# Patient Record
Sex: MALE | Race: WHITE | Employment: OTHER | URBAN - METROPOLITAN AREA
[De-identification: names, ages, dates, MRNs, and addresses within clinical notes are randomized per-mention and may not be internally consistent; named-entity substitution may affect disease eponyms.]

---

## 2017-10-17 ENCOUNTER — HOSPITAL ENCOUNTER (OUTPATIENT)
Dept: NON INVASIVE DIAGNOSTICS | Age: 32
Discharge: HOME | End: 2017-10-17
Attending: FAMILY MEDICINE | Admitting: FAMILY MEDICINE

## 2017-10-17 ENCOUNTER — OFFICE VISIT (OUTPATIENT)
Dept: FAMILY MEDICINE CLINIC | Age: 32
End: 2017-10-17

## 2017-10-17 VITALS
WEIGHT: 247.2 LBS | HEART RATE: 84 BPM | BODY MASS INDEX: 31.73 KG/M2 | SYSTOLIC BLOOD PRESSURE: 146 MMHG | HEIGHT: 74 IN | DIASTOLIC BLOOD PRESSURE: 94 MMHG | TEMPERATURE: 98.1 F

## 2017-10-17 DIAGNOSIS — R07.9 CHEST PAIN, UNSPECIFIED TYPE: Primary | ICD-10-CM

## 2017-10-17 DIAGNOSIS — R06.09 DOE (DYSPNEA ON EXERTION): ICD-10-CM

## 2017-10-17 DIAGNOSIS — R63.1 INCREASED THIRST: ICD-10-CM

## 2017-10-17 DIAGNOSIS — R10.11 RIGHT UPPER QUADRANT ABDOMINAL PAIN: ICD-10-CM

## 2017-10-17 DIAGNOSIS — R35.0 INCREASED FREQUENCY OF URINATION: ICD-10-CM

## 2017-10-17 DIAGNOSIS — R07.9 CHEST PAIN, UNSPECIFIED TYPE: ICD-10-CM

## 2017-10-17 PROCEDURE — 99203 OFFICE O/P NEW LOW 30 MIN: CPT | Performed by: FAMILY MEDICINE

## 2017-10-17 PROCEDURE — 93000 ELECTROCARDIOGRAM COMPLETE: CPT | Performed by: FAMILY MEDICINE

## 2017-10-17 RX ORDER — IBUPROFEN 200 MG
200 TABLET ORAL EVERY 6 HOURS PRN
COMMUNITY
End: 2017-10-25 | Stop reason: ALTCHOICE

## 2017-10-17 NOTE — PATIENT INSTRUCTIONS
a better chance of having a healthy baby. You will decrease the health risks of nonsmokers if you stop smoking. By stopping smoking you will also save money. What is the best way to stop smoking? A large percentage of people have tried to quit smoking at least once. Most people who try to quit smoking go through a series of stages. Following are the stages you may go through to stop smoking: Thinking about quitting. Deciding to quit on a certain day. Quitting smoking. Successfully staying an ex-smoker. You must be strong in order to quit smoking. When you decide to quit, you can get help from your caregiver or others. You will learn that there are many ways to stop smoking. Talk to your caregiver about the best method for you when you are ready to quit smoking. Ask your caregiver for more information about how to stop smoking. Call or write the following for more information about the risks of smoking. Smokefree. gov  Phone: 7-244.323.3328  Web Address: www.smokefree. gov  [employer]  [address] [place] [address],[address]. [address]  Phone: 3-8-678.397.4385  Phone: [phone]  Web Address: [email or web address]. [address] [place] [address]  Phone: 7-514.384.6593  Web Address: http://[employer]. gov   CARE AGREEMENT:   You have the right to help plan your care. To help with this plan, you must learn about your health condition and how it may be treated. You can then discuss treatment options with your caregivers. Work with them to decide what care may be used to treat you. You always have the right to refuse treatment. Copyright © 2009. [employer] All rights reserved. Information is for End User's use only and may not be sold, redistributed or otherwise used for commercial purposes. The above information is an  only. It is not intended as medical advice for individual conditions or treatments.  Talk to your doctor, nurse or pharmacist before following any medical regimen to see if it is safe and effective for you.

## 2017-10-17 NOTE — PROGRESS NOTES
exhibits no distension. There is tenderness (ruq ttp). There is no rebound and no guarding. Musculoskeletal: Normal range of motion. Lymphadenopathy:     He has no cervical adenopathy. Neurological: He is alert and oriented to person, place, and time. Skin: Skin is warm and dry. Psychiatric: He has a normal mood and affect.  His behavior is normal. Judgment and thought content normal.       Assessment:      CP- EKG NSR  WHELAN- cxr  RUQ pain- check gallbladder vs liver        Plan:      Orders Placed This Encounter   Procedures    XR CHEST STANDARD (2 VW)     Standing Status:   Future     Number of Occurrences:   1     Standing Expiration Date:   10/17/2018     Order Specific Question:   Reason for exam:     Answer:   chest pain    US ABDOMEN LIMITED     PLEASE IMAGE GALLBLADDER AND LIVER     Standing Status:   Future     Standing Expiration Date:   10/17/2018     Order Specific Question:   Reason for exam:     Answer:   ruq PAIN    TSH with Reflex     Standing Status:   Future     Standing Expiration Date:   10/17/2018    LIPID PANEL     Standing Status:   Future     Standing Expiration Date:   10/17/2018     Order Specific Question:   Is Patient Fasting?/# of Hours     Answer:   12    CBC WITH AUTO DIFFERENTIAL     Standing Status:   Future     Standing Expiration Date:   10/17/2018    COMPREHENSIVE METABOLIC PANEL     Standing Status:   Future     Standing Expiration Date:   10/17/2018    URINALYSIS     Standing Status:   Future     Standing Expiration Date:   10/17/2018    AMYLASE     Standing Status:   Future     Standing Expiration Date:   10/17/2018    LIPASE     Standing Status:   Future     Standing Expiration Date:   10/17/2018    EKG 12 Lead     Order Specific Question:   Reason for Exam?     Answer:   Chest pain     RTO 2 weeks

## 2017-10-18 ASSESSMENT — ENCOUNTER SYMPTOMS
ABDOMINAL PAIN: 1
EYE PAIN: 0
WHEEZING: 0
CONSTIPATION: 0
SINUS PRESSURE: 0
BLOOD IN STOOL: 0
COUGH: 0
RHINORRHEA: 0
SHORTNESS OF BREATH: 1
DIARRHEA: 0
VOMITING: 0
EYE DISCHARGE: 0
CHEST TIGHTNESS: 0

## 2017-10-19 DIAGNOSIS — R07.9 CHEST PAIN, UNSPECIFIED TYPE: ICD-10-CM

## 2017-10-19 DIAGNOSIS — R63.1 INCREASED THIRST: ICD-10-CM

## 2017-10-19 DIAGNOSIS — R73.9 HYPERGLYCEMIA: Primary | ICD-10-CM

## 2017-10-19 DIAGNOSIS — R35.0 INCREASED FREQUENCY OF URINATION: ICD-10-CM

## 2017-10-19 DIAGNOSIS — R73.9 HYPERGLYCEMIA: ICD-10-CM

## 2017-10-19 DIAGNOSIS — R10.11 RIGHT UPPER QUADRANT ABDOMINAL PAIN: ICD-10-CM

## 2017-10-19 LAB
A/G RATIO: 1.4 (ref 1.1–2.2)
ALBUMIN SERPL-MCNC: 4.3 G/DL (ref 3.4–5)
ALP BLD-CCNC: 76 U/L (ref 40–129)
ALT SERPL-CCNC: 63 U/L (ref 10–40)
AMYLASE: 41 U/L (ref 25–115)
ANION GAP SERPL CALCULATED.3IONS-SCNC: 14 MMOL/L (ref 3–16)
AST SERPL-CCNC: 32 U/L (ref 15–37)
BASOPHILS ABSOLUTE: 0.1 K/UL (ref 0–0.2)
BASOPHILS RELATIVE PERCENT: 0.5 %
BILIRUB SERPL-MCNC: 0.5 MG/DL (ref 0–1)
BILIRUBIN URINE: NEGATIVE
BLOOD, URINE: NEGATIVE
BUN BLDV-MCNC: 20 MG/DL (ref 7–20)
CALCIUM SERPL-MCNC: 9.8 MG/DL (ref 8.3–10.6)
CHLORIDE BLD-SCNC: 98 MMOL/L (ref 99–110)
CHOLESTEROL, TOTAL: 269 MG/DL (ref 0–199)
CLARITY: CLEAR
CO2: 27 MMOL/L (ref 21–32)
COLOR: YELLOW
CREAT SERPL-MCNC: 1 MG/DL (ref 0.9–1.3)
EOSINOPHILS ABSOLUTE: 0.2 K/UL (ref 0–0.6)
EOSINOPHILS RELATIVE PERCENT: 2.2 %
GFR AFRICAN AMERICAN: >60
GFR NON-AFRICAN AMERICAN: >60
GLOBULIN: 3 G/DL
GLUCOSE BLD-MCNC: 130 MG/DL (ref 70–99)
GLUCOSE URINE: NEGATIVE MG/DL
HCT VFR BLD CALC: 46.3 % (ref 40.5–52.5)
HDLC SERPL-MCNC: 54 MG/DL (ref 40–60)
HEMOGLOBIN: 16 G/DL (ref 13.5–17.5)
KETONES, URINE: NEGATIVE MG/DL
LDL CHOLESTEROL CALCULATED: 163 MG/DL
LEUKOCYTE ESTERASE, URINE: NEGATIVE
LIPASE: 19 U/L (ref 13–60)
LYMPHOCYTES ABSOLUTE: 2.5 K/UL (ref 1–5.1)
LYMPHOCYTES RELATIVE PERCENT: 22.8 %
MCH RBC QN AUTO: 31.9 PG (ref 26–34)
MCHC RBC AUTO-ENTMCNC: 34.5 G/DL (ref 31–36)
MCV RBC AUTO: 92.4 FL (ref 80–100)
MICROSCOPIC EXAMINATION: NORMAL
MONOCYTES ABSOLUTE: 0.9 K/UL (ref 0–1.3)
MONOCYTES RELATIVE PERCENT: 8.4 %
NEUTROPHILS ABSOLUTE: 7.2 K/UL (ref 1.7–7.7)
NEUTROPHILS RELATIVE PERCENT: 66.1 %
NITRITE, URINE: NEGATIVE
PDW BLD-RTO: 12.4 % (ref 12.4–15.4)
PH UA: 6
PLATELET # BLD: 253 K/UL (ref 135–450)
PMV BLD AUTO: 8.9 FL (ref 5–10.5)
POTASSIUM SERPL-SCNC: 4.5 MMOL/L (ref 3.5–5.1)
PROTEIN UA: NEGATIVE MG/DL
RBC # BLD: 5.01 M/UL (ref 4.2–5.9)
SODIUM BLD-SCNC: 139 MMOL/L (ref 136–145)
SPECIFIC GRAVITY UA: 1.02
TOTAL PROTEIN: 7.3 G/DL (ref 6.4–8.2)
TRIGL SERPL-MCNC: 259 MG/DL (ref 0–150)
TSH REFLEX: 3.65 UIU/ML (ref 0.27–4.2)
URINE TYPE: NORMAL
UROBILINOGEN, URINE: 0.2 E.U./DL
VLDLC SERPL CALC-MCNC: 52 MG/DL
WBC # BLD: 10.9 K/UL (ref 4–11)

## 2017-10-20 LAB
ESTIMATED AVERAGE GLUCOSE: 111.2 MG/DL
HBA1C MFR BLD: 5.5 %

## 2017-10-24 ENCOUNTER — HOSPITAL ENCOUNTER (OUTPATIENT)
Dept: ULTRASOUND IMAGING | Age: 32
Discharge: HOME | End: 2017-10-24
Attending: FAMILY MEDICINE | Admitting: FAMILY MEDICINE

## 2017-10-24 DIAGNOSIS — R10.11 RIGHT UPPER QUADRANT PAIN: ICD-10-CM

## 2017-10-24 DIAGNOSIS — R10.11 RIGHT UPPER QUADRANT ABDOMINAL PAIN: ICD-10-CM

## 2017-10-25 ENCOUNTER — OFFICE VISIT (OUTPATIENT)
Dept: FAMILY MEDICINE CLINIC | Age: 32
End: 2017-10-25

## 2017-10-25 VITALS
TEMPERATURE: 98.6 F | HEIGHT: 74 IN | BODY MASS INDEX: 31.16 KG/M2 | WEIGHT: 242.8 LBS | DIASTOLIC BLOOD PRESSURE: 90 MMHG | HEART RATE: 88 BPM | SYSTOLIC BLOOD PRESSURE: 144 MMHG

## 2017-10-25 DIAGNOSIS — E78.2 MIXED HYPERLIPIDEMIA: ICD-10-CM

## 2017-10-25 DIAGNOSIS — K76.0 FATTY LIVER: ICD-10-CM

## 2017-10-25 DIAGNOSIS — R10.11 RIGHT UPPER QUADRANT ABDOMINAL PAIN: Primary | ICD-10-CM

## 2017-10-25 PROCEDURE — 99213 OFFICE O/P EST LOW 20 MIN: CPT | Performed by: FAMILY MEDICINE

## 2017-10-25 RX ORDER — OMEPRAZOLE 20 MG/1
20 CAPSULE, DELAYED RELEASE ORAL DAILY
Qty: 30 CAPSULE | Refills: 3 | Status: SHIPPED | OUTPATIENT
Start: 2017-10-25 | End: 2018-07-12

## 2017-10-25 NOTE — PATIENT INSTRUCTIONS
Hardening of the arteries happens more often in smokers than in nonsmokers. This may make it more likely for you to have a stroke (blood clot in your brain). The more cigarettes you smoke, the greater your risk of a heart attack. Lung disease: The younger you are when you start smoking, the greater your risk of getting lung diseases. Many smokers have a cough which is caused by the chemicals in smoke. These chemicals harm the cilia (tiny hairs) that line the lungs and help remove dirt and waste products. Depending upon how much you smoke, your lungs become gray and \"dirty\" (they look like charcoal). Healthy lungs are pink. Chronic bronchitis is a serious lung infection which is often caused by smoking. Emphysema is a long-term lung disease that may be caused by smoking cigarettes. Cigarette smoking also makes asthma worse. You are at a higher risk of getting colds, pneumonia, and other lung infections if you smoke. Gastrointestinal disease: Cigarette smoking increases the amount of acid that is made by your stomach, and may cause a peptic ulcer. A peptic ulcer is an open sore in the stomach or duodenum (part of the intestine). You may also get gastroesophageal reflux from smoking. This is when you have a backflow of stomach acid into your esophagus (food tube). Other problems: The following are other problems that smoking may cause: Bad breath. Bad smell in your clothes, hair, and skin. Decreased ability to play sports or do physical activities because of breathing problems. Earlier than normal wrinkling of the skin, usually the face. Higher risk of bone fractures, such as hip, wrist, or spine. Higher risk of starting a fire. This may happen if you fall asleep with a lit cigarette. Men may have problems having an erection. Sleeping problems. Smoking is an expensive (costly) habit. You will save money if you choose to stop smoking. Sore throat. Staining of teeth. Women and smoking: You may have a higher risk of having a heart attack or stroke if you smoke and use birth control pills. This risk is more serious if you are 35 years or older. The risk of losing your unborn baby or having a stillborn baby is higher if you are pregnant and smoke. Babies born to smoking mothers often weigh less, and are at a higher risk of sudden infant death syndrome (SIDS). You may have a harder time getting pregnant if you are a smoker. Women who smoke may have a higher risk of osteoporosis (also known as \"brittle bones\"). Women who smoke also have a higher risk of incontinence, which is when you are unable to control when you urinate. Are there risks with smoking cigars or pipes? The risks are the same for people who smoke cigars or pipes as they are for cigarette smokers. There is a risk of getting cancer of the mouth, lip, larynx (voice box), or esophagus if you smoke a cigar or pipe. What are the risks of using snuff or chewing tobacco (\"smokeless tobacco\")? People who use snuff or chewing tobacco have an increased risk of getting mouth or throat cancer. The risk of heart disease, stroke, blood vessel disease and stomach problems is the same as it is for cigarette smokers. What is \"passive smoking\"? Tobacco smoke is dangerous to others. The effect that smoking has on nonsmokers is called \"passive smoking\". Nonsmokers who breathe tobacco smoke have the same health risks as smokers. Children who are around tobacco smoke may have more colds, ear infections, or other breathing problems. Why should I quit smoking? The benefits from quitting smoking happen right away. Your sense of taste and smell will improve. Your body, clothes, car, and home will not smell of tobacco smoke. Your chance of getting cancer will be reduced as compared to a person who does not quit. As a former smoker, you will live longer than people who continue to smoke.  Women who quit smoking before getting pregnant have a better chance of having a healthy baby. You will decrease the health risks of nonsmokers if you stop smoking. By stopping smoking you will also save money. What is the best way to stop smoking? A large percentage of people have tried to quit smoking at least once. Most people who try to quit smoking go through a series of stages. Following are the stages you may go through to stop smoking: Thinking about quitting. Deciding to quit on a certain day. Quitting smoking. Successfully staying an ex-smoker. You must be strong in order to quit smoking. When you decide to quit, you can get help from your caregiver or others. You will learn that there are many ways to stop smoking. Talk to your caregiver about the best method for you when you are ready to quit smoking. Ask your caregiver for more information about how to stop smoking. Call or write the following for more information about the risks of smoking. Smokefree. gov  Phone: 1-648.171.7885  Web Address: www.smokefree. gov  American [employer] Association  [address] [place] [address],[address]. [address]  Phone: 8-1-657.175.3357  Phone: 6-5-638--[phone]  Web Address: [email or web address]. [address] [place] [address]  Phone: 0-815.377.5907  Web Address: http://[employer]/. gov   CARE AGREEMENT:   You have the right to help plan your care. To help with this plan, you must learn about your health condition and how it may be treated. You can then discuss treatment options with your caregivers. Work with them to decide what care may be used to treat you. You always have the right to refuse treatment. Copyright © 2009. [employer] All rights reserved. Information is for End User's use only and may not be sold, redistributed or otherwise used for commercial purposes. The above information is an  only. It is not intended as medical advice for individual conditions or treatments.  Talk to your doctor, nurse or pharmacist before following any medical regimen to see if it is safe and effective for you.

## 2017-10-29 PROBLEM — E78.2 MIXED HYPERLIPIDEMIA: Status: ACTIVE | Noted: 2017-10-29

## 2017-10-29 PROBLEM — R10.11 RIGHT UPPER QUADRANT ABDOMINAL PAIN: Status: ACTIVE | Noted: 2017-10-29

## 2017-10-29 PROBLEM — K76.0 FATTY LIVER: Status: ACTIVE | Noted: 2017-10-29

## 2017-10-29 ASSESSMENT — ENCOUNTER SYMPTOMS
EYE PAIN: 0
COUGH: 0
RHINORRHEA: 0
CHEST TIGHTNESS: 0
DIARRHEA: 0
EYE DISCHARGE: 0
ABDOMINAL PAIN: 1
SHORTNESS OF BREATH: 0
SINUS PRESSURE: 0
VOMITING: 0
BLOOD IN STOOL: 0
WHEEZING: 0
CONSTIPATION: 0

## 2017-10-29 NOTE — PROGRESS NOTES
Subjective:      Patient ID: Denis Carlin is a 27 y.o. male. HPI  Chief Complaint   Patient presents with    Discuss Labs     review labs & US     Here for f/u on labs. Chest pain resolved but still with abd pain. States all the time. Not food related  Vitals:    10/25/17 1130   BP: (!) 144/90   Pulse: 88   Temp: 98.6 °F (37 °C)   TempSrc: Oral   Weight: 242 lb 12.8 oz (110.1 kg)   Height: 6' 2\" (1.88 m)     Body mass index is 31.17 kg/m².      Wt Readings from Last 3 Encounters:   10/25/17 242 lb 12.8 oz (110.1 kg)   10/17/17 247 lb 3.2 oz (112.1 kg)     BP Readings from Last 3 Encounters:   10/25/17 (!) 144/90   10/17/17 (!) 146/94      Lab Review   Orders Only on 10/19/2017   Component Date Value    Hemoglobin A1C 10/20/2017 5.5     eAG 10/20/2017 111.2    Orders Only on 10/19/2017   Component Date Value    TSH 10/19/2017 3.65     Cholesterol, Total 10/19/2017 269*    Triglycerides 10/19/2017 259*    HDL 10/19/2017 54     LDL Calculated 10/19/2017 163*    VLDL CHOLESTEROL CALCULA* 10/19/2017 52     WBC 10/19/2017 10.9     RBC 10/19/2017 5.01     Hemoglobin 10/19/2017 16.0     Hematocrit 10/19/2017 46.3     MCV 10/19/2017 92.4     MCH 10/19/2017 31.9     MCHC 10/19/2017 34.5     RDW 10/19/2017 12.4     Platelets 53/12/3570 253     MPV 10/19/2017 8.9     Neutrophils % 10/19/2017 66.1     Lymphocytes % 10/19/2017 22.8     Monocytes % 10/19/2017 8.4     Eosinophils % 10/19/2017 2.2     Basophils % 10/19/2017 0.5     Neutrophils # 10/19/2017 7.2     Lymphocytes # 10/19/2017 2.5     Monocytes # 10/19/2017 0.9     Eosinophils # 10/19/2017 0.2     Basophils # 10/19/2017 0.1     Sodium 10/19/2017 139     Potassium 10/19/2017 4.5     Chloride 10/19/2017 98*    CO2 10/19/2017 27     Anion Gap 10/19/2017 14     Glucose 10/19/2017 130*    BUN 10/19/2017 20     CREATININE 10/19/2017 1.0     GFR Non- 10/19/2017 >60     GFR  10/19/2017 >60     Calcium 10/19/2017 9.8     Total Protein 10/19/2017 7.3     Alb 10/19/2017 4.3     Albumin/Globulin Ratio 10/19/2017 1.4     Total Bilirubin 10/19/2017 0.5     Alkaline Phosphatase 10/19/2017 76     ALT 10/19/2017 63*    AST 10/19/2017 32     Globulin 10/19/2017 3.0     Color, UA 10/19/2017 YELLOW     Clarity, UA 10/19/2017 Clear     Glucose, Ur 10/19/2017 Negative     Bilirubin Urine 10/19/2017 Negative     Ketones, Urine 10/19/2017 Negative     Specific Gravity, UA 10/19/2017 1.017     Blood, Urine 10/19/2017 Negative     pH, UA 10/19/2017 6.0     Protein, UA 10/19/2017 Negative     Urobilinogen, Urine 10/19/2017 0.2     Nitrite, Urine 10/19/2017 Negative     Leukocyte Esterase, Urine 10/19/2017 Negative     Microscopic Examination 10/19/2017 Not Indicated     Urine Type 10/19/2017 Clean catch     Amylase 10/19/2017 41     Lipase 10/19/2017 19.0        Review of Systems   Constitutional: Negative for fatigue, fever and unexpected weight change. HENT: Negative for congestion, ear discharge, ear pain, hearing loss, rhinorrhea and sinus pressure. Eyes: Negative for pain, discharge and visual disturbance. Respiratory: Negative for cough, chest tightness, shortness of breath and wheezing. Cardiovascular: Negative for chest pain, palpitations and leg swelling. Gastrointestinal: Positive for abdominal pain. Negative for blood in stool, constipation, diarrhea and vomiting. Genitourinary: Negative for difficulty urinating, dysuria and hematuria. Neurological: Negative for dizziness, seizures, syncope, numbness and headaches. Psychiatric/Behavioral: Negative for dysphoric mood and sleep disturbance. The patient is not nervous/anxious. All other systems reviewed and are negative. Objective:   Physical Exam   Constitutional: He is oriented to person, place, and time. He appears well-developed and well-nourished. No distress. HENT:   Head: Normocephalic.    Mouth/Throat: Oropharynx is clear and moist. No oropharyngeal exudate. Eyes: EOM are normal.   Neck: Neck supple. Cardiovascular: Normal rate, regular rhythm, normal heart sounds and intact distal pulses. No murmur heard. Pulmonary/Chest: Effort normal and breath sounds normal. He has no wheezes. Abdominal: Soft. Bowel sounds are normal. He exhibits no distension. There is no tenderness. There is no rebound and no guarding. Neurological: He is oriented to person, place, and time. Skin: Skin is warm.    Psychiatric: His behavior is normal.       Assessment:      abd pain- start prilosec, refer GI  Fatty liver  hld- recheck 6 months        Plan:      Reviewed labs w/ pt  Diet and exercise  Stop smoking  Orders Placed This Encounter   Procedures    LIPID PANEL     Standing Status:   Future     Standing Expiration Date:   10/25/2018     Order Specific Question:   Is Patient Fasting?/# of Hours     Answer:   12    COMPREHENSIVE METABOLIC PANEL     Standing Status:   Future     Standing Expiration Date:   2/25/2018     Orders Placed This Encounter   Medications    omeprazole (PRILOSEC) 20 MG delayed release capsule     Sig: Take 1 capsule by mouth Daily     Dispense:  30 capsule     Refill:  3

## 2017-11-20 ENCOUNTER — HOSPITAL ENCOUNTER (OUTPATIENT)
Dept: NUCLEAR MEDICINE | Age: 32
Discharge: HOME | End: 2017-11-20
Admitting: INTERNAL MEDICINE

## 2017-11-20 DIAGNOSIS — K30 DELAYED GASTRIC EMPTYING: ICD-10-CM

## 2017-11-20 RX ADMIN — Medication 500 MICRO CURIE: at 09:10

## 2018-07-11 ENCOUNTER — OFFICE VISIT (OUTPATIENT)
Dept: ORTHOPEDIC SURGERY | Age: 33
End: 2018-07-11

## 2018-07-11 ENCOUNTER — OFFICE VISIT (OUTPATIENT)
Dept: FAMILY MEDICINE CLINIC | Age: 33
End: 2018-07-11

## 2018-07-11 VITALS
HEIGHT: 74 IN | DIASTOLIC BLOOD PRESSURE: 75 MMHG | TEMPERATURE: 98.4 F | HEART RATE: 41 BPM | BODY MASS INDEX: 31.57 KG/M2 | SYSTOLIC BLOOD PRESSURE: 142 MMHG | WEIGHT: 246 LBS

## 2018-07-11 VITALS
TEMPERATURE: 98 F | BODY MASS INDEX: 31.57 KG/M2 | SYSTOLIC BLOOD PRESSURE: 132 MMHG | DIASTOLIC BLOOD PRESSURE: 88 MMHG | HEIGHT: 74 IN | WEIGHT: 246 LBS

## 2018-07-11 DIAGNOSIS — M70.42 PREPATELLAR BURSITIS OF LEFT KNEE: Primary | ICD-10-CM

## 2018-07-11 DIAGNOSIS — M25.562 ACUTE PAIN OF LEFT KNEE: Primary | ICD-10-CM

## 2018-07-11 DIAGNOSIS — M71.162 SEPTIC PREPATELLAR BURSITIS OF LEFT KNEE: ICD-10-CM

## 2018-07-11 PROCEDURE — 99203 OFFICE O/P NEW LOW 30 MIN: CPT | Performed by: PHYSICIAN ASSISTANT

## 2018-07-11 PROCEDURE — 99212 OFFICE O/P EST SF 10 MIN: CPT | Performed by: FAMILY MEDICINE

## 2018-07-11 RX ORDER — SULFAMETHOXAZOLE AND TRIMETHOPRIM 800; 160 MG/1; MG/1
1 TABLET ORAL 2 TIMES DAILY
COMMUNITY
Start: 2018-07-07 | End: 2018-10-26 | Stop reason: ALTCHOICE

## 2018-07-11 RX ORDER — CEPHALEXIN 500 MG/1
500 CAPSULE ORAL 4 TIMES DAILY
COMMUNITY
Start: 2018-07-07 | End: 2018-07-18

## 2018-07-11 NOTE — PROGRESS NOTES
 omeprazole (PRILOSEC) 20 MG delayed release capsule Take 1 capsule by mouth Daily 30 capsule 3     No current facility-administered medications for this visit. Objective:     He is alert, oriented x 3, pleasant, well nourished, developed and in no   acute distress. BP (!) 142/75   Pulse (!) 41   Temp 98.4 °F (36.9 °C) (Temporal)   Ht 6' 2\" (1.88 m)   Wt 246 lb (111.6 kg)   BMI 31.58 kg/m²        Examination of the left knee shows: The alignment of the knee is neutral.   There is mild erythema around the prepatellar region only. There mild soft tissue swelling. There is moderate effusion within the prepatellar bursa  There is no joint effusion. ROM-  Extension 0          -   Flexion  130   There no pain associated with ROM testing. Medial joint line is not tender to palpation. Lateral joint line is not tender to palpation. Retro patellar crepitus is not present. There is is not crepitus along the joint line with ROM testing. Varus Stress testing does not produce pain,                                     does not show laxity. Valgus Stress testing does not produce pain,                                       does not show laxity. Lachman's test- negative. Anterior Drawer test- negative. Posterior Drawer test- negative. Tala's Test- negative. Patellar Compression testing does not produce pain. Extensor Mechanism is  intact. Examination of the lower extremities are intact with sensation to light touch. Motor testing  5/5 in all major motor groups of the lower extremities. Gait is normal heel to toe. Gait is not antalgic. Negative Horne's Sign. SLR negative. Examination of the lower extremities shows intact perfusion to all extremities. No cyanosis. Digits are warm to touch, capillary refill is less than 2 seconds. mild edema noted LLE. Examination of the skin over both lower extremities reveals:   The skin to be intact without

## 2018-07-11 NOTE — PROGRESS NOTES
Subjective:      Patient ID: Damon Bar is a 27 y.o. male. Patient presents with:  Knee Pain: left knee pain and swelling over the weekend - saw Urgent Care in [facility]     He had tender swelling for a week or less   Red and did have a small pimple he picked at   Seen at urgent care and treated for cellulitis and also bursitis  He is treated with meds    He lays floors for living    He is still having some pain though it is better. No fever  He did have swelling last night and was working on the knees yesterday  Swelling better today    YOB: 1985    Date of Visit:  7/11/2018    No Known Allergies    Current Outpatient Prescriptions:  cephALEXin (KEFLEX) 500 MG capsule, Take 500 mg by mouth, Disp: , Rfl:   sulfamethoxazole-trimethoprim (BACTRIM DS;SEPTRA DS) 800-160 MG per tablet, Take 1 tablet by mouth, Disp: , Rfl:   omeprazole (PRILOSEC) 20 MG delayed release capsule, Take 1 capsule by mouth Daily, Disp: 30 capsule, Rfl: 3    No current facility-administered medications for this visit.       ---------------------------               07/11/18                      1128         ---------------------------   BP:          132/88         Site:       Left Arm        Position:     Sitting        Cuff Size:   Large Adult      Temp:    98 °F (36.7 °C)    TempSrc:      Oral          Weight: 246 lb (111.6 kg)   Height:  6' 2\" (1.88 m)    ---------------------------  Body mass index is 31.58 kg/m². Wt Readings from Last 3 Encounters:  07/11/18 : 246 lb (111.6 kg)  10/25/17 : 242 lb 12.8 oz (110.1 kg)  10/17/17 : 247 lb 3.2 oz (112.1 kg)    BP Readings from Last 3 Encounters:  07/11/18 : 132/88  10/25/17 : (!) 144/90  10/17/17 : (!) 146/94          Review of Systems    Objective:   Physical Exam   Constitutional: He appears well-developed and well-nourished. No distress. Musculoskeletal:   Knee on left prepatellar bursa no pain   Neurological: He is alert.    Skin:

## 2018-07-12 ENCOUNTER — PAT TELEPHONE (OUTPATIENT)
Dept: PREADMISSION TESTING | Age: 33
End: 2018-07-12

## 2018-07-12 VITALS — WEIGHT: 246 LBS | BODY MASS INDEX: 31.57 KG/M2 | HEIGHT: 74 IN

## 2018-07-12 ASSESSMENT — PAIN DESCRIPTION - PAIN TYPE: TYPE: ACUTE PAIN

## 2018-07-12 ASSESSMENT — PAIN - FUNCTIONAL ASSESSMENT: PAIN_FUNCTIONAL_ASSESSMENT: 0-10

## 2018-07-12 ASSESSMENT — PAIN DESCRIPTION - ORIENTATION: ORIENTATION: LEFT

## 2018-07-12 ASSESSMENT — PAIN DESCRIPTION - FREQUENCY: FREQUENCY: CONTINUOUS

## 2018-07-12 ASSESSMENT — PAIN DESCRIPTION - DESCRIPTORS: DESCRIPTORS: PRESSURE

## 2018-07-12 ASSESSMENT — PAIN SCALES - GENERAL: PAINLEVEL_OUTOF10: 5

## 2018-07-12 ASSESSMENT — PAIN DESCRIPTION - LOCATION: LOCATION: KNEE

## 2018-07-12 NOTE — PRE-PROCEDURE INSTRUCTIONS
[phone]  Please note these are generalized instructions for all surgical cases, you may be provided with more specific instructions according to your surgery.

## 2018-07-13 ENCOUNTER — HOSPITAL ENCOUNTER (OUTPATIENT)
Dept: SURGERY | Age: 33
Discharge: HOME | End: 2018-07-13
Attending: ORTHOPAEDIC SURGERY | Admitting: ORTHOPAEDIC SURGERY

## 2018-07-13 VITALS
BODY MASS INDEX: 31.73 KG/M2 | RESPIRATION RATE: 18 BRPM | TEMPERATURE: 97.6 F | HEART RATE: 72 BPM | WEIGHT: 247.14 LBS | OXYGEN SATURATION: 99 % | SYSTOLIC BLOOD PRESSURE: 140 MMHG | DIASTOLIC BLOOD PRESSURE: 79 MMHG

## 2018-07-13 PROCEDURE — 27301 DRAIN THIGH/KNEE LESION: CPT | Performed by: ORTHOPAEDIC SURGERY

## 2018-07-13 RX ORDER — OXYCODONE HYDROCHLORIDE AND ACETAMINOPHEN 5; 325 MG/1; MG/1
1 TABLET ORAL PRN
Status: COMPLETED | OUTPATIENT
Start: 2018-07-13 | End: 2018-07-13

## 2018-07-13 RX ORDER — ONDANSETRON 2 MG/ML
4 INJECTION INTRAMUSCULAR; INTRAVENOUS
Status: ACTIVE | OUTPATIENT
Start: 2018-07-13 | End: 2018-07-13

## 2018-07-13 RX ORDER — SODIUM CHLORIDE 0.9 % (FLUSH) 0.9 %
10 SYRINGE (ML) INJECTION EVERY 12 HOURS SCHEDULED
Status: DISCONTINUED | OUTPATIENT
Start: 2018-07-13 | End: 2018-07-14 | Stop reason: HOSPADM

## 2018-07-13 RX ORDER — FENTANYL CITRATE 50 UG/ML
50 INJECTION, SOLUTION INTRAMUSCULAR; INTRAVENOUS EVERY 5 MIN PRN
Status: DISCONTINUED | OUTPATIENT
Start: 2018-07-13 | End: 2018-07-14 | Stop reason: HOSPADM

## 2018-07-13 RX ORDER — SODIUM CHLORIDE 9 MG/ML
INJECTION, SOLUTION INTRAVENOUS CONTINUOUS
Status: DISCONTINUED | OUTPATIENT
Start: 2018-07-13 | End: 2018-07-14 | Stop reason: HOSPADM

## 2018-07-13 RX ORDER — MORPHINE SULFATE 2 MG/ML
1 INJECTION, SOLUTION INTRAMUSCULAR; INTRAVENOUS EVERY 5 MIN PRN
Status: DISCONTINUED | OUTPATIENT
Start: 2018-07-13 | End: 2018-07-14 | Stop reason: HOSPADM

## 2018-07-13 RX ORDER — MEPERIDINE HYDROCHLORIDE 25 MG/ML
12.5 INJECTION INTRAMUSCULAR; INTRAVENOUS; SUBCUTANEOUS EVERY 5 MIN PRN
Status: DISCONTINUED | OUTPATIENT
Start: 2018-07-13 | End: 2018-07-14 | Stop reason: HOSPADM

## 2018-07-13 RX ORDER — MORPHINE SULFATE 2 MG/ML
2 INJECTION, SOLUTION INTRAMUSCULAR; INTRAVENOUS EVERY 5 MIN PRN
Status: DISCONTINUED | OUTPATIENT
Start: 2018-07-13 | End: 2018-07-14 | Stop reason: HOSPADM

## 2018-07-13 RX ORDER — OXYCODONE HYDROCHLORIDE AND ACETAMINOPHEN 5; 325 MG/1; MG/1
2 TABLET ORAL PRN
Status: COMPLETED | OUTPATIENT
Start: 2018-07-13 | End: 2018-07-13

## 2018-07-13 RX ORDER — FENTANYL CITRATE 50 UG/ML
25 INJECTION, SOLUTION INTRAMUSCULAR; INTRAVENOUS EVERY 5 MIN PRN
Status: DISCONTINUED | OUTPATIENT
Start: 2018-07-13 | End: 2018-07-14 | Stop reason: HOSPADM

## 2018-07-13 RX ORDER — CEPHALEXIN 500 MG/1
500 CAPSULE ORAL 4 TIMES DAILY
Qty: 40 CAPSULE | Refills: 0 | Status: SHIPPED | OUTPATIENT
Start: 2018-07-13 | End: 2018-07-23

## 2018-07-13 RX ORDER — SODIUM CHLORIDE 0.9 % (FLUSH) 0.9 %
10 SYRINGE (ML) INJECTION PRN
Status: DISCONTINUED | OUTPATIENT
Start: 2018-07-13 | End: 2018-07-14 | Stop reason: HOSPADM

## 2018-07-13 RX ADMIN — SODIUM CHLORIDE: 9 INJECTION, SOLUTION INTRAVENOUS at 08:10

## 2018-07-13 RX ADMIN — OXYCODONE HYDROCHLORIDE AND ACETAMINOPHEN 1 TABLET: 5; 325 TABLET ORAL at 10:54

## 2018-07-13 ASSESSMENT — PAIN SCALES - GENERAL
PAINLEVEL_OUTOF10: 6
PAINLEVEL_OUTOF10: 3
PAINLEVEL_OUTOF10: 5

## 2018-07-13 ASSESSMENT — PAIN - FUNCTIONAL ASSESSMENT: PAIN_FUNCTIONAL_ASSESSMENT: 0-10

## 2018-07-13 ASSESSMENT — PAIN DESCRIPTION - FREQUENCY: FREQUENCY: INTERMITTENT

## 2018-07-13 ASSESSMENT — PAIN DESCRIPTION - PAIN TYPE
TYPE: ACUTE PAIN;SURGICAL PAIN
TYPE: ACUTE PAIN;SURGICAL PAIN

## 2018-07-13 ASSESSMENT — PAIN DESCRIPTION - LOCATION
LOCATION: KNEE
LOCATION: KNEE

## 2018-07-13 ASSESSMENT — LIFESTYLE VARIABLES: SMOKING_STATUS: 0

## 2018-07-13 ASSESSMENT — PAIN DESCRIPTION - ORIENTATION
ORIENTATION: LEFT
ORIENTATION: LEFT

## 2018-07-13 ASSESSMENT — PAIN DESCRIPTION - DESCRIPTORS
DESCRIPTORS: DISCOMFORT;BURNING
DESCRIPTORS: DISCOMFORT;DULL

## 2018-07-13 ASSESSMENT — PAIN DESCRIPTION - PROGRESSION: CLINICAL_PROGRESSION: GRADUALLY IMPROVING

## 2018-07-13 NOTE — H&P
Preoperative H&P Update    The patient's History and Physical in the medical record from 7/11/2018 was reviewed by me today. I reviewed the HPI, medications, allergies, reason for surgery, diagnosis and treatment plan and there has been no change. The patient was evaluated by me today. Physical exam findings for this update include:    Vitals:    07/13/18 0803   BP: (!) 147/74   Pulse: 60   Resp: 18   Temp: 97 °F (36.1 °C)   SpO2: 98%     Airway is intact  Chest: chest clear, no wheezing, rales, normal symmetric air entry, no tachypnea, retractions or cyanosis  Heart: regular rate and rhythm ; heart sounds normal  Findings on exam of the body region where surgery is to be performed include:  Left knee prepatellar bursitis.      Electronically signed by Ethan Jovel MD on 7/13/2018 at 9:23 AM

## 2018-07-13 NOTE — ANESTHESIA PRE-OP
Department of Anesthesiology  Preprocedure Note       Name:  April Garcia   Age:  27 y.o.  :  1985                                          MRN:  [id number]         Date:  2018      [facility] Department of Anesthesiology  Pre-Anesthesia Evaluation/Consultation       Name:  April Garcia                                         Age:  27 y.o. MRN:  [id number]           Procedure (Scheduled):  L knee I/D  Surgeon:  Dr. Raymundo Segura   Allergen Reactions    Nickel Rash     Pt develops rash when nickel buckle comes in contact with skin. Patient Active Problem List   Diagnosis    Mixed hyperlipidemia    Right upper quadrant abdominal pain    Fatty liver    Septic prepatellar bursitis of left knee     Past Medical History:   Diagnosis Date    Rapid heart beat     at times exam -     Past Surgical History:   Procedure Laterality Date    ENDOSCOPY, COLON, DIAGNOSTIC      MOUTH SURGERY       Social History   Substance Use Topics    Smoking status: Never Smoker    Smokeless tobacco: Current User     Types: Snuff      Comment: dip    Alcohol use 4.8 - 7.2 oz/week     8 - 12 Cans of beer per week      Comment: 8-12 beers daily     Medications  Current Outpatient Prescriptions on File Prior to Encounter   Medication Sig Dispense Refill    cephALEXin (KEFLEX) 500 MG capsule Take 500 mg by mouth 4 times daily       sulfamethoxazole-trimethoprim (BACTRIM DS;SEPTRA DS) 800-160 MG per tablet Take 1 tablet by mouth 2 times daily        No current facility-administered medications on file prior to encounter.       Current Outpatient Prescriptions   Medication Sig Dispense Refill    NONFORMULARY 2 times daily quadracarn vitamin supplement Stopped for surgery last dose on 18      cephALEXin (KEFLEX) 500 MG capsule Take 500 mg by mouth 4 times daily       sulfamethoxazole-trimethoprim (BACTRIM DS;SEPTRA DS) 800-160 MG per tablet Take 1 tablet by mouth 2 times daily        Current 10/19/2017    LABGLOM >60 10/19/2017    GLUCOSE 130 10/19/2017    PROT 7.3 10/19/2017    CALCIUM 9.8 10/19/2017    BILITOT 0.5 10/19/2017    ALKPHOS 76 10/19/2017    AST 32 10/19/2017    ALT 63 10/19/2017     BMP    Lab Results   Component Value Date     10/19/2017    K 4.5 10/19/2017    CL 98 10/19/2017    CO2 27 10/19/2017    BUN 20 10/19/2017    CREATININE 1.0 10/19/2017    CALCIUM 9.8 10/19/2017    GFRAA >60 10/19/2017    LABGLOM >60 10/19/2017    GLUCOSE 130 10/19/2017     POCGlucose  No results for input(s): GLUCOSE in the last 72 hours. Coags  No results found for: PROTIME, INR, APTT  HCG (If Applicable) No results found for: PREGTESTUR, PREGSERUM, HCG, HCGQUANT   ABGs No results found for: PHART, PO2ART, UIB1QNX, HIO9YBG, BEART, G1XQGBDN   Type & Screen (If Applicable)  No results found for: LABABO, [address]    Surgeon:    Procedure:    Medications prior to admission:   Prior to Admission medications    Medication Sig Start Date End Date Taking?  Authorizing Provider   NONFORMULARY 2 times daily quadracarn vitamin supplement Stopped for surgery last dose on 7/12/18   Yes Historical Provider, MD   cephALEXin (KEFLEX) 500 MG capsule Take 500 mg by mouth 4 times daily  7/7/18 7/18/18 Yes Historical Provider, MD   sulfamethoxazole-trimethoprim (BACTRIM DS;SEPTRA DS) 800-160 MG per tablet Take 1 tablet by mouth 2 times daily  7/7/18  Yes Historical Provider, MD       Current medications:    Current Outpatient Prescriptions   Medication Sig Dispense Refill    NONFORMULARY 2 times daily quadracarn vitamin supplement Stopped for surgery last dose on 7/12/18      cephALEXin (KEFLEX) 500 MG capsule Take 500 mg by mouth 4 times daily       sulfamethoxazole-trimethoprim (BACTRIM DS;SEPTRA DS) 800-160 MG per tablet Take 1 tablet by mouth 2 times daily        Current Facility-Administered Medications   Medication Dose Route Frequency Provider Last Rate Last Dose    0.9 % sodium chloride infusion   Intravenous

## 2018-07-13 NOTE — PROGRESS NOTES
Pt arrived to PACU from OR. Pt sleeping on 3l/nc with oral airway in place. Left leg with ace wrap.  +pulses. Cap refill WNL.

## 2018-07-13 NOTE — ANESTHESIA POST-OP
[address] [facility] Anesthesiology  Post-Anesthesia Note       Name:  Mikala Mendosar                                         Age:  27 y.o. MRN:  [id number]     Last Vitals & Oxygen Saturation: BP (!) 140/79   Pulse 72   Temp 97.6 °F (36.4 °C) (Temporal)   Resp 18   Wt 247 lb 2.2 oz (112.1 kg)   SpO2 99%   BMI 31.73 kg/m²   Patient Vitals for the past 4 hrs:   BP Temp Temp src Pulse Resp SpO2   07/13/18 1115 (!) 140/79 - - 72 18 99 %   07/13/18 1038 (!) 158/94 97.6 °F (36.4 °C) Temporal 70 18 94 %   07/13/18 1032 (!) 148/108 - - 77 16 96 %   07/13/18 1027 (!) 145/110 - - 74 13 100 %   07/13/18 1022 (!) 138/111 - - 70 16 100 %   07/13/18 1020 (!) 143/97 - - 66 12 100 %   07/13/18 1009 120/89 98.2 °F (36.8 °C) Temporal 74 11 97 %       Level of consciousness:  Awake, alert    Respiratory: Respirations easy, no distress. Stable. Cardiovascular: Hemodynamically stable. Hydration: Adequate. PONV: Adequately managed. Post-op pain: Adequately controlled. Post-op assessment: Tolerated anesthetic well without complication. Complications:  None.     Brodie Dunn MD  July 13, 2018   1:00 PM

## 2018-07-15 NOTE — OP NOTE
[address] [place] [address] [facility]                                 OPERATIVE REPORT    PATIENT NAME: Jose Austin                        :        1985  MED REC NO:   [id number]                          ROOM:  ACCOUNT NO:   [de-identified]                          ADMIT DATE: 2018  PROVIDER:     Allie Hurtado MD      DATE OF PROCEDURE:  2018    PRIMARY CARE PHYSICIAN:  Bertram Gutierres MD    PREOPERATIVE DIAGNOSIS:  Left knee prepatellar bursitis. POSTOPERATIVE DIAGNOSIS:  Left knee prepatellar bursitis. OPERATION PERFORMED:  Incision and drainage of left knee prepatellar deep  bursitis. SURGEON:  Allie Hurtado MD    ASSISTANTMuita Ackerman Surgical Assistant. ANESTHESIA:  General anesthesia. ESTIMATED BLOOD LOSS:  Minimal.    COMPLICATIONS:  None. SPECIMENS:  Swab for culture and sensitivity, left knee bursitis. INDICATIONS:  This is a 26-year-old white male who presented with a left  anterior knee pain and swelling. He was diagnosed with septic bursitis. He was treated with p.o. antibiotic. He continued to have swelling and  pain. All risks, benefits, and alternatives were discussed with the  patient, and he agreed to proceed with the surgical treatment. OPERATIVE PROCEDURE:  The patient's left knee was marked. He received 2 gm  of Ancef IV preoperatively. The patient was brought to the operating room  and underwent general anesthesia. A well-padded tourniquet was placed,  left upper thigh. The left lower extremity was then prepped and draped in  a regular sterile routine fashion. A time-out was called, confirming the  patient's name, site, and procedure. A longitudinal incision was made over the anterior aspect of the left knee. A fairly clear fluid was encountered and swab was taken for culture and  sensitivity. No marva pus could be seen.   At this point, we used the  rongeur to perform an excision of the prepatellar bursa along with the  knife. At this point, we irrigated the bursa copiously with normal saline mixed  with gentamicin. After we finished the irrigation and debridement, we  loosely closed the skin with a 3-0 nylon. Dressing was then applied in the  form of Xeroform, 4x4, sterile Webril, and Ace wrap. The patient tolerated the procedure well and was taken to the recovery in  stable condition. POSTOPERATIVE PLAN:  The patient will be discharged home. He should be on  a p.o. antibiotics for 10 days. He can be weightbearing as tolerated, but  no heavy impact activities.         Edwige Gallardo MD    D: 07/15/2018 7:23:45       T: 07/15/2018 13:20:32     [email or web address]  Job#: [id number]     Doc#: [id number]    CC:

## 2018-07-27 ENCOUNTER — OFFICE VISIT (OUTPATIENT)
Dept: ORTHOPEDIC SURGERY | Age: 33
End: 2018-07-27

## 2018-07-27 VITALS — RESPIRATION RATE: 16 BRPM | HEIGHT: 74 IN | BODY MASS INDEX: 31.7 KG/M2 | WEIGHT: 247 LBS

## 2018-07-27 DIAGNOSIS — M71.162 SEPTIC PREPATELLAR BURSITIS OF LEFT KNEE: Primary | ICD-10-CM

## 2018-07-27 PROCEDURE — 99024 POSTOP FOLLOW-UP VISIT: CPT | Performed by: ORTHOPAEDIC SURGERY

## 2018-07-27 NOTE — PROGRESS NOTES
DIAGNOSIS:  Left knee prepatellar bursitis, s/p incision and drainage. DATE OF SURGERY:  7/13/2018. HISTORY OF PRESENT ILLNESS:  Mr. Tri Myers 26 y.o.  male who came in today for 2 weeks postoperative visit. The patient denies any significant pain in the left knee. He has been WBAT. No numbness or tingling sensation. No fever or Chills. His left knee cultures were negative and he completed antibiotics. 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 Past Medical History:   Diagnosis Date    Rapid heart beat     at times exam -       Past Surgical History:   Procedure Laterality Date    ENDOSCOPY, COLON, DIAGNOSTIC      MOUTH SURGERY         Social History     Social History    Marital status:      Spouse name: N/A    Number of children: N/A    Years of education: N/A     Occupational History    Not on file. Social History Main Topics    Smoking status: Never Smoker    Smokeless tobacco: Current User     Types: Snuff      Comment: dip    Alcohol use 4.8 - 7.2 oz/week     8 - 12 Cans of beer per week      Comment: 8-12 beers daily    Drug use: No    Sexual activity: Not on file     Other Topics Concern    Not on file     Social History Narrative    No narrative on file       Family History   Problem Relation Age of Onset    High Blood Pressure Father     Heart Disease Father     Stroke Father     Cancer Maternal Grandmother     Cancer Maternal Grandfather     Cancer Paternal Grandmother         lung       Current Outpatient Prescriptions on File Prior to Visit   Medication Sig Dispense Refill    NONFORMULARY 2 times daily quadracarn vitamin supplement Stopped for surgery last dose on 7/12/18      sulfamethoxazole-trimethoprim (BACTRIM DS;SEPTRA DS) 800-160 MG per tablet Take 1 tablet by mouth 2 times daily        No current facility-administered medications on file prior to visit.         Pertinent items are noted in HPI  Review of systems reviewed from Patient History Form dated on 7/11/2018 and available in the patient's chart under the Media tab. No change noted. PHYSICAL EXAMINATION:  Mr. Zehra Morales is a very pleasant 26 y.o.  male who presents today in no acute distress, awake, alert, and oriented. He is well dressed, nourished and  groomed. Patient with normal affect. Height is  6' 2\" (1.88 m), weight is 247 lb (112 kg), Body mass index is 31.71 kg/m². Resting respiratory rate is 16. The patient walks with no limp. The incision healing well . Suture was removed today, tolerated well. No signs of any erythema or drainage, moderate swelling. There are no signs of infection He has no pain with the active or passive range of motion of the left knee, good ROM. He has intact sensation distally, and he is neurovascularly intact. Good strength, and no instability both upper and lower extremities. IMPRESSION:  2 weeks out from left knee prepatellar bursitis, s/p incision and drainage, and doing very well. PLAN: He can go back to normal activity with no heavy impact activities for 6 weeks. Dressing change PRN. The patient will come back for a follow up in 6 weeks. The patient understands that there is a chance of abscess recurrence even after surgical I&D.       Rafael Farrar MD

## 2018-08-01 ENCOUNTER — TELEPHONE (OUTPATIENT)
Dept: ORTHOPEDIC SURGERY | Age: 33
End: 2018-08-01

## 2018-08-06 LAB
ANAEROBIC CULTURE: NORMAL
CULTURE SURGICAL: NORMAL
GRAM STAIN RESULT: NORMAL

## 2018-09-14 ENCOUNTER — OFFICE VISIT (OUTPATIENT)
Dept: ORTHOPEDIC SURGERY | Age: 33
End: 2018-09-14

## 2018-09-14 VITALS — HEIGHT: 74 IN | BODY MASS INDEX: 31.57 KG/M2 | TEMPERATURE: 98.3 F | WEIGHT: 246 LBS

## 2018-09-14 DIAGNOSIS — M71.162 SEPTIC PREPATELLAR BURSITIS OF LEFT KNEE: Primary | ICD-10-CM

## 2018-09-14 PROCEDURE — 99024 POSTOP FOLLOW-UP VISIT: CPT | Performed by: ORTHOPAEDIC SURGERY

## 2018-10-26 ENCOUNTER — OFFICE VISIT (OUTPATIENT)
Dept: FAMILY MEDICINE CLINIC | Age: 33
End: 2018-10-26
Payer: COMMERCIAL

## 2018-10-26 VITALS
HEART RATE: 76 BPM | WEIGHT: 263 LBS | SYSTOLIC BLOOD PRESSURE: 124 MMHG | HEIGHT: 74 IN | DIASTOLIC BLOOD PRESSURE: 74 MMHG | TEMPERATURE: 98.1 F | BODY MASS INDEX: 33.75 KG/M2

## 2018-10-26 DIAGNOSIS — I49.9 IRREGULAR HEART BEAT: Primary | ICD-10-CM

## 2018-10-26 DIAGNOSIS — R51.9 NONINTRACTABLE EPISODIC HEADACHE, UNSPECIFIED HEADACHE TYPE: ICD-10-CM

## 2018-10-26 PROCEDURE — 99213 OFFICE O/P EST LOW 20 MIN: CPT | Performed by: FAMILY MEDICINE

## 2018-10-26 PROCEDURE — 93000 ELECTROCARDIOGRAM COMPLETE: CPT | Performed by: FAMILY MEDICINE

## 2018-10-26 ASSESSMENT — PATIENT HEALTH QUESTIONNAIRE - PHQ9
SUM OF ALL RESPONSES TO PHQ QUESTIONS 1-9: 0
2. FEELING DOWN, DEPRESSED OR HOPELESS: 0
SUM OF ALL RESPONSES TO PHQ QUESTIONS 1-9: 0
1. LITTLE INTEREST OR PLEASURE IN DOING THINGS: 0
SUM OF ALL RESPONSES TO PHQ9 QUESTIONS 1 & 2: 0

## 2018-12-14 ENCOUNTER — TELEPHONE (OUTPATIENT)
Dept: FAMILY MEDICINE CLINIC | Age: 33
End: 2018-12-14

## 2018-12-14 DIAGNOSIS — F41.9 ANXIETY: Primary | ICD-10-CM

## 2018-12-14 RX ORDER — DIAZEPAM 5 MG/1
5 TABLET ORAL ONCE
Qty: 1 TABLET | Refills: 0 | Status: SHIPPED | OUTPATIENT
Start: 2018-12-14 | End: 2018-12-14

## 2018-12-21 ENCOUNTER — OFFICE VISIT (OUTPATIENT)
Dept: FAMILY MEDICINE CLINIC | Age: 33
End: 2018-12-21
Payer: COMMERCIAL

## 2018-12-21 VITALS
HEIGHT: 74 IN | TEMPERATURE: 97.8 F | DIASTOLIC BLOOD PRESSURE: 88 MMHG | WEIGHT: 260 LBS | BODY MASS INDEX: 33.37 KG/M2 | SYSTOLIC BLOOD PRESSURE: 138 MMHG

## 2018-12-21 DIAGNOSIS — J01.00 ACUTE NON-RECURRENT MAXILLARY SINUSITIS: Primary | ICD-10-CM

## 2018-12-21 PROCEDURE — 99213 OFFICE O/P EST LOW 20 MIN: CPT | Performed by: FAMILY MEDICINE

## 2018-12-21 RX ORDER — AMOXICILLIN AND CLAVULANATE POTASSIUM 875; 125 MG/1; MG/1
1 TABLET, FILM COATED ORAL 2 TIMES DAILY
Qty: 20 TABLET | Refills: 0 | Status: SHIPPED | OUTPATIENT
Start: 2018-12-21 | End: 2018-12-31

## 2019-01-31 ENCOUNTER — OFFICE VISIT (OUTPATIENT)
Dept: FAMILY MEDICINE CLINIC | Age: 34
End: 2019-01-31
Payer: COMMERCIAL

## 2019-01-31 VITALS
HEART RATE: 72 BPM | WEIGHT: 255.8 LBS | BODY MASS INDEX: 32.83 KG/M2 | HEIGHT: 74 IN | SYSTOLIC BLOOD PRESSURE: 134 MMHG | TEMPERATURE: 98.1 F | DIASTOLIC BLOOD PRESSURE: 84 MMHG

## 2019-01-31 DIAGNOSIS — M79.672 PAIN IN BOTH FEET: Primary | ICD-10-CM

## 2019-01-31 DIAGNOSIS — M79.671 PAIN IN BOTH FEET: Primary | ICD-10-CM

## 2019-01-31 PROCEDURE — 99213 OFFICE O/P EST LOW 20 MIN: CPT | Performed by: FAMILY MEDICINE

## 2019-01-31 RX ORDER — INDOMETHACIN 25 MG/1
25 CAPSULE ORAL 3 TIMES DAILY PRN
Qty: 30 CAPSULE | Refills: 0 | Status: SHIPPED | OUTPATIENT
Start: 2019-01-31 | End: 2019-04-24 | Stop reason: ALTCHOICE

## 2019-04-24 ENCOUNTER — APPOINTMENT (OUTPATIENT)
Dept: GENERAL RADIOLOGY | Age: 34
End: 2019-04-24
Payer: COMMERCIAL

## 2019-04-24 ENCOUNTER — HOSPITAL ENCOUNTER (EMERGENCY)
Age: 34
Discharge: HOME | End: 2019-04-24
Attending: EMERGENCY MEDICINE
Payer: COMMERCIAL

## 2019-04-24 VITALS
BODY MASS INDEX: 33.39 KG/M2 | SYSTOLIC BLOOD PRESSURE: 132 MMHG | HEIGHT: 74 IN | HEART RATE: 81 BPM | DIASTOLIC BLOOD PRESSURE: 89 MMHG | TEMPERATURE: 96.9 F | WEIGHT: 260.14 LBS | OXYGEN SATURATION: 96 % | RESPIRATION RATE: 14 BRPM

## 2019-04-24 DIAGNOSIS — I48.0 PAROXYSMAL ATRIAL FIBRILLATION (HCC): ICD-10-CM

## 2019-04-24 DIAGNOSIS — I48.91 NEW ONSET A-FIB (HCC): Primary | ICD-10-CM

## 2019-04-24 DIAGNOSIS — S20.211A CONTUSION OF RIGHT CHEST WALL, INITIAL ENCOUNTER: Primary | ICD-10-CM

## 2019-04-24 LAB
A/G RATIO: 1.2 (ref 1.1–2.2)
ALBUMIN SERPL-MCNC: 4.3 G/DL (ref 3.4–5)
ALP BLD-CCNC: 99 U/L (ref 40–129)
ALT SERPL-CCNC: 117 U/L (ref 10–40)
ANION GAP SERPL CALCULATED.3IONS-SCNC: 11 MMOL/L (ref 3–16)
AST SERPL-CCNC: 99 U/L (ref 15–37)
BILIRUB SERPL-MCNC: 0.6 MG/DL (ref 0–1)
BUN BLDV-MCNC: 12 MG/DL (ref 7–20)
CALCIUM SERPL-MCNC: 9.3 MG/DL (ref 8.3–10.6)
CHLORIDE BLD-SCNC: 105 MMOL/L (ref 99–110)
CO2: 21 MMOL/L (ref 21–32)
CREAT SERPL-MCNC: 0.8 MG/DL (ref 0.9–1.3)
EKG ATRIAL RATE: 374 BPM
EKG DIAGNOSIS: NORMAL
EKG Q-T INTERVAL: 340 MS
EKG QRS DURATION: 90 MS
EKG QTC CALCULATION (BAZETT): 478 MS
EKG R AXIS: 51 DEGREES
EKG T AXIS: 20 DEGREES
EKG VENTRICULAR RATE: 119 BPM
GFR AFRICAN AMERICAN: >60
GFR NON-AFRICAN AMERICAN: >60
GLOBULIN: 3.5 G/DL
GLUCOSE BLD-MCNC: 161 MG/DL (ref 70–99)
HCT VFR BLD CALC: 47.6 % (ref 40.5–52.5)
HEMOGLOBIN: 16.5 G/DL (ref 13.5–17.5)
MCH RBC QN AUTO: 31.6 PG (ref 26–34)
MCHC RBC AUTO-ENTMCNC: 34.6 G/DL (ref 31–36)
MCV RBC AUTO: 91.2 FL (ref 80–100)
PDW BLD-RTO: 12.6 % (ref 12.4–15.4)
PLATELET # BLD: 255 K/UL (ref 135–450)
PMV BLD AUTO: 8.5 FL (ref 5–10.5)
POTASSIUM REFLEX MAGNESIUM: 4.8 MMOL/L (ref 3.5–5.1)
RBC # BLD: 5.22 M/UL (ref 4.2–5.9)
SODIUM BLD-SCNC: 137 MMOL/L (ref 136–145)
TOTAL PROTEIN: 7.8 G/DL (ref 6.4–8.2)
TSH SERPL DL<=0.05 MIU/L-ACNC: 3 UIU/ML (ref 0.27–4.2)
WBC # BLD: 13.3 K/UL (ref 4–11)

## 2019-04-24 PROCEDURE — 85027 COMPLETE CBC AUTOMATED: CPT

## 2019-04-24 PROCEDURE — 80053 COMPREHEN METABOLIC PANEL: CPT

## 2019-04-24 PROCEDURE — 36415 COLL VENOUS BLD VENIPUNCTURE: CPT

## 2019-04-24 PROCEDURE — 84443 ASSAY THYROID STIM HORMONE: CPT

## 2019-04-24 PROCEDURE — 99284 EMERGENCY DEPT VISIT MOD MDM: CPT

## 2019-04-24 PROCEDURE — 96372 THER/PROPH/DIAG INJ SC/IM: CPT

## 2019-04-24 PROCEDURE — 93005 ELECTROCARDIOGRAM TRACING: CPT | Performed by: EMERGENCY MEDICINE

## 2019-04-24 PROCEDURE — 6370000000 HC RX 637 (ALT 250 FOR IP): Performed by: EMERGENCY MEDICINE

## 2019-04-24 PROCEDURE — 6360000002 HC RX W HCPCS: Performed by: EMERGENCY MEDICINE

## 2019-04-24 PROCEDURE — 71101 X-RAY EXAM UNILAT RIBS/CHEST: CPT

## 2019-04-24 PROCEDURE — 93010 ELECTROCARDIOGRAM REPORT: CPT | Performed by: INTERNAL MEDICINE

## 2019-04-24 RX ORDER — ASPIRIN 81 MG/1
324 TABLET, CHEWABLE ORAL ONCE
Status: COMPLETED | OUTPATIENT
Start: 2019-04-24 | End: 2019-04-24

## 2019-04-24 RX ORDER — ACETAMINOPHEN 500 MG
1000 TABLET ORAL ONCE
Status: COMPLETED | OUTPATIENT
Start: 2019-04-24 | End: 2019-04-24

## 2019-04-24 RX ORDER — NAPROXEN 500 MG/1
500 TABLET ORAL 2 TIMES DAILY
Qty: 30 TABLET | Refills: 0 | Status: ON HOLD | OUTPATIENT
Start: 2019-04-24 | End: 2020-03-07

## 2019-04-24 RX ORDER — KETOROLAC TROMETHAMINE 30 MG/ML
30 INJECTION, SOLUTION INTRAMUSCULAR; INTRAVENOUS ONCE
Status: COMPLETED | OUTPATIENT
Start: 2019-04-24 | End: 2019-04-24

## 2019-04-24 RX ORDER — DILTIAZEM HYDROCHLORIDE 180 MG/1
180 CAPSULE, COATED, EXTENDED RELEASE ORAL DAILY
Qty: 30 CAPSULE | Refills: 0 | Status: SHIPPED | OUTPATIENT
Start: 2019-04-24 | End: 2022-05-16

## 2019-04-24 RX ORDER — TRAMADOL HYDROCHLORIDE 50 MG/1
50 TABLET ORAL EVERY 6 HOURS PRN
Qty: 12 TABLET | Refills: 0 | Status: SHIPPED | OUTPATIENT
Start: 2019-04-24 | End: 2019-04-27

## 2019-04-24 RX ORDER — DILTIAZEM HYDROCHLORIDE 180 MG/1
180 CAPSULE, COATED, EXTENDED RELEASE ORAL ONCE
Status: DISCONTINUED | OUTPATIENT
Start: 2019-04-24 | End: 2019-04-24 | Stop reason: RX

## 2019-04-24 RX ADMIN — KETOROLAC TROMETHAMINE 30 MG: 30 INJECTION, SOLUTION INTRAMUSCULAR; INTRAVENOUS at 10:22

## 2019-04-24 RX ADMIN — ASPIRIN 81 MG 324 MG: 81 TABLET ORAL at 11:26

## 2019-04-24 RX ADMIN — ACETAMINOPHEN 1000 MG: 500 TABLET ORAL at 10:20

## 2019-04-24 ASSESSMENT — PAIN DESCRIPTION - ONSET: ONSET: PROGRESSIVE

## 2019-04-24 ASSESSMENT — PAIN SCALES - GENERAL
PAINLEVEL_OUTOF10: 7
PAINLEVEL_OUTOF10: 5
PAINLEVEL_OUTOF10: 8
PAINLEVEL_OUTOF10: 7
PAINLEVEL_OUTOF10: 5

## 2019-04-24 ASSESSMENT — PAIN DESCRIPTION - LOCATION: LOCATION: BACK

## 2019-04-24 ASSESSMENT — PAIN DESCRIPTION - PROGRESSION: CLINICAL_PROGRESSION: GRADUALLY WORSENING

## 2019-04-24 ASSESSMENT — PAIN DESCRIPTION - ORIENTATION: ORIENTATION: RIGHT;MID

## 2019-04-24 ASSESSMENT — PAIN DESCRIPTION - PAIN TYPE: TYPE: ACUTE PAIN

## 2019-04-24 ASSESSMENT — PAIN DESCRIPTION - DESCRIPTORS: DESCRIPTORS: TENDER

## 2019-04-24 NOTE — ED NOTES
Dr. Fernando Berumen called [phone], spoke to Felisa Norton, to page cardiologist.     Grant Garber RN  04/24/19 7150

## 2019-04-24 NOTE — ED TRIAGE NOTES
Pt. C/o tripping over dog and fell backwards hitting right lower back on dresser on Sunday night, seen at Urgent Care last night, had Xray and blood work done, [facility] couldn't read the [employer] and today my pain is worse. Pain right lower back radiating into right shoulder blade and around to upper chest.  Pt. Appears anxious and upset. Drinking [employer].

## 2019-04-24 NOTE — ED PROVIDER NOTES
Emergency Department provider note:    Alhaji Peguero (pt. fell on Sunday night, abrasion right lower back, pain right back into shoulder blade and upper chest, seen at Urgent Care last night, did not get Rx filled)      HISTORY OF PRESENT ILLNESS  Trey Hussein is a 26 y.o. male who presents to the ED with an injury to the right side of his chest wall. He states he injured it 4 days ago, hitting the sharp edge of it for at home. He's had pain in the area since that time, but now it is radiating around towards his front and up towards the top of his chest on the right side. No prior, no other injury. It catches everyone smiling give some tremendous pain. He rates it at a maximum of 8 out of 10. His inability to take a deep breath makes him feel short of breath. He's not had hemoptysis, or any ongoing difficulty breathing. He's had a history of rapid heart rate, which is wife is felt at times. It occasionally has irregular. He's been told that he had a rapid heart rate in the past but no diagnosis has been given to him. He's also had a history of elevated blood pressure. No other complaints, modifying factors or associated symptoms. Nursing notes reviewed.    Past Medical History:   Diagnosis Date    Rapid heart beat     at times exam -     Past Surgical History:   Procedure Laterality Date    ENDOSCOPY, COLON, DIAGNOSTIC      KNEE ARTHROSCOPY Left     MOUTH SURGERY       Family History   Problem Relation Age of Onset    High Blood Pressure Father     Heart Disease Father     Stroke Father     Cancer Maternal Grandmother     Cancer Maternal Grandfather     Cancer Paternal Grandmother         lung     Social History     Socioeconomic History    Marital status:      Spouse name: Not on file    Number of children: Not on file    Years of education: Not on file    Highest education level: Not on file   Occupational History    He works doing sanding and floor repair. He's , but here by himself. Social Needs    Financial resource strain: Not on file    Food insecurity:     Worry: Not on file     Inability: Not on file    Transportation needs:     Medical: Not on file     Non-medical: Not on file   Tobacco Use    Smoking status: Never Smoker    Smokeless tobacco: Current User     Types: Snuff    Tobacco comment: dip   Substance and Sexual Activity    Alcohol use: Yes     Alcohol/week: 4.8 - 7.2 oz     Types: 8 - 12 Cans of beer per week     Comment: 8-12 beers daily    Drug use: No    Sexual activity: Not on file   Lifestyle    Physical activity:     Days per week: Not on file     Minutes per session: Not on file    Stress: Not on file   Relationships    Social connections:     Talks on phone: Not on file     Gets together: Not on file     Attends Shinto service: Not on file     Active member of club or organization: Not on file     Attends meetings of clubs or organizations: Not on file     Relationship status: Not on file    Intimate partner violence:     Fear of current or ex partner: Not on file     Emotionally abused: Not on file     Physically abused: Not on file     Forced sexual activity: Not on file   Other Topics Concern    Not on file   Social History Narrative    Not on file     No current facility-administered medications for this encounter. Current Outpatient Medications   Medication Sig Dispense Refill    traMADol (ULTRAM) 50 MG tablet Take 1 tablet by mouth every 6 hours as needed for Pain for up to 3 days. Intended supply: 3 days. Take lowest dose possible to manage pain 12 tablet 0    naproxen (NAPROSYN) 500 MG tablet Take 1 tablet by mouth 2 times daily 30 tablet 0    diltiazem (CARDIZEM CD) 180 MG extended release capsule Take 1 capsule by mouth daily 30 capsule 0     Allergies   Allergen Reactions    Nickel Rash     Pt develops rash when nickel buckle comes in contact with skin. REVIEW OF SYSTEMS  10 systems reviewed, pertinent positives per HPI otherwise noted to be negative    PHYSICAL EXAM  /89   Pulse 81   Temp 96.9 °F (36.1 °C) (Oral)   Resp 14   Ht 6' 2\" (1.88 m)   Wt 260 lb 2.3 oz (118 kg)   SpO2 96%   BMI 33.40 kg/m²   GENERAL APPEARANCE: Awake and alert. His blood pressure and pulse are both elevated, and palpating his pulse is irregular. Cooperative. No acute distress. HEAD: Normocephalic. Atraumatic. EYES: EOM's grossly intact. Pupils are midsize and reactive. ENT: Mucous membranes are moist.  Pharynx is not erythematous. NECK: Supple. Normal ROM. No JVD or adenopathy. CHEST: Equal symmetric chest rise. He has an L-shaped abrasion and contusion to his right posterior chest, just medial to his posterior axillary line, and about 4 x 4 centimeters in size. He has no local crepitance. He is able to fully expand both sides of his chest.  LUNGS: Breathing is unlabored. Speaking comfortably in full sentences. Has clear breath sounds which are equal bilaterally. No wheezes, rales, or rhonchi. HEART:  His heart rhythm is irregular on palpation. On auscultation I hear no murmur or rub. ABDOMEN: Nondistended. No masses. Obese and nontender. No CVA tenderness. EXTREMITIES: Moves actively. No acute deformities. SKIN: Warm and dry. NEUROLOGICAL: Alert and oriented. Strength is 5/5 in all extremities and sensation is intact.     LABS  Results for orders placed or performed during the hospital encounter of 04/24/19   CBC   Result Value Ref Range    WBC 13.3 (H) 4.0 - 11.0 K/uL    RBC 5.22 4.20 - 5.90 M/uL    Hemoglobin 16.5 13.5 - 17.5 g/dL    Hematocrit 47.6 40.5 - 52.5 %    MCV 91.2 80.0 - 100.0 fL    MCH 31.6 26.0 - 34.0 pg    MCHC 34.6 31.0 - 36.0 g/dL    RDW 12.6 12.4 - 15.4 %    Platelets 721 262 - 481 K/uL    MPV 8.5 5.0 - 10.5 fL   Comprehensive Metabolic Panel w/ Reflex to MG   Result Value Ref Range    Sodium 137 136 - 145 mmol/L Potassium reflex Magnesium 4.8 3.5 - 5.1 mmol/L    Chloride 105 99 - 110 mmol/L    CO2 21 21 - 32 mmol/L    Anion Gap 11 3 - 16    Glucose 161 (H) 70 - 99 mg/dL    BUN 12 7 - 20 mg/dL    CREATININE 0.8 (L) 0.9 - 1.3 mg/dL    GFR Non-African American >60 >60    GFR African American >60 >60    Calcium 9.3 8.3 - 10.6 mg/dL    Total Protein 7.8 6.4 - 8.2 g/dL    Alb 4.3 3.4 - 5.0 g/dL    Albumin/Globulin Ratio 1.2 1.1 - 2.2    Total Bilirubin 0.6 0.0 - 1.0 mg/dL    Alkaline Phosphatase 99 40 - 129 U/L     (H) 10 - 40 U/L    AST 99 (H) 15 - 37 U/L    Globulin 3.5 g/dL   EKG 12 Lead   Result Value Ref Range    Ventricular Rate 119 BPM    Atrial Rate 374 BPM    QRS Duration 90 ms    Q-T Interval 340 ms    QTc Calculation (Bazett) 478 ms    R Axis 51 degrees    T Axis 20 degrees    Diagnosis       Atrial fibrillation with premature ventricular or aberrantly conducted complexesNonspecific ST and T wave abnormalityProlonged QTAbnormal ECGNo previous ECGs availableConfirmed by [facility] ([id number]) on 4/24/2019 11:19:22 AM       RADIOLOGY  XR RIBS RIGHT INCLUDE CHEST (MIN 3 VIEWS)   Final Result   No acute findings         I ordered and interpreted the chest x-ray. No fractures are found, and no pneumothorax. This was confirmed by radiology    The Ekg interpreted by me in the absence of a cardiologist shows. atrial fibrillation with a rate of 120  Axis is   Normal  QTc is  normal  Intervals and Durations are unremarkable. No specific ST-T wave changes appreciated. No evidence of acute ischemia. No prior EKG. The patient had serial reevaluation's done and a number of explanations of his condition. His family history is positive for atrial fibrillation, and the patient knows a lot about atrial fibrillation because his dad has that issue. His has been on Coumadin and the patient does not want to be on Coumadin. He works and doesn't want the bleeding risk.   He understands the risk of atrial fibrillation, medications. New Prescriptions    DILTIAZEM (CARDIZEM CD) 180 MG EXTENDED RELEASE CAPSULE    Take 1 capsule by mouth daily    NAPROXEN (NAPROSYN) 500 MG TABLET    Take 1 tablet by mouth 2 times daily    TRAMADOL (ULTRAM) 50 MG TABLET    Take 1 tablet by mouth every 6 hours as needed for Pain for up to 3 days. Intended supply: 3 days. Take lowest dose possible to manage pain           CLINICAL IMPRESSION  1. Contusion of right chest wall, initial encounter    2. Paroxysmal atrial fibrillation (HCC)        Blood pressure 132/89, pulse 81, temperature 96.9 °F (36.1 °C), temperature source Oral, resp. rate 14, height 6' 2\" (1.88 m), weight 260 lb 2.3 oz (118 kg), SpO2 96 %. DISPOSITION Decision To Discharge 04/24/2019 12:09:24 PM      Comment: Please note this report has been produced using speech recognition software and may contain errors related to that system including errors in grammar, punctuation, and spelling, as well as words and phrases that may be inappropriate. If there are any questions or concerns please feel free to contact the dictating provider for clarification.         Tiffanie Vaughan MD  04/24/19 2537

## 2019-04-24 NOTE — ED NOTES
Pt. Tearful and afraid, unable to reach wife who is out of town. Placed on cardiac monitor.      Render ODILIA Currie  04/24/19 6669

## 2020-03-06 ENCOUNTER — HOSPITAL ENCOUNTER (OUTPATIENT)
Age: 35
Setting detail: OBSERVATION
Discharge: HOME | End: 2020-03-08
Attending: EMERGENCY MEDICINE | Admitting: INTERNAL MEDICINE
Payer: COMMERCIAL

## 2020-03-06 LAB
ALBUMIN SERPL-MCNC: 4.1 G/DL (ref 3.4–5)
ALP BLD-CCNC: 92 U/L (ref 40–129)
ALT SERPL-CCNC: 102 U/L (ref 10–40)
ANION GAP SERPL CALCULATED.3IONS-SCNC: 21 MMOL/L (ref 3–16)
AST SERPL-CCNC: 76 U/L (ref 15–37)
BASE EXCESS VENOUS: -3 MMOL/L (ref -3–3)
BASOPHILS ABSOLUTE: 0.1 K/UL (ref 0–0.2)
BASOPHILS RELATIVE PERCENT: 0.5 %
BILIRUB SERPL-MCNC: 0.5 MG/DL (ref 0–1)
BILIRUBIN DIRECT: <0.2 MG/DL (ref 0–0.3)
BILIRUBIN, INDIRECT: ABNORMAL MG/DL (ref 0–1)
BUN BLDV-MCNC: 10 MG/DL (ref 7–20)
CALCIUM SERPL-MCNC: 8.7 MG/DL (ref 8.3–10.6)
CHLORIDE BLD-SCNC: 92 MMOL/L (ref 99–110)
CO2: 19 MMOL/L (ref 21–32)
CREAT SERPL-MCNC: 0.7 MG/DL (ref 0.9–1.3)
EOSINOPHILS ABSOLUTE: 0.2 K/UL (ref 0–0.6)
EOSINOPHILS RELATIVE PERCENT: 1.5 %
ETHANOL: 237 MG/DL (ref 0–0.08)
GFR AFRICAN AMERICAN: >60
GFR NON-AFRICAN AMERICAN: >60
GLUCOSE BLD-MCNC: 137 MG/DL (ref 70–99)
HCO3 VENOUS: 22.4 MMOL/L (ref 23–29)
HCT VFR BLD CALC: 44.3 % (ref 40.5–52.5)
HEMOGLOBIN: 14.9 G/DL (ref 13.5–17.5)
LIPASE: 21 U/L (ref 13–60)
LYMPHOCYTES ABSOLUTE: 3.6 K/UL (ref 1–5.1)
LYMPHOCYTES RELATIVE PERCENT: 24 %
MCH RBC QN AUTO: 31.5 PG (ref 26–34)
MCHC RBC AUTO-ENTMCNC: 33.7 G/DL (ref 31–36)
MCV RBC AUTO: 93.3 FL (ref 80–100)
MONOCYTES ABSOLUTE: 1.2 K/UL (ref 0–1.3)
MONOCYTES RELATIVE PERCENT: 8 %
NEUTROPHILS ABSOLUTE: 9.7 K/UL (ref 1.7–7.7)
NEUTROPHILS RELATIVE PERCENT: 66 %
O2 SAT, VEN: 99 %
O2 THERAPY: ABNORMAL
PCO2, VEN: 37.3 MMHG (ref 40–50)
PDW BLD-RTO: 11.5 % (ref 12.4–15.4)
PH VENOUS: 7.39 (ref 7.35–7.45)
PLATELET # BLD: 276 K/UL (ref 135–450)
PMV BLD AUTO: 7.9 FL (ref 5–10.5)
PO2, VEN: 121 MMHG (ref 25–40)
POTASSIUM REFLEX MAGNESIUM: 4.1 MMOL/L (ref 3.5–5.1)
RBC # BLD: 4.75 M/UL (ref 4.2–5.9)
SODIUM BLD-SCNC: 132 MMOL/L (ref 136–145)
TCO2 CALC VENOUS: 24 MMOL/L
TOTAL PROTEIN: 7.6 G/DL (ref 6.4–8.2)
WBC # BLD: 14.8 K/UL (ref 4–11)

## 2020-03-06 PROCEDURE — 80076 HEPATIC FUNCTION PANEL: CPT

## 2020-03-06 PROCEDURE — 99285 EMERGENCY DEPT VISIT HI MDM: CPT

## 2020-03-06 PROCEDURE — 82803 BLOOD GASES ANY COMBINATION: CPT

## 2020-03-06 PROCEDURE — 83690 ASSAY OF LIPASE: CPT

## 2020-03-06 PROCEDURE — 93005 ELECTROCARDIOGRAM TRACING: CPT | Performed by: EMERGENCY MEDICINE

## 2020-03-06 PROCEDURE — 85025 COMPLETE CBC W/AUTO DIFF WBC: CPT

## 2020-03-06 PROCEDURE — 80048 BASIC METABOLIC PNL TOTAL CA: CPT

## 2020-03-06 PROCEDURE — 80307 DRUG TEST PRSMV CHEM ANLYZR: CPT

## 2020-03-06 PROCEDURE — 81001 URINALYSIS AUTO W/SCOPE: CPT

## 2020-03-06 PROCEDURE — G0480 DRUG TEST DEF 1-7 CLASSES: HCPCS

## 2020-03-06 PROCEDURE — 36415 COLL VENOUS BLD VENIPUNCTURE: CPT

## 2020-03-06 PROCEDURE — 2580000003 HC RX 258: Performed by: EMERGENCY MEDICINE

## 2020-03-06 RX ORDER — LISINOPRIL 20 MG/1
20 TABLET ORAL DAILY
COMMUNITY
Start: 2019-06-30 | End: 2021-07-23

## 2020-03-06 RX ORDER — FLECAINIDE ACETATE 50 MG/1
50 TABLET ORAL 2 TIMES DAILY
COMMUNITY
Start: 2019-10-31

## 2020-03-06 RX ORDER — 0.9 % SODIUM CHLORIDE 0.9 %
1000 INTRAVENOUS SOLUTION INTRAVENOUS ONCE
Status: COMPLETED | OUTPATIENT
Start: 2020-03-06 | End: 2020-03-06

## 2020-03-06 RX ADMIN — SODIUM CHLORIDE 1000 ML: 9 INJECTION, SOLUTION INTRAVENOUS at 22:21

## 2020-03-06 ASSESSMENT — PAIN SCALES - GENERAL: PAINLEVEL_OUTOF10: 0

## 2020-03-07 PROBLEM — T50.902A SUICIDAL OVERDOSE, INITIAL ENCOUNTER (HCC): Status: ACTIVE | Noted: 2020-03-07

## 2020-03-07 LAB
A/G RATIO: 1.2 (ref 1.1–2.2)
ACETAMINOPHEN LEVEL: <5 UG/ML (ref 10–30)
ALBUMIN SERPL-MCNC: 3.8 G/DL (ref 3.4–5)
ALP BLD-CCNC: 86 U/L (ref 40–129)
ALT SERPL-CCNC: 92 U/L (ref 10–40)
AMPHETAMINE SCREEN, URINE: NORMAL
ANION GAP SERPL CALCULATED.3IONS-SCNC: 18 MMOL/L (ref 3–16)
AST SERPL-CCNC: 66 U/L (ref 15–37)
BARBITURATE SCREEN URINE: NORMAL
BASOPHILS ABSOLUTE: 0.1 K/UL (ref 0–0.2)
BASOPHILS RELATIVE PERCENT: 0.4 %
BENZODIAZEPINE SCREEN, URINE: NORMAL
BILIRUB SERPL-MCNC: 0.3 MG/DL (ref 0–1)
BILIRUBIN URINE: NEGATIVE
BLOOD, URINE: ABNORMAL
BUN BLDV-MCNC: 10 MG/DL (ref 7–20)
CALCIUM SERPL-MCNC: 8.3 MG/DL (ref 8.3–10.6)
CANNABINOID SCREEN URINE: NORMAL
CHLORIDE BLD-SCNC: 94 MMOL/L (ref 99–110)
CLARITY: CLEAR
CO2: 20 MMOL/L (ref 21–32)
COCAINE METABOLITE SCREEN URINE: NORMAL
COLOR: YELLOW
CREAT SERPL-MCNC: 0.8 MG/DL (ref 0.9–1.3)
EKG ATRIAL RATE: 87 BPM
EKG DIAGNOSIS: NORMAL
EKG P AXIS: 49 DEGREES
EKG P-R INTERVAL: 176 MS
EKG Q-T INTERVAL: 374 MS
EKG QRS DURATION: 102 MS
EKG QTC CALCULATION (BAZETT): 450 MS
EKG R AXIS: 76 DEGREES
EKG T AXIS: 13 DEGREES
EKG VENTRICULAR RATE: 87 BPM
EOSINOPHILS ABSOLUTE: 0.2 K/UL (ref 0–0.6)
EOSINOPHILS RELATIVE PERCENT: 1.6 %
ETHANOL: 176 MG/DL (ref 0–0.08)
GFR AFRICAN AMERICAN: >60
GFR NON-AFRICAN AMERICAN: >60
GLOBULIN: 3.2 G/DL
GLUCOSE BLD-MCNC: 149 MG/DL (ref 70–99)
GLUCOSE URINE: NEGATIVE MG/DL
HCT VFR BLD CALC: 41.1 % (ref 40.5–52.5)
HEMOGLOBIN: 14.3 G/DL (ref 13.5–17.5)
KETONES, URINE: NEGATIVE MG/DL
LEUKOCYTE ESTERASE, URINE: NEGATIVE
LYMPHOCYTES ABSOLUTE: 3.7 K/UL (ref 1–5.1)
LYMPHOCYTES RELATIVE PERCENT: 27.1 %
Lab: NORMAL
MCH RBC QN AUTO: 32.4 PG (ref 26–34)
MCHC RBC AUTO-ENTMCNC: 34.8 G/DL (ref 31–36)
MCV RBC AUTO: 93.1 FL (ref 80–100)
METHADONE SCREEN, URINE: NORMAL
MICROSCOPIC EXAMINATION: YES
MONOCYTES ABSOLUTE: 1.1 K/UL (ref 0–1.3)
MONOCYTES RELATIVE PERCENT: 7.8 %
NEUTROPHILS ABSOLUTE: 8.7 K/UL (ref 1.7–7.7)
NEUTROPHILS RELATIVE PERCENT: 63.1 %
NITRITE, URINE: NEGATIVE
OPIATE SCREEN URINE: NORMAL
OXYCODONE URINE: NORMAL
PDW BLD-RTO: 12.3 % (ref 12.4–15.4)
PH UA: 5
PH UA: 5 (ref 5–8)
PHENCYCLIDINE SCREEN URINE: NORMAL
PLATELET # BLD: 240 K/UL (ref 135–450)
PMV BLD AUTO: 8 FL (ref 5–10.5)
POTASSIUM SERPL-SCNC: 3.9 MMOL/L (ref 3.5–5.1)
PROPOXYPHENE SCREEN: NORMAL
PROTEIN UA: NEGATIVE MG/DL
RBC # BLD: 4.41 M/UL (ref 4.2–5.9)
RBC UA: NORMAL /HPF (ref 0–4)
SALICYLATE, SERUM: <0.3 MG/DL (ref 15–30)
SODIUM BLD-SCNC: 132 MMOL/L (ref 136–145)
SPECIFIC GRAVITY UA: <=1.005 (ref 1–1.03)
TOTAL PROTEIN: 7 G/DL (ref 6.4–8.2)
URINE TYPE: ABNORMAL
UROBILINOGEN, URINE: 0.2 E.U./DL
WBC # BLD: 13.8 K/UL (ref 4–11)
WBC UA: NORMAL /HPF (ref 0–5)

## 2020-03-07 PROCEDURE — 6370000000 HC RX 637 (ALT 250 FOR IP): Performed by: HOSPITALIST

## 2020-03-07 PROCEDURE — G0378 HOSPITAL OBSERVATION PER HR: HCPCS

## 2020-03-07 PROCEDURE — 2580000003 HC RX 258: Performed by: INTERNAL MEDICINE

## 2020-03-07 PROCEDURE — 6370000000 HC RX 637 (ALT 250 FOR IP): Performed by: PSYCHIATRY & NEUROLOGY

## 2020-03-07 PROCEDURE — 94761 N-INVAS EAR/PLS OXIMETRY MLT: CPT

## 2020-03-07 PROCEDURE — 36415 COLL VENOUS BLD VENIPUNCTURE: CPT

## 2020-03-07 PROCEDURE — 96374 THER/PROPH/DIAG INJ IV PUSH: CPT

## 2020-03-07 PROCEDURE — G0480 DRUG TEST DEF 1-7 CLASSES: HCPCS

## 2020-03-07 PROCEDURE — 80053 COMPREHEN METABOLIC PANEL: CPT

## 2020-03-07 PROCEDURE — 2500000003 HC RX 250 WO HCPCS: Performed by: HOSPITALIST

## 2020-03-07 PROCEDURE — 85025 COMPLETE CBC W/AUTO DIFF WBC: CPT

## 2020-03-07 PROCEDURE — 93010 ELECTROCARDIOGRAM REPORT: CPT | Performed by: INTERNAL MEDICINE

## 2020-03-07 PROCEDURE — 99253 IP/OBS CNSLTJ NEW/EST LOW 45: CPT | Performed by: PSYCHIATRY & NEUROLOGY

## 2020-03-07 RX ORDER — PANTOPRAZOLE SODIUM 40 MG/1
40 TABLET, DELAYED RELEASE ORAL
Status: DISCONTINUED | OUTPATIENT
Start: 2020-03-08 | End: 2020-03-09 | Stop reason: HOSPADM

## 2020-03-07 RX ORDER — PROMETHAZINE HYDROCHLORIDE 25 MG/1
12.5 TABLET ORAL EVERY 6 HOURS PRN
Status: DISCONTINUED | OUTPATIENT
Start: 2020-03-07 | End: 2020-03-09 | Stop reason: HOSPADM

## 2020-03-07 RX ORDER — POLYETHYLENE GLYCOL 3350 17 G/17G
17 POWDER, FOR SOLUTION ORAL DAILY PRN
Status: DISCONTINUED | OUTPATIENT
Start: 2020-03-07 | End: 2020-03-09 | Stop reason: HOSPADM

## 2020-03-07 RX ORDER — ACETAMINOPHEN 650 MG/1
650 SUPPOSITORY RECTAL EVERY 6 HOURS PRN
Status: DISCONTINUED | OUTPATIENT
Start: 2020-03-07 | End: 2020-03-09 | Stop reason: HOSPADM

## 2020-03-07 RX ORDER — SODIUM CHLORIDE 0.9 % (FLUSH) 0.9 %
10 SYRINGE (ML) INJECTION PRN
Status: DISCONTINUED | OUTPATIENT
Start: 2020-03-07 | End: 2020-03-09 | Stop reason: HOSPADM

## 2020-03-07 RX ORDER — ACETAMINOPHEN 325 MG/1
650 TABLET ORAL EVERY 6 HOURS PRN
Status: DISCONTINUED | OUTPATIENT
Start: 2020-03-07 | End: 2020-03-09 | Stop reason: HOSPADM

## 2020-03-07 RX ORDER — ZOLPIDEM TARTRATE 5 MG/1
5 TABLET ORAL NIGHTLY PRN
Status: DISCONTINUED | OUTPATIENT
Start: 2020-03-07 | End: 2020-03-09 | Stop reason: HOSPADM

## 2020-03-07 RX ORDER — ONDANSETRON 2 MG/ML
4 INJECTION INTRAMUSCULAR; INTRAVENOUS EVERY 6 HOURS PRN
Status: DISCONTINUED | OUTPATIENT
Start: 2020-03-07 | End: 2020-03-09 | Stop reason: HOSPADM

## 2020-03-07 RX ORDER — POLYETHYLENE GLYCOL 3350 17 G
2 POWDER IN PACKET (EA) ORAL
Status: DISCONTINUED | OUTPATIENT
Start: 2020-03-07 | End: 2020-03-09 | Stop reason: HOSPADM

## 2020-03-07 RX ORDER — FLECAINIDE ACETATE 100 MG/1
50 TABLET ORAL EVERY 12 HOURS SCHEDULED
Status: DISCONTINUED | OUTPATIENT
Start: 2020-03-07 | End: 2020-03-09 | Stop reason: HOSPADM

## 2020-03-07 RX ORDER — SODIUM CHLORIDE 0.9 % (FLUSH) 0.9 %
10 SYRINGE (ML) INJECTION EVERY 12 HOURS SCHEDULED
Status: DISCONTINUED | OUTPATIENT
Start: 2020-03-07 | End: 2020-03-09 | Stop reason: HOSPADM

## 2020-03-07 RX ORDER — DILTIAZEM HYDROCHLORIDE 180 MG/1
180 CAPSULE, COATED, EXTENDED RELEASE ORAL DAILY
Status: DISCONTINUED | OUTPATIENT
Start: 2020-03-07 | End: 2020-03-09 | Stop reason: HOSPADM

## 2020-03-07 RX ADMIN — SODIUM CHLORIDE, PRESERVATIVE FREE 10 ML: 5 INJECTION INTRAVENOUS at 08:23

## 2020-03-07 RX ADMIN — DILTIAZEM HYDROCHLORIDE 180 MG: 180 CAPSULE, COATED, EXTENDED RELEASE ORAL at 10:38

## 2020-03-07 RX ADMIN — SERTRALINE HYDROCHLORIDE 50 MG: 50 TABLET ORAL at 10:38

## 2020-03-07 RX ADMIN — Medication 20 MG: at 20:41

## 2020-03-07 RX ADMIN — FLECAINIDE ACETATE 50 MG: 100 TABLET ORAL at 20:41

## 2020-03-07 RX ADMIN — FLECAINIDE ACETATE 50 MG: 100 TABLET ORAL at 10:38

## 2020-03-07 RX ADMIN — SODIUM CHLORIDE, PRESERVATIVE FREE 10 ML: 5 INJECTION INTRAVENOUS at 20:41

## 2020-03-07 ASSESSMENT — PAIN DESCRIPTION - LOCATION
LOCATION: HEAD
LOCATION: ABDOMEN

## 2020-03-07 ASSESSMENT — PAIN DESCRIPTION - DESCRIPTORS
DESCRIPTORS: ACHING
DESCRIPTORS: OTHER (COMMENT)

## 2020-03-07 ASSESSMENT — PAIN DESCRIPTION - PAIN TYPE
TYPE: ACUTE PAIN
TYPE: ACUTE PAIN

## 2020-03-07 ASSESSMENT — PAIN - FUNCTIONAL ASSESSMENT: PAIN_FUNCTIONAL_ASSESSMENT: ACTIVITIES ARE NOT PREVENTED

## 2020-03-07 ASSESSMENT — PAIN SCALES - GENERAL
PAINLEVEL_OUTOF10: 0
PAINLEVEL_OUTOF10: 0
PAINLEVEL_OUTOF10: 4
PAINLEVEL_OUTOF10: 3

## 2020-03-07 ASSESSMENT — PAIN DESCRIPTION - FREQUENCY
FREQUENCY: CONTINUOUS
FREQUENCY: CONTINUOUS

## 2020-03-07 ASSESSMENT — PAIN DESCRIPTION - PROGRESSION: CLINICAL_PROGRESSION: NOT CHANGED

## 2020-03-07 NOTE — ED PROVIDER NOTES
within normal limits    Narrative:     Performed at:  [facility]  [address] [facility] [address]   Phone [phone]   SALICYLATE LEVEL - Abnormal; Notable for the following components:    Salicylate, Serum <4.3 (*)     All other components within normal limits    Narrative:     Performed at:  [facility]  [address] [facility] [address]   Phone [phone]   CBC WITH AUTO DIFFERENTIAL - Abnormal; Notable for the following components:    WBC 13.8 (*)     RDW 12.3 (*)     Neutrophils Absolute 8.7 (*)     All other components within normal limits    Narrative:     Performed at:  [facility]  [address] [facility] [address]   Phone [phone]   COMPREHENSIVE METABOLIC PANEL - Abnormal; Notable for the following components:    Sodium 132 (*)     Chloride 94 (*)     CO2 20 (*)     Anion Gap 18 (*)     Glucose 149 (*)     CREATININE 0.8 (*)     ALT 92 (*)     AST 66 (*)     All other components within normal limits    Narrative:     Performed at:  [place] [id number] [facility]   Phone [phone]   URINE DRUG SCREEN    Narrative:     Performed at:  [address] [place], [place] [address] [facility]   Phone [phone]   LIPASE    Narrative:     Performed at:  [address] [place], [place] [address] [address] [facility]   Phone [phone]   ETHANOL    Narrative:     Performed at:  [address] [place] [address]  [address] [facility]   Phone [phone]   MICROSCOPIC URINALYSIS    Narrative:     Performed at:  [address] [place] [address]  [address] [facility]   Phone [phone]   ETHANOL    Narrative:     Performed at:  [facility]  [address] [facility]   Phone [phone]     RADIOLOGY/PROCEDURES  I personally reviewed the images for this case. No orders to display       [address] [facility]  Pertinent Labs, EKG reviewed. (See chart for details)    Vitals:    03/06/20 2258 03/06/20 2300 03/07/20 0251 03/07/20 0326   BP:  119/72     Pulse:  82 95 79   Resp:  18 18 15   Temp:       TempSrc:       SpO2:  95% 96% 96%   Weight: 271 lb 9.7 oz (123.2 kg)          Medications   0.9 % sodium chloride bolus (1,000 mLs Intravenous New Bag 3/6/20 2221)       New Prescriptions    No medications on file       SEP-1 CORE MEASURE DATA  Exclusion criteria: the patient is NOT to be included for sepsis due to: Infection is not suspected    Patient remained stable in the ED. patient remained stable in the emergency department. He had no vomiting. He was sleeping quietly. He did not appear to be in any abdominal pain. There is no gastrointestinal bleeding or other bleeding noted. He remained stable. His abdomen was nontender. Patient was given IV fluids in the emergency department. Patient was admitted to the intensive care unit for further evaluation and treatment. The patient's blood pressure was not found to be elevated according to CMS/Medicare and the Affordable Care Act/ObamaCare criteria. See discharge instructions for specific medications, discharge information, and treatments. They were verbally instructed to return to emergency if any problems. (This chart has been completed using [address]. Although attempts have been made to ensure accuracy, words and/or phrases may not be transcribed as intended.)    Patient refused pain medicines at the time of his exam.    IMPRESSION(S):  1. Suicide attempt ([address]    2. Intentional ibuprofen overdose, initial encounter ([facility] [address]    3. Depression with suicidal ideation    4.  Acute alcoholic intoxication without complication

## 2020-03-07 NOTE — H&P
gain, recent wt loss, insomnia,  General weakness, poor appetite, muscle aches and pains    HEAD: headache, dizziness    EYES:      blurriness,  double vision, dryness,  discharge, irritation,diplopia    EARS:      hearing loss, vertigo, ear discharge,  Earache. Ringing in the ears. NOSE:      Rhinorrhea, sneezing, epistaxis. Discharge, sinusitis,     MOUTH/THROAT:         sore throat, mouth ulcers, Hoarseness    RESPIRATORY:        Shortness of breath, wheezing,  cough, sputum, hemoptysis, obstructive sleep apnea,    CARDIOVASCULAR :      chest pain, palpitations, dyspnea on exercise, Lower extrimity edema (swelling),     GASTROINTESTINAL:       Dysphagia, Poor appetite,  Nausea, Vomiting, diarrhea, heartburn, abdominal pain. Blood in the stools, hematemesis. Pain with swallowing, constipation    GENITOURINARY:       Urinary frequency, hesitancy,  urgency, Dysuria, hematuria,  Urinary Incontinence. Urinary Retention. GYNECOLOGICAL: vaginal bleeding , vaginal discharge, menopause    MUSCULOSKELETAL:       joint swelling or stiffness, joint pain, muscle pain, balance problems, low back pain. NEUROLOGICAL:      Gait problems. Tremor. Dizziness. Pain and paresthesias, weakness in extremities. Seizures, memory loss    PSYCHLOGICAL:        Anxiety, depression    SKIN :      Rashes ulcers, skin color changes, easy bruisability, lymphadenopathy      Physical Exam:      Vitals: /79   Pulse 73   Temp 97.9 °F (36.6 °C) (Oral)   Resp 16   Ht 6' 2\" (1.88 m)   Wt 259 lb 4.2 oz (117.6 kg)   SpO2 98%   BMI 33.29 kg/m²     Gen:          Alert and oriented x 3  Eyes: PERRL. No sclera icterus. No conjunctival injection. ENT: No discharge. Pharynx clear. External appearance of ears and nose normal.  Neck: Trachea midline. No obvious mass. Resp: No accessory muscle use. No crackles. No wheezes. No rhonchi. CV: Regular rate. Regular rhythm. No murmur or rub. No edema. GI: Non-tender. Non-distended. No hernia. Skin: Warm, dry, normal texture and turgor. Lymph: No cervical LAD. No supraclavicular LAD. M/S: / Ext. No cyanosis. No clubbing. No joint deformity. Neuro: Moves all four extremities. CN 2-12 tested, no deficits noted. Peripheral pulses and capillary refill is intact. CBC:   Recent Labs     03/06/20 2218 03/07/20  0050   WBC 14.8* 13.8*   HGB 14.9 14.3    240     BMP:    Recent Labs     03/06/20 2218 03/07/20  0050   * 132*   K 4.1 3.9   CL 92* 94*   CO2 19* 20*   BUN 10 10   CREATININE 0.7* 0.8*   GLUCOSE 137* 149*     Hepatic:   Recent Labs     03/06/20 2218 03/07/20  0050   AST 76* 66*   * 92*   BILITOT 0.5 0.3   ALKPHOS 92 86     Troponin: No results for input(s): TROPONINI in the last 72 hours. BNP: No results for input(s): BNP in the last 72 hours. INR: No results for input(s): INR in the last 72 hours. Lab Results   Component Value Date    LABA1C 5.5 10/19/2017           No results for input(s): CKTOTAL in the last 72 hours. -----------------------------------------------------------------    EKG  Normal sinus rhythm nonspecific ST-T wave abnormalities      Assessment / Plan     Ibuprofen overdose  Continue to monitor        Major depressive disorder  Suicidal ideation  Psychiatry consult is appreciated  Patient is started on sertraline 50 mg by mouth daily    Alcohol abuse  Monitor for withdrawal symptoms    Tobacco abuse Z72.0  Nicotine lozenges as needed      Patient is medically stable to be transferred  If patient needs to be transferred to the psych unit      DVT and GI prophylaxis      Full Lyric Chavez M.D    This note was transcribed using [id number] [facility]. Please disregard any translational errors.

## 2020-03-07 NOTE — ED TRIAGE NOTES
Reports he was trying to kill himself this evening taking 200 to 300 tabs of ibuprofen 200 mg  With 2 large cans of bud light exam per md

## 2020-03-07 NOTE — ED PROVIDER NOTES
Emergency Department Provider Note  Location: [facility]  3/6/2020     Patient Identification  Alicia Shore is a 37 y.o. male    Chief Complaint  Suicidal (possibly took 200 to 300 tabs of ibuprofen at 830 pm today )      Mode of Arrival  private car    HPI  (History provided by patient)  This is a 37 y.o. male with a PMH significant for paroxysmal a-fib presented today for suicide attempt. Patient states he has been feeling depressed for months. He is currently not receiving any treatment for depression. He is not seeing a psychiatrist.  He states he has been under a lot of stress. Tonight, he drank 2 large beer and took [place] lot of ibuprofen. \" Wife, who is a nurse at [employer], showed up later and clarified the the time of ingestion should be between 7:30pm to 8:15pm because she got a text around 8:15pm and patient said he just took it. Wife said the bottle is 200mg ibuprofen and she estimated about 200 tablets left (out of a bottle of 500 tablets). Here, patient has no complaint other than feeling depressed. He denies nausea vomiting or abdominal pain. ROS  Review of Systems   Constitutional: Negative for fever. Eyes: Negative for discharge. Respiratory: Negative for shortness of breath. Cardiovascular: Negative for chest pain and palpitations. Gastrointestinal: Negative for abdominal pain, nausea and vomiting. Genitourinary: Negative for dysuria and frequency. Musculoskeletal: Negative for gait problem. Skin: Negative for rash. Neurological: Negative for syncope and speech difficulty. Psychiatric/Behavioral: Positive for dysphoric mood, self-injury and suicidal ideas. I have reviewed the following nursing documentation:  Allergies: Allergies   Allergen Reactions    Nickel Rash     Pt develops rash when nickel buckle comes in contact with skin. Past medical history:  has a past medical history of Paroxysmal atrial fibrillation ([facility] [address]     Past surgical history:  has a past surgical history that includes Endoscopy, colon, diagnostic; Mouth surgery; and Knee arthroscopy (Left). Home medications:   Prior to Admission medications    Medication Sig Start Date End Date Taking? Authorizing Provider   flecainide (TAMBOCOR) 50 MG tablet Take 50 mg by mouth 10/31/19  Yes Historical Provider, MD   lisinopril (PRINIVIL;ZESTRIL) 20 MG tablet Take 20 mg by mouth 6/30/19 6/24/20 Yes Historical Provider, MD   aspirin 81 MG tablet Take 81 mg by mouth daily   Yes Historical Provider, MD       Social history:  reports that he has never smoked. His smokeless tobacco use includes snuff. He reports current alcohol use of about 8.0 - 12.0 standard drinks of alcohol per week. He reports that he does not use drugs. Family history:    Family History   Problem Relation Age of Onset    High Blood Pressure Father     Heart Disease Father     Stroke Father     Cancer Maternal Grandmother     Cancer Maternal Grandfather     Cancer Paternal Grandmother         lung       Exam  ED Triage Vitals   BP Temp Temp Source Pulse Resp SpO2 Height Weight   03/06/20 2156 03/06/20 2151 03/06/20 2156 03/06/20 2156 03/06/20 2156 03/06/20 2156 -- --   (!) 147/91 97.2 °F (36.2 °C) Oral 98 18 98 %     Physical Exam  Vitals signs and nursing note reviewed. Constitutional:       General: He is not in acute distress. Appearance: He is well-developed. He is not diaphoretic. Comments: Smell of alcohol but answering all questions appropriately   HENT:      Head: Normocephalic and atraumatic. Eyes:      General: No scleral icterus. Right eye: No discharge. Left eye: No discharge. Conjunctiva/sclera:      Right eye: Right conjunctiva is injected. Left eye: Left conjunctiva is injected. Neck:      Musculoskeletal: Neck supple. Trachea: No tracheal deviation. Cardiovascular:      Rate and Rhythm: Normal rate and regular rhythm.       Heart sounds: Normal are sent out labs (we do not have it at [facility]), [facility] recommended transfer to a facility that can run these in a more timely manner. We discussed direct admission instead of ED-to-ED transfer and [facility] recommended ED workup and observation first before we consider admission.   - I initially ordered tylenol and ASA level but we had to wait for  and we were quoted minimum 90-minute wait. I cancelled the order since [facility] recommended transfer. - Patient was placed on telemetry during his/her ED stay and no malignant dysrhythmia observed. - Pertinent old records reviewed. No prior history of renal failure. Old labs reviewed for comparison purpose. - Patient was given 1L NS bolus in the ED.    - CMP showed elevated AG. I added on VBG, which did not result prior to transfer. - urine drug screen al  - I spoke with Dr. Vu Brewster at [facility]. We discussed the history, exam, and my discussion with [facility]. He accepted the transfer. Clinical Impression:  1. Suicide attempt ([address]    2. Intentional ibuprofen overdose, initial encounter [facility])        Disposition:  Transfer to [facility] ED in stable condition. Blood pressure 112/73, pulse 86, temperature 97.2 °F (36.2 °C), resp. rate 15, SpO2 94 %. This chart was generated in part by using Dragon Dictation system and may contain errors related to that system including errors in grammar, punctuation, and spelling, as well as words and phrases that may be inappropriate. If there are any questions or concerns please feel free to contact the dictating provider for clarification.      Nelson Gore MD  [address] [place] [address] Jessica Burt MD  03/07/20 1642

## 2020-03-07 NOTE — PROGRESS NOTES
Patient alert and oriented presently. Patient denies any pain. Wife and sitter at bedside. Patient appears in no evident distress and expresses no concerns presently. Will continue to monitor.  Electronically signed by Alfredito Thomas RN on 3/7/2020 at 8:30 AM

## 2020-03-07 NOTE — ED NOTES
poison control suggests cmp repeat. She states that if repeat labs are normal that we may be able to medically clear pt.  Demetrius Peterson MD informed     Rosanna Ng RN  03/07/20 Lito Horowitz RN  03/07/20 9561

## 2020-03-07 NOTE — PROGRESS NOTES
Pt admitted t0 4261 via stretcher accompanied by spouse. Pt is A&O x 4, c/o abdominal pain rating 3/10 described as \"hunger pains\". Pt ambulated from stretcher to bed with an unsteady gait. Pt has a holter monitor on and a BP cuff per PCP. Pt states BP cuff can be removed at 326pm. Staff sitter at bedside. Pt resting in bed.  Electronically signed by Danielle Scott RN on 3/7/2020 at 6:58 AM

## 2020-03-08 VITALS
SYSTOLIC BLOOD PRESSURE: 182 MMHG | BODY MASS INDEX: 34.32 KG/M2 | HEIGHT: 74 IN | WEIGHT: 267.42 LBS | HEART RATE: 72 BPM | OXYGEN SATURATION: 97 % | TEMPERATURE: 97.6 F | RESPIRATION RATE: 16 BRPM | DIASTOLIC BLOOD PRESSURE: 79 MMHG

## 2020-03-08 PROBLEM — F32.9 MDD (MAJOR DEPRESSIVE DISORDER), SINGLE EPISODE: Status: ACTIVE | Noted: 2020-03-08

## 2020-03-08 LAB
A/G RATIO: 1.2 (ref 1.1–2.2)
ALBUMIN SERPL-MCNC: 3.7 G/DL (ref 3.4–5)
ALP BLD-CCNC: 79 U/L (ref 40–129)
ALT SERPL-CCNC: 68 U/L (ref 10–40)
ANION GAP SERPL CALCULATED.3IONS-SCNC: 13 MMOL/L (ref 3–16)
AST SERPL-CCNC: 44 U/L (ref 15–37)
BILIRUB SERPL-MCNC: 0.5 MG/DL (ref 0–1)
BUN BLDV-MCNC: 12 MG/DL (ref 7–20)
CALCIUM SERPL-MCNC: 8.9 MG/DL (ref 8.3–10.6)
CHLORIDE BLD-SCNC: 103 MMOL/L (ref 99–110)
CO2: 24 MMOL/L (ref 21–32)
CREAT SERPL-MCNC: 0.9 MG/DL (ref 0.9–1.3)
GFR AFRICAN AMERICAN: >60
GFR NON-AFRICAN AMERICAN: >60
GLOBULIN: 3.2 G/DL
GLUCOSE BLD-MCNC: 149 MG/DL (ref 70–99)
HCT VFR BLD CALC: 41.6 % (ref 40.5–52.5)
HEMOGLOBIN: 14.4 G/DL (ref 13.5–17.5)
MCH RBC QN AUTO: 32.7 PG (ref 26–34)
MCHC RBC AUTO-ENTMCNC: 34.5 G/DL (ref 31–36)
MCV RBC AUTO: 94.8 FL (ref 80–100)
PDW BLD-RTO: 12.6 % (ref 12.4–15.4)
PLATELET # BLD: 215 K/UL (ref 135–450)
PMV BLD AUTO: 8.5 FL (ref 5–10.5)
POTASSIUM SERPL-SCNC: 4.5 MMOL/L (ref 3.5–5.1)
RBC # BLD: 4.39 M/UL (ref 4.2–5.9)
SODIUM BLD-SCNC: 140 MMOL/L (ref 136–145)
TOTAL PROTEIN: 6.9 G/DL (ref 6.4–8.2)
WBC # BLD: 10.6 K/UL (ref 4–11)

## 2020-03-08 PROCEDURE — 2500000003 HC RX 250 WO HCPCS: Performed by: HOSPITALIST

## 2020-03-08 PROCEDURE — 94760 N-INVAS EAR/PLS OXIMETRY 1: CPT

## 2020-03-08 PROCEDURE — 96376 TX/PRO/DX INJ SAME DRUG ADON: CPT

## 2020-03-08 PROCEDURE — 2580000003 HC RX 258: Performed by: INTERNAL MEDICINE

## 2020-03-08 PROCEDURE — G0378 HOSPITAL OBSERVATION PER HR: HCPCS

## 2020-03-08 PROCEDURE — 6370000000 HC RX 637 (ALT 250 FOR IP): Performed by: PSYCHIATRY & NEUROLOGY

## 2020-03-08 PROCEDURE — 85027 COMPLETE CBC AUTOMATED: CPT

## 2020-03-08 PROCEDURE — 80053 COMPREHEN METABOLIC PANEL: CPT

## 2020-03-08 PROCEDURE — 36415 COLL VENOUS BLD VENIPUNCTURE: CPT

## 2020-03-08 PROCEDURE — 6370000000 HC RX 637 (ALT 250 FOR IP): Performed by: HOSPITALIST

## 2020-03-08 RX ORDER — LISINOPRIL 20 MG/1
20 TABLET ORAL DAILY
Status: DISCONTINUED | OUTPATIENT
Start: 2020-03-08 | End: 2020-03-09 | Stop reason: HOSPADM

## 2020-03-08 RX ORDER — LORAZEPAM 2 MG/ML
2 INJECTION INTRAMUSCULAR
Status: DISCONTINUED | OUTPATIENT
Start: 2020-03-08 | End: 2020-03-08 | Stop reason: HOSPADM

## 2020-03-08 RX ADMIN — SODIUM CHLORIDE, PRESERVATIVE FREE 10 ML: 5 INJECTION INTRAVENOUS at 21:00

## 2020-03-08 RX ADMIN — FLECAINIDE ACETATE 50 MG: 100 TABLET ORAL at 08:33

## 2020-03-08 RX ADMIN — DILTIAZEM HYDROCHLORIDE 180 MG: 180 CAPSULE, COATED, EXTENDED RELEASE ORAL at 08:33

## 2020-03-08 RX ADMIN — LISINOPRIL 20 MG: 20 TABLET ORAL at 15:07

## 2020-03-08 RX ADMIN — Medication 20 MG: at 08:34

## 2020-03-08 RX ADMIN — SERTRALINE HYDROCHLORIDE 50 MG: 50 TABLET ORAL at 08:34

## 2020-03-08 RX ADMIN — SODIUM CHLORIDE, PRESERVATIVE FREE 10 ML: 5 INJECTION INTRAVENOUS at 08:35

## 2020-03-08 RX ADMIN — FLECAINIDE ACETATE 50 MG: 100 TABLET ORAL at 21:00

## 2020-03-08 RX ADMIN — Medication 20 MG: at 21:00

## 2020-03-08 RX ADMIN — PANTOPRAZOLE SODIUM 40 MG: 40 TABLET, DELAYED RELEASE ORAL at 08:44

## 2020-03-08 ASSESSMENT — PAIN SCALES - GENERAL: PAINLEVEL_OUTOF10: 0

## 2020-03-08 NOTE — PLAN OF CARE
Problem: Pain:  Goal: Pain level will decrease  Description: Pain level will decrease  3/7/2020 2142 by Aidan Mares RN  Outcome: Ongoing  3/7/2020 0832 by Maury Bernstein RN  Outcome: Ongoing     Problem: Pain:  Goal: Control of acute pain  Description: Control of acute pain  3/7/2020 2142 by Aidan Mares RN  Outcome: Ongoing  3/7/2020 0832 by Maury Bernstein RN  Outcome: Ongoing    Pain/discomfort being managed with PRN analgesics per MD orders. Pt able to express presence and absence of pain and rate pain appropriately using numerical scale. Non-pharmacologic comfort measures implemented. Rest and comfort promoted. Problem: Falls - Risk of:  Goal: Will remain free from falls  Description: Will remain free from falls  Outcome: Ongoing     Problem: Falls - Risk of:  Goal: Absence of physical injury  Description: Absence of physical injury  Outcome: Ongoing    Patient uses call light appropriately to express needs. Bed to lowest position with door open and call light in reach. All fall precautions implemented at this time. Siderails up x2. Non skid footwear in place. Sitter in place. Seen at intervals to ensure safety. All needs attended.

## 2020-03-08 NOTE — PROGRESS NOTES
Hospitalist Progress Note  3/8/2020 10:28 AM    PCP: Shell Chapin MD    [phone]     Date of Admission: 3/6/2020                                                                                                                     HOSPITAL COURSE    Patient demographics:  The patient  Abby Mcrae is a 37 y.o. male     Significant past medical history:   Patient Active Problem List   Diagnosis    Mixed hyperlipidemia    Right upper quadrant abdominal pain    Fatty liver    Septic prepatellar bursitis of left knee    Prepatellar bursitis of left knee    Suicidal overdose, initial encounter ([place] [address].)    Major depressive disorder, single episode, [facility] ([facility]    Alcohol use disorder, mild, abuse    Intentional ibuprofen overdose ([facility]         Presenting symptoms:  Suicidal ideation    Diagnostic workup:      CONSULTS DURING ADMISSION :   IP CONSULT TO PSYCHIATRY      Patient was diagnosed with:        Treatment while inpatient:  Patient presented to the emergency room and reported that he is depressed and he took  Large number of ibuprofen tablets.                                                                                       ----------------------------------------------------------      SUBJECTIVE COMPLAINTS-  Follow up for Suicidal ideation    Diet: DIET GENERAL; Safety Tray; Safety Tray (Disposables)      OBJECTIVE:   Patient Active Problem List   Diagnosis    Mixed hyperlipidemia    Right upper quadrant abdominal pain    Fatty liver    Septic prepatellar bursitis of left knee    Prepatellar bursitis of left knee    Suicidal overdose, initial encounter ([place] [address].)    Major depressive disorder, single episode, severe ([facility]    Alcohol use disorder, mild, abuse    Intentional ibuprofen overdose ([facility])       Allergies  Nickel    Medications    Scheduled Meds:   sodium chloride flush  10 mL Intravenous 2 times per day    enoxaparin  40 mg Subcutaneous Daily    sertraline  50 mg Oral Daily    flecainide  50 mg Oral 2 times per day    dilTIAZem  180 mg Oral Daily    pantoprazole  40 mg Oral QAM AC    famotidine (PEPCID) injection  20 mg Intravenous BID     Continuous Infusions:  PRN Meds:  sodium chloride flush, acetaminophen **OR** acetaminophen, polyethylene glycol, promethazine **OR** ondansetron, nicotine polacrilex, zolpidem    Vitals   Vitals /wt   Patient Vitals for the past 8 hrs:   BP Temp Temp src Pulse Resp SpO2 Weight   03/08/20 0744 (!) 147/88 98 °F (36.7 °C) Oral 64 16 97 % --   03/08/20 0615 (!) 155/87 98.2 °F (36.8 °C) Oral 96 18 -- 267 lb 6.7 oz (121.3 kg)        72HR INTAKE/OUTPUT:      Intake/Output Summary (Last 24 hours) at 3/8/2020 1028  Last data filed at 3/8/2020 6928  Gross per 24 hour   Intake 320 ml   Output --   Net 320 ml       Exam:    Gen:   Alert and oriented ×3   Eyes: PERRL. No sclera icterus. No conjunctival injection. ENT: No discharge. Pharynx clear. External appearance of ears and nose normal.  Neck: Trachea midline. No obvious mass. Resp: No accessory muscle use. No crackles. No wheezes. No rhonchi. CV: Regular rate. Regular rhythm. No murmur or rub. No edema. GI: Non-tender. Non-distended. No hernia. Skin: Warm, dry, normal texture and turgor. Lymph: No cervical LAD. No supraclavicular LAD. M/S: / Ext. No cyanosis. No clubbing. No joint deformity. Neuro: CN 2-12 are intact,  no neurologic deficits noted. PT/INR: No results for input(s): PROTIME, INR in the last 72 hours. APTT: No results for input(s): APTT in the last 72 hours.     CBC:   Recent Labs     03/06/20 2218 03/07/20  0050   WBC 14.8* 13.8*   HGB 14.9 14.3   HCT 44.3 41.1   MCV 93.3 93.1    240       BMP:   Recent Labs     03/06/20 2218 03/07/20  0050   * 132*   K 4.1 3.9   CL 92* 94*   CO2 19* 20*   BUN 10 10   CREATININE 0.7* 0.8*       LIVER PROFILE:   Recent Labs     03/06/20 2218 03/07/20  0050   ALKPHOS 92 86   AST 76* 66*   * 92*   BILIDIR <0.2  --

## 2020-03-08 NOTE — CARE COORDINATION
Patient has been accepted to [facility] for psych transfer. Paperwork for 72 hour hold needs to be faxed to [phone] before transport can be set up.     Electronically signed by Sydney Killian RN on 3/8/2020 at 2:30 PM

## 2020-03-08 NOTE — BH NOTE
PT IS IN BED SITTING UP WITH WIFE AT BED SIDE IM TRYING TO GET INFORMATION ON CHARTING EVERY 15 MIN.  PT IS CALM AN IS WATCHING TV. [Fair] : ~his/her~ current health as fair  [Very Good] : ~his/her~  mood as very good [No falls in past year] : Patient reported no falls in the past year [0] : 2) Feeling down, depressed, or hopeless: Not at all (0) [Patient reported mammogram was normal] : Patient reported mammogram was normal [Patient reported PAP Smear was normal] : Patient reported PAP Smear was normal [Patient reported bone density results were normal] : Patient reported bone density results were normal [Patient reported colonoscopy was normal] : Patient reported colonoscopy was normal [HIV test declined] : HIV test declined [Hepatitis C test declined] : Hepatitis C test declined [None] : None [With Family] : lives with family [Retired] : retired [Fully functional (bathing, dressing, toileting, transferring, walking, feeding)] : Fully functional (bathing, dressing, toileting, transferring, walking, feeding) [Fully functional (using the telephone, shopping, preparing meals, housekeeping, doing laundry, using] : Fully functional and needs no help or supervision to perform IADLs (using the telephone, shopping, preparing meals, housekeeping, doing laundry, using transportation, managing medications and managing finances) [Smoke Detector] : smoke detector [Carbon Monoxide Detector] : carbon monoxide detector [Seat Belt] :  uses seat belt [Discussed at today's visit] : Advance Directives Discussed at today's visit [Designated Healthcare Proxy] : Designated healthcare proxy [Name: ___] : Health Care Proxy's Name: [unfilled]  [Relationship: ___] : Relationship: [unfilled] [Aggressive treatment] : aggressive treatment [] : No [de-identified] : Gastro [[id number]] : 0 [Change in mental status noted] : No change in mental status noted [Language] : denies difficulty with language [Behavior] : denies difficulty with behavior [Learning/Retaining New Information] : denies difficulty learning/retaining new information [Handling Complex Tasks] : denies difficulty handling complex tasks [Reasoning] : denies difficulty with reasoning [Spatial Ability and Orientation] : denies difficulty with spatial ability and orientation [Reports changes in hearing] : Reports no changes in hearing [Reports changes in vision] : Reports no changes in vision [Reports changes in dental health] : Reports no changes in dental health [MammogramDate] : 06/16 [PapSmearDate] : >5 yrs ago [BoneDensityDate] : >5 yrs ago [ColonoscopyDate] : 10/15 [de-identified] : lives w/ son and DIL

## 2020-03-08 NOTE — PROGRESS NOTES
Wife came out to nursing station and requested patient to be transferred to [facility]; she states his atrial fibrillation is not under control and he can be admitted AFTER he goes to [facility].  This RN informed patient's wife that [facility] hold had to be followed and patient was medically cleared by Dr. Catie Segura. Patient's wife states [facility] is not going there. \"  Charge RN notified and supervisor made aware.  Electronically signed by Blanka Ruiz RN on 3/8/2020 at 5:00 PM

## 2020-03-08 NOTE — CARE COORDINATION
LSW met with patient and spouse at bedside to inform of inpatient psych transfer. Patient and spouse express great frustration stating that they were told by psychiatry that patient would be re-evaluated here prior to being transferred to an inpatient facility. Bedside RN attempting to contact psychiatry to confirm. LSW per chart review that psychiatry recommended inpatient psych admission when medically stable. LSW following .    Electronically signed by Charlie Browne on 3/8/2020 at 3:38 PM

## 2020-03-08 NOTE — PROGRESS NOTES
Per RNs Jose Aviles and Lizzy Guzman Pt's wife approached them at nurse's station raising her voice and insisting that Pt be transferred to [facility] and refusing to transfer to [facility] as planned. RN Jada asked this Charge RN to speak with Pt and wife. Upon entering room Pt and wife at bedside are extremely agitated. They state that they were told by Dr. Johnny Greer that Pt would be reevaluated by him today and Pt would possibly be discharged. Explained that Dr. Johnny Greer did not state that in his note. Pt is currently on a 72 hour Psych hold and would only be transferred to an inpatient Psych unit. Pt's wife becomes angered by this and states that his Afib is not being treated properly. Explained that his Afib is stable, is not his admitting diagnosis and he has been cleared medically. Pt and wife are also under the impression that his 67 hour hold will end tomorrow. Explained that the 72 hours is suspended until he is admitted to inpatient psych. Pt continues to refuse to be transferred to [facility] until he is reevaluated by Psych MD. Paloma Greer aware. MD states that Dr. Pool Greer will see Pt tomorrow. Both Dr. Dionne Mcneil and Dr. Johnny Greer agree to allow Pt to stay as inpatient overnight. Will continue to monitor.

## 2020-03-08 NOTE — PROGRESS NOTES
Bhavani Alvarenga RN to assume care for patient.  Electronically signed by Reva Shanks RN on 3/8/2020 at 5:11 PM

## 2020-03-08 NOTE — PROGRESS NOTES
[id number] called for psych placment; Patient and wife informed. Pt and wife state Marshall Bhatia told them yesterday he would reevaluate patient again today to determine if psych placement is needed. This RN and Zuleima Kin called three numbers to reach Dr. Wai Ivory with no avail. Psych hotline called and voice message left. Patient very irritable regarding this news and is adamantly wanting to see psychiatrist before \"deciding\" to leave. Dr. Adilson Becker informed and could either stay until tomorrow  To speak with psychiatrist or he would  Have to be taken to [facility] by force. FELIX, Charge RN, [facility], and hospitalist Adilson Becker aware. Will continue to monitor.  Electronically signed by Juan Jose Mcadams RN on 3/8/2020 at 4:40 PM

## 2020-03-09 NOTE — PROGRESS NOTES
Spoke with Dr. Marylin Reyes via telephone. Stated he spoke with family and patient separately and feels patient no longer needs 72 hour hold and transferred to [facility], that patient is no longer suicidal and feels he has adequate support. Stated patient is ok to discharge home this evening, call out to hospitalist for ok to discharge medically as well.

## 2020-03-09 NOTE — PROGRESS NOTES
Discharge paperworks reviewed with patient and patients wife. All questions answered. [address] addiction treatment information given to patient per Dr. Cyndi Costello request. IV cannula removed. Telemetry removed. All belongings given. Discharged home with wife.  Electronically signed by Apurva Parrish RN on 3/8/2020 at 10:15 PM

## 2020-03-09 NOTE — DISCHARGE SUMMARY
Hospital Medicine Discharge Summary      Patient ID: Libby Ramires , [id number]     Patient's PCP: Tana Portillo MD    Admit Date: 3/6/2020     Discharge Date: 3/8/2020      Admitting Physician: Janes Banda MD    Discharge Physician: Zack Wells MD     Discharge Diagnoses: Active Hospital Problems    Diagnosis Date Noted    Suicidal overdose, initial encounter [facility]) Sarah Godinez 03/07/2020    Major depressive disorder, single episode, severe ([facility]) [F32.2]     Alcohol use disorder, mild, abuse [F10.10]     Intentional ibuprofen overdose ([facility] [address] Gwyneth Gilford          The patient was seen and examined on the day of discharge and this discharge summary is in conjunction with any daily progress note from day of discharge. Hospital Course:             Patient demographics:  The patient  Libby Ramires is a 37 y.o. male      Significant past medical history:       Patient Active Problem List   Diagnosis    Mixed hyperlipidemia    Right upper quadrant abdominal pain    Fatty liver    Septic prepatellar bursitis of left knee    Prepatellar bursitis of left knee    Suicidal overdose, initial encounter ([facility] [address]    Major depressive disorder, single episode, severe ([address]    Alcohol use disorder, mild, abuse    Intentional ibuprofen overdose ([address]            Presenting symptoms:  Suicidal ideation     Diagnostic workup:        CONSULTS DURING ADMISSION :   IP CONSULT TO PSYCHIATRY        Patient was diagnosed with:   Ibuprofen overdose   Major depressive disorder   Alcohol abuse    Tobacco abuse    Treatment while inpatient:  Patient presented to the emergency room and reported that he is depressed and he took  Large number of ibuprofen tablets with suicidal intention. Patient was admitted to the hospital and psychiatric service was consulted.                       Patient was forced put on psychiatric hold and patient was started on Zoloft  Patient's mood improved significantly and it was decided by the psychiatrist  That patient does not need to be transferred to the psych unit. Patient's lab work remained within normal limits. Patient is being discharged home. Patient should follow-up with the psychiatrist in the next 1 month. Patient was also provided with [employer] phone number to get help with outpatient addiction treatment in relation to patient's alcohol abuse.        Discharge Condition:  stable      Discharged to:  Home      Activity:   as tolerated:     Follow Up: Follow-up with PCP in 1-2 weeks            Labs: For convenience and continuity at follow-up the following most recent labs are provided:      CBC:   Lab Results   Component Value Date    WBC 10.6 03/08/2020    HGB 14.4 03/08/2020    HCT 41.6 03/08/2020     03/08/2020       RENAL:   Lab Results   Component Value Date     03/08/2020    K 4.5 03/08/2020    K 4.1 03/06/2020     03/08/2020    CO2 24 03/08/2020    BUN 12 03/08/2020    CREATININE 0.9 03/08/2020           Discharge Medications:    Roney Averyer   [place] Medication Instructions [facility]:[id number]    Printed on:03/08/20 9637   Medication Information                      aspirin 81 MG tablet  Take 81 mg by mouth daily             diltiazem (CARDIZEM CD) 180 MG extended release capsule  Take 1 capsule by mouth daily             flecainide (TAMBOCOR) 50 MG tablet  Take 50 mg by mouth 2 times daily              lisinopril (PRINIVIL;ZESTRIL) 20 MG tablet  Take 20 mg by mouth daily              sertraline (ZOLOFT) 50 MG tablet  Take 1 tablet by mouth daily                    Time Spent on discharge is more than 30 min in the examination, evaluation, counseling and review of medications and discharge plan. Signed:  Saige Salgado MD   3/8/2020      Thank you Shell Chapin MD for the opportunity to be involved in this patient's care. If you have any questions or concerns please feel free to contact me at [phone].     This note was transcribed using

## 2020-03-17 ENCOUNTER — TELEPHONE (OUTPATIENT)
Dept: ORTHOPEDIC SURGERY | Age: 35
End: 2020-03-17

## 2020-03-17 ENCOUNTER — OFFICE VISIT (OUTPATIENT)
Dept: ORTHOPEDIC SURGERY | Age: 35
End: 2020-03-17
Payer: COMMERCIAL

## 2020-03-17 VITALS — HEIGHT: 74 IN | RESPIRATION RATE: 16 BRPM | BODY MASS INDEX: 34.27 KG/M2 | WEIGHT: 267 LBS

## 2020-03-17 PROCEDURE — 99214 OFFICE O/P EST MOD 30 MIN: CPT | Performed by: ORTHOPAEDIC SURGERY

## 2020-03-17 RX ORDER — MELOXICAM 7.5 MG/1
7.5 TABLET ORAL DAILY
Qty: 10 TABLET | Refills: 0 | Status: SHIPPED | OUTPATIENT
Start: 2020-03-17 | End: 2021-02-17 | Stop reason: ALTCHOICE

## 2020-03-17 NOTE — TELEPHONE ENCOUNTER
PATIENT IS CALLING FOR A PRESCRIPTION FILL FOR AN ANTI INFLAMMATORY. WOULD LIKE A CALL WHEN COMPLETE.  [place] [address] [phone]

## 2020-03-17 NOTE — PROGRESS NOTES
Not on file    Stress: Not on file   Relationships    Social connections     Talks on phone: Not on file     Gets together: Not on file     Attends [employer] service: Not on file     Active member of club or organization: Not on file     Attends meetings of clubs or organizations: Not on file     Relationship status: Not on file    Intimate partner violence     Fear of current or ex partner: Not on file     Emotionally abused: Not on file     Physically abused: Not on file     Forced sexual activity: Not on file   Other Topics Concern    Not on file   Social History Narrative    Not on file       Family History   Problem Relation Age of Onset    High Blood Pressure Father     Heart Disease Father     Stroke Father     Cancer Maternal Grandmother     Cancer Maternal Grandfather     Cancer Paternal Grandmother         lung       Current Outpatient Medications on File Prior to Visit   Medication Sig Dispense Refill    sertraline (ZOLOFT) 50 MG tablet Take 1 tablet by mouth daily 30 tablet 0    flecainide (TAMBOCOR) 50 MG tablet Take 50 mg by mouth 2 times daily       lisinopril (PRINIVIL;ZESTRIL) 20 MG tablet Take 20 mg by mouth daily       aspirin 81 MG tablet Take 81 mg by mouth daily      diltiazem (CARDIZEM CD) 180 MG extended release capsule Take 1 capsule by mouth daily 30 capsule 0     No current facility-administered medications on file prior to visit. Pertinent items are noted in HPI  Review of systems reviewed from Patient History Form dated on 3/17/2020 and available in the patient's chart under the Media tab. PHYSICAL EXAMINATION:  Mr. Princess Cee is a very pleasant 37 y.o.  male who presents today in no acute distress, awake, alert, and oriented. He is well dressed, nourished and  groomed. Patient with normal affect. Height is  6' 2\" (1.88 m), weight is 267 lb (121.1 kg), Body mass index is 34.28 kg/m². Resting respiratory rate is 16.      Examination of the gait, showed that the patient walks heel-toe with a non-antalgic gait and no limp.  Examination of both knees showing decreased  ROM, left knee with extension. He has tenderness on palpation over the prepatellar bursa with swelling compared to the other side. There is no warmth or erythema. No signs of infection. He is stable to varus and valgus stress. He has intact sensation and good pedal pulses. He has good strength in 2 planes. Knee reflex 1+ bilaterally. IMAGING:  Xray 3 views of the left knee was obtained today in the office and reviewed. These demonstrate no significant degenerative changes. No acute fracture dislocation. IMPRESSION: Left knee prepatellar bursitis. PLAN: I discussed with the patient the treatment options including both surgical and non-surgical treatment. We recommended Quad exercises and stretching of the calf and hamstrings which was taught to the patient today. He will take NSAIDS as needed. He was instructed to avoid kneeling on the left knee. He was instructed to rest, ice, use compression and elevate. F/u in 2 weeks. He understands that this may need surgery if the pain did not to resolve.      Saint Stakes, MD

## 2021-02-17 ENCOUNTER — OFFICE VISIT (OUTPATIENT)
Dept: FAMILY MEDICINE CLINIC | Age: 36
End: 2021-02-17
Payer: COMMERCIAL

## 2021-02-17 VITALS
BODY MASS INDEX: 33.42 KG/M2 | SYSTOLIC BLOOD PRESSURE: 114 MMHG | DIASTOLIC BLOOD PRESSURE: 82 MMHG | TEMPERATURE: 97 F | HEIGHT: 74 IN | WEIGHT: 260.4 LBS

## 2021-02-17 DIAGNOSIS — G62.9 NEUROPATHY: Primary | ICD-10-CM

## 2021-02-17 DIAGNOSIS — F10.10 ALCOHOL USE DISORDER, MILD, ABUSE: ICD-10-CM

## 2021-02-17 DIAGNOSIS — I10 ESSENTIAL HYPERTENSION: ICD-10-CM

## 2021-02-17 DIAGNOSIS — F32.2 MAJOR DEPRESSIVE DISORDER, SINGLE EPISODE, SEVERE (HCC): ICD-10-CM

## 2021-02-17 DIAGNOSIS — G62.9 NEUROPATHY: ICD-10-CM

## 2021-02-17 PROCEDURE — 99214 OFFICE O/P EST MOD 30 MIN: CPT | Performed by: INTERNAL MEDICINE

## 2021-02-17 SDOH — ECONOMIC STABILITY: FOOD INSECURITY: WITHIN THE PAST 12 MONTHS, YOU WORRIED THAT YOUR FOOD WOULD RUN OUT BEFORE YOU GOT MONEY TO BUY MORE.: NEVER TRUE

## 2021-02-17 SDOH — ECONOMIC STABILITY: TRANSPORTATION INSECURITY
IN THE PAST 12 MONTHS, HAS THE LACK OF TRANSPORTATION KEPT YOU FROM MEDICAL APPOINTMENTS OR FROM GETTING MEDICATIONS?: NO

## 2021-02-17 SDOH — ECONOMIC STABILITY: INCOME INSECURITY: HOW HARD IS IT FOR YOU TO PAY FOR THE VERY BASICS LIKE FOOD, HOUSING, MEDICAL CARE, AND HEATING?: NOT HARD AT ALL

## 2021-02-17 ASSESSMENT — ENCOUNTER SYMPTOMS
APNEA: 0
COUGH: 0
ABDOMINAL PAIN: 0
SHORTNESS OF BREATH: 0

## 2021-02-17 NOTE — PROGRESS NOTES
Joselyn Jose (:  1985) is a 38 y.o. male,Established patient, here for evaluation of the following chief complaint(s):  Numbness (patient c/o numbness- right and left foot \"for a long time\". patient also notes left ankle pain. patient denies injury )      ASSESSMENT/PLAN:   Diagnosis Orders   1. Neuropathy  Basic Metabolic Panel    TSH without Reflex    Glucose    Hepatic Function Panel    EMG   2. Major depressive disorder, single episode, severe ([address]     3. Alcohol use disorder, mild, abuse     4. Essential hypertension     numbness could be from etoh  Outpatient Encounter Medications as of 2021   Medication Sig Dispense Refill    flecainide (TAMBOCOR) 50 MG tablet Take 50 mg by mouth 2 times daily       lisinopril (PRINIVIL;ZESTRIL) 20 MG tablet Take 20 mg by mouth daily       aspirin 81 MG tablet Take 81 mg by mouth daily      diltiazem (CARDIZEM CD) 180 MG extended release capsule Take 1 capsule by mouth daily 30 capsule 0    [DISCONTINUED] meloxicam (MOBIC) 7.5 MG tablet Take 1 tablet by mouth daily for 10 doses 10 tablet 0    sertraline (ZOLOFT) 50 MG tablet Take 1 tablet by mouth daily (Patient taking differently: 50 mg Take 1 and 1/2 (one half tab) by mouth daily) 30 tablet 0     No facility-administered encounter medications on file as of 2021. Orders Placed This Encounter   Procedures    Basic Metabolic Panel     Standing Status:   Future     Standing Expiration Date:   2022    TSH without Reflex     Standing Status:   Future     Standing Expiration Date:   2022    Glucose     Standing Status:   Future     Standing Expiration Date:   2022    Hepatic Function Panel     Standing Status:   Future     Standing Expiration Date:   2022    EMG     Standing Status:   Future     Standing Expiration Date:   2021     Order Specific Question:   Which body part?      Answer:   both lower exts       SUBJECTIVE/OBJECTIVE:  HPI   Chief Complaint Patient presents with    Numbness     patient c/o numbness- right and left foot \"for a long time\". patient also notes left ankle pain. patient denies injury      Candy Monroy is a 38 y.o. male with the following history as [facility] in [facility]:  Patient Active Problem List    Diagnosis Date Noted    MDD (major depressive disorder), single episode 03/08/2020    Suicidal overdose[facility] ([facility] 03/07/2020    [facility] depressive disorder, [facility] episode, [facility] [address]     Alcohol use disorder, mild, abuse     [facility]     Prepatellar bursitis of left knee     Septic prepatellar bursitis of left knee 07/11/2018    Mixed hyperlipidemia 10/29/2017    Right upper quadrant abdominal pain 10/29/2017    Fatty liver 10/29/2017     Current Outpatient Medications   Medication Sig Dispense Refill    flecainide (TAMBOCOR) 50 MG tablet Take 50 mg by mouth 2 times daily       lisinopril (PRINIVIL;ZESTRIL) 20 MG tablet Take 20 mg by mouth daily       aspirin 81 MG tablet Take 81 mg by mouth daily      diltiazem (CARDIZEM CD) 180 MG extended release capsule Take 1 capsule by mouth daily 30 capsule 0    sertraline (ZOLOFT) 50 MG tablet Take 1 tablet by mouth daily (Patient taking differently: 50 mg Take 1 and 1/2 (one half tab) by mouth daily) 30 tablet 0     No current facility-administered medications for this visit.       Allergies: Naproxen and Nickel  Past Medical History:   Diagnosis Date    Paroxysmal atrial fibrillation [facility])      Past Surgical History:   Procedure Laterality Date    ENDOSCOPY, COLON, DIAGNOSTIC      KNEE ARTHROSCOPY Left     MOUTH SURGERY       Family History   Problem Relation Age of Onset    High Blood Pressure Father     Heart Disease Father     Stroke Father     Cancer Maternal Grandmother     Cancer Maternal Grandfather     Cancer Paternal Grandmother         lung     Social History     Tobacco Use    Smoking status: Never Smoker  Smokeless tobacco: Current User     Types: Snuff    Tobacco comment: dip   Substance Use Topics    Alcohol use: Yes     Alcohol/week: 8.0 - 12.0 standard drinks     Types: 8 - 12 Cans of beer per week     Comment: 8-12 beers daily       Review of Systems   Constitutional: Negative for chills, diaphoresis and fatigue. HENT: Negative for congestion and postnasal drip. Eyes: Negative for visual disturbance. Respiratory: Negative for apnea, cough and shortness of breath. Cardiovascular: Negative for chest pain. Gastrointestinal: Negative for abdominal pain. Genitourinary: Negative for dysuria. Neurological:        Patient presents with:  Numbness: patient c/o numbness- right and left foot \"for a long time\". patient also notes left ankle pain. patient denies injury          Physical Exam  Vitals signs and nursing note reviewed. Constitutional:       Appearance: Normal appearance. Eyes:      Extraocular Movements: Extraocular movements intact. Pupils: Pupils are equal, round, and reactive to light. Neck:      Musculoskeletal: No neck rigidity. Cardiovascular:      Rate and Rhythm: Normal rate and regular rhythm. Heart sounds: No murmur. Pulmonary:      Effort: No respiratory distress. Breath sounds: No wheezing. Abdominal:      General: There is no distension. Tenderness: There is no abdominal tenderness. Musculoskeletal:         General: No swelling. Lymphadenopathy:      Cervical: No cervical adenopathy. Skin:     Coloration: Skin is not jaundiced. Neurological:      Mental Status: He is alert.                  An electronic signature was used to authenticate this note.    --Crystal Membreno DO

## 2021-02-17 NOTE — PATIENT INSTRUCTIONS
Thank you for choosing [facility]. Please bring a current list of medications to every appointment. Please contact your pharmacy for any prescription refill(s) that you are requesting.

## 2021-02-18 LAB
ALBUMIN SERPL-MCNC: 3.9 G/DL (ref 3.4–5)
ALP BLD-CCNC: 115 U/L (ref 40–129)
ALT SERPL-CCNC: 112 U/L (ref 10–40)
ANION GAP SERPL CALCULATED.3IONS-SCNC: 11 MMOL/L (ref 3–16)
AST SERPL-CCNC: 173 U/L (ref 15–37)
BILIRUB SERPL-MCNC: 0.5 MG/DL (ref 0–1)
BILIRUBIN DIRECT: <0.2 MG/DL (ref 0–0.3)
BILIRUBIN, INDIRECT: ABNORMAL MG/DL (ref 0–1)
BUN BLDV-MCNC: 13 MG/DL (ref 7–20)
CALCIUM SERPL-MCNC: 9.5 MG/DL (ref 8.3–10.6)
CHLORIDE BLD-SCNC: 98 MMOL/L (ref 99–110)
CO2: 25 MMOL/L (ref 21–32)
CREAT SERPL-MCNC: 0.8 MG/DL (ref 0.9–1.3)
GFR AFRICAN AMERICAN: >60
GFR NON-AFRICAN AMERICAN: >60
GLUCOSE BLD-MCNC: 188 MG/DL (ref 70–99)
POTASSIUM SERPL-SCNC: 4.4 MMOL/L (ref 3.5–5.1)
SODIUM BLD-SCNC: 134 MMOL/L (ref 136–145)
TOTAL PROTEIN: 7.4 G/DL (ref 6.4–8.2)
TSH SERPL DL<=0.05 MIU/L-ACNC: 2.49 UIU/ML (ref 0.27–4.2)

## 2021-02-22 DIAGNOSIS — R73.9 HYPERGLYCEMIA: Primary | ICD-10-CM

## 2021-07-20 ENCOUNTER — HOSPITAL ENCOUNTER (EMERGENCY)
Age: 36
Discharge: HOME | End: 2021-07-20
Attending: EMERGENCY MEDICINE
Payer: COMMERCIAL

## 2021-07-20 ENCOUNTER — APPOINTMENT (OUTPATIENT)
Dept: CT IMAGING | Age: 36
End: 2021-07-20
Payer: COMMERCIAL

## 2021-07-20 VITALS
OXYGEN SATURATION: 98 % | HEART RATE: 72 BPM | BODY MASS INDEX: 33.68 KG/M2 | TEMPERATURE: 98.8 F | DIASTOLIC BLOOD PRESSURE: 74 MMHG | RESPIRATION RATE: 16 BRPM | WEIGHT: 262.35 LBS | SYSTOLIC BLOOD PRESSURE: 120 MMHG

## 2021-07-20 DIAGNOSIS — R55 SYNCOPE AND COLLAPSE: ICD-10-CM

## 2021-07-20 DIAGNOSIS — S01.81XA FACIAL LACERATION, INITIAL ENCOUNTER: Primary | ICD-10-CM

## 2021-07-20 DIAGNOSIS — S02.2XXA CLOSED FRACTURE OF NASAL BONE, INITIAL ENCOUNTER: ICD-10-CM

## 2021-07-20 DIAGNOSIS — E87.1 HYPONATREMIA: ICD-10-CM

## 2021-07-20 LAB
A/G RATIO: 1.2 (ref 1.1–2.2)
ALBUMIN SERPL-MCNC: 3.9 G/DL (ref 3.4–5)
ALP BLD-CCNC: 122 U/L (ref 40–129)
ALT SERPL-CCNC: 90 U/L (ref 10–40)
ANION GAP SERPL CALCULATED.3IONS-SCNC: 16 MMOL/L (ref 3–16)
AST SERPL-CCNC: 183 U/L (ref 15–37)
BASOPHILS ABSOLUTE: 0.3 K/UL (ref 0–0.2)
BASOPHILS RELATIVE PERCENT: 2.4 %
BILIRUB SERPL-MCNC: 1.2 MG/DL (ref 0–1)
BUN BLDV-MCNC: 8 MG/DL (ref 7–20)
CALCIUM SERPL-MCNC: 8.7 MG/DL (ref 8.3–10.6)
CHLORIDE BLD-SCNC: 91 MMOL/L (ref 99–110)
CO2: 20 MMOL/L (ref 21–32)
CREAT SERPL-MCNC: 0.7 MG/DL (ref 0.9–1.3)
EOSINOPHILS ABSOLUTE: 0.1 K/UL (ref 0–0.6)
EOSINOPHILS RELATIVE PERCENT: 1 %
GFR AFRICAN AMERICAN: >60
GFR NON-AFRICAN AMERICAN: >60
GLOBULIN: 3.2 G/DL
GLUCOSE BLD-MCNC: 163 MG/DL (ref 70–99)
HCT VFR BLD CALC: 43.7 % (ref 40.5–52.5)
HEMOGLOBIN: 14.6 G/DL (ref 13.5–17.5)
LYMPHOCYTES ABSOLUTE: 2.4 K/UL (ref 1–5.1)
LYMPHOCYTES RELATIVE PERCENT: 18.2 %
MCH RBC QN AUTO: 31.1 PG (ref 26–34)
MCHC RBC AUTO-ENTMCNC: 33.4 G/DL (ref 31–36)
MCV RBC AUTO: 93.1 FL (ref 80–100)
MONOCYTES ABSOLUTE: 0.8 K/UL (ref 0–1.3)
MONOCYTES RELATIVE PERCENT: 6.3 %
NEUTROPHILS ABSOLUTE: 9.8 K/UL (ref 1.7–7.7)
NEUTROPHILS RELATIVE PERCENT: 72.1 %
PDW BLD-RTO: 11.8 % (ref 12.4–15.4)
PLATELET # BLD: 240 K/UL (ref 135–450)
PMV BLD AUTO: 7.8 FL (ref 5–10.5)
POTASSIUM REFLEX MAGNESIUM: 3.8 MMOL/L (ref 3.5–5.1)
RBC # BLD: 4.7 M/UL (ref 4.2–5.9)
SODIUM BLD-SCNC: 127 MMOL/L (ref 136–145)
TOTAL PROTEIN: 7.1 G/DL (ref 6.4–8.2)
TROPONIN: <0.01 NG/ML
WBC # BLD: 13.4 K/UL (ref 4–11)

## 2021-07-20 PROCEDURE — 93005 ELECTROCARDIOGRAM TRACING: CPT | Performed by: EMERGENCY MEDICINE

## 2021-07-20 PROCEDURE — 84484 ASSAY OF TROPONIN QUANT: CPT

## 2021-07-20 PROCEDURE — 36415 COLL VENOUS BLD VENIPUNCTURE: CPT

## 2021-07-20 PROCEDURE — 70486 CT MAXILLOFACIAL W/O DYE: CPT

## 2021-07-20 PROCEDURE — 80053 COMPREHEN METABOLIC PANEL: CPT

## 2021-07-20 PROCEDURE — 85025 COMPLETE CBC W/AUTO DIFF WBC: CPT

## 2021-07-20 PROCEDURE — 2580000003 HC RX 258: Performed by: EMERGENCY MEDICINE

## 2021-07-20 PROCEDURE — 99284 EMERGENCY DEPT VISIT MOD MDM: CPT

## 2021-07-20 PROCEDURE — 12011 RPR F/E/E/N/L/M 2.5 CM/<: CPT

## 2021-07-20 RX ORDER — CEFADROXIL 500 MG/1
500 CAPSULE ORAL 2 TIMES DAILY
Qty: 20 CAPSULE | Refills: 0 | Status: SHIPPED | OUTPATIENT
Start: 2021-07-20 | End: 2021-07-30

## 2021-07-20 RX ORDER — 0.9 % SODIUM CHLORIDE 0.9 %
1000 INTRAVENOUS SOLUTION INTRAVENOUS ONCE
Status: DISCONTINUED | OUTPATIENT
Start: 2021-07-20 | End: 2021-07-20

## 2021-07-20 RX ORDER — 0.9 % SODIUM CHLORIDE 0.9 %
500 INTRAVENOUS SOLUTION INTRAVENOUS ONCE
Status: COMPLETED | OUTPATIENT
Start: 2021-07-20 | End: 2021-07-20

## 2021-07-20 RX ADMIN — SODIUM CHLORIDE 1000 ML: 9 INJECTION, SOLUTION INTRAVENOUS at 22:10

## 2021-07-20 ASSESSMENT — ENCOUNTER SYMPTOMS
RHINORRHEA: 0
VOMITING: 0
EYE DISCHARGE: 0
NAUSEA: 0
WHEEZING: 0
EYE REDNESS: 0
SORE THROAT: 0
COUGH: 0
EYE PAIN: 0
SHORTNESS OF BREATH: 0
DIARRHEA: 0
BACK PAIN: 0
ABDOMINAL PAIN: 0

## 2021-07-20 ASSESSMENT — PAIN DESCRIPTION - FREQUENCY: FREQUENCY: CONTINUOUS

## 2021-07-20 ASSESSMENT — PAIN SCALES - GENERAL
PAINLEVEL_OUTOF10: 7
PAINLEVEL_OUTOF10: 5

## 2021-07-20 ASSESSMENT — PAIN DESCRIPTION - PAIN TYPE
TYPE: ACUTE PAIN
TYPE: ACUTE PAIN

## 2021-07-20 ASSESSMENT — PAIN DESCRIPTION - ORIENTATION: ORIENTATION: RIGHT

## 2021-07-20 ASSESSMENT — PAIN DESCRIPTION - LOCATION
LOCATION: NOSE
LOCATION: NOSE;HEAD

## 2021-07-20 ASSESSMENT — PAIN - FUNCTIONAL ASSESSMENT: PAIN_FUNCTIONAL_ASSESSMENT: 0-10

## 2021-07-21 LAB
EKG ATRIAL RATE: 72 BPM
EKG DIAGNOSIS: NORMAL
EKG P AXIS: 41 DEGREES
EKG P-R INTERVAL: 176 MS
EKG Q-T INTERVAL: 422 MS
EKG QRS DURATION: 104 MS
EKG QTC CALCULATION (BAZETT): 462 MS
EKG R AXIS: 73 DEGREES
EKG T AXIS: 38 DEGREES
EKG VENTRICULAR RATE: 72 BPM

## 2021-07-21 PROCEDURE — 93010 ELECTROCARDIOGRAM REPORT: CPT | Performed by: INTERNAL MEDICINE

## 2021-07-21 NOTE — ED NOTES
Steri-strips x 2 intact to avulsion at bridge of nose. Polysporin, adaptic, dry, sterile gauze dressing followed by Orest Drilling applied to left knee abrasion.       Bryn Durbin RN  07/20/21 4408

## 2021-07-21 NOTE — ED NOTES
NSS started. Pt asking how long it takes to infuse NSS. States he is ready to go home. Informed usually infuses over 40-60 minutes.       Jasper Hendrix RN  07/20/21 1128

## 2021-07-21 NOTE — ED NOTES
Wound to nose and left knee cleaned with Hibiclins/ NSS. Small avulsion noted to right bridge of nose, abrasion noted to outer right nare. Abrasion noted to left anterior knee.       Lorenza Castillo RN  07/20/21 9553

## 2021-07-21 NOTE — ED TRIAGE NOTES
Pt to ED per wife with complaint of laceration to right bridge of nose and left knee after passing out and falling to ground. Pt states he was traveling in a hot car, stopped at a local grocer and as he was walking in became lightheaded, states he does not recall falling but woke up a few moments later and was on the concrete. States he does not feel dizzy or lightheaded at present. States he had not eaten all day, has since eaten a half chicken sandwich. States at present he feels fine, denies dizziness, lightheadedness. States he has not had any ETOH today. Speech clear, appropriate. TANIYA. Gait steady while walking. Wife at bedside.

## 2021-07-21 NOTE — ED NOTES
Pt states he does not need IVF, would rather drink water. MD made aware. IVF discontinued. Pt given 20 ounce glass of water.       Patrick Roper RN  07/20/21 0028

## 2021-07-21 NOTE — ED PROVIDER NOTES
[facility]  eMERGENCY dEPARTMENT eNCOUnter        Pt Name: Brock Spurling  MRN: [id number]  [place] 1985  Date of evaluation: 7/20/2021  Provider: Giorgio Nath MD  PCP: Martha Leal MD      [address] [place] [address]       Chief Complaint   Patient presents with    Laceration     to nose, left knee. States he was walking into [facility] and became lightheaded, passed out and fell, hitting his head on concrete. HISTORY OFPRESENT ILLNESS   (Location/Symptom, Timing/Onset, Context/Setting, Quality, Duration, Modifying Factors,Severity)  Note limiting factors. Brock Spurling is a 43 y.o. male     Who comes in because of a syncopal event. He had just been getting home from a job. He did not have air conditioning in the car and it was quite warm. He had about an hour and a half drive. He stopped at "[employer] and as he got out he started feeling lightheaded and then apparently passed out but it sounds like only for a second or 2. Importantly, though he did hit the ground. He fell on his face and injured his knee. He also sustained an abrasion to his left knee. He actually got himself up and drove home. He is now brought in by his wife. Absolutely denies any alcohol use. Last tetanus was 2018. No chest pain. He does have a history of atrial fibrillation and he takes flecainide and Cardizem. He is not on anticoagulation. Nursing Noteswere all reviewed and agreed with or any disagreements were addressed  in the HPI. REVIEW OF SYSTEMS    (2-9 systems for level 4, 10 or more for level 5)     Review of Systems   Constitutional: Negative for chills, fatigue and fever. HENT: Negative for ear pain, rhinorrhea and sore throat. Eyes: Negative for pain, discharge, redness and visual disturbance. Respiratory: Negative for cough, shortness of breath and wheezing. Cardiovascular: Negative for chest pain, palpitations and leg swelling.    Gastrointestinal: Negative for abdominal pain, diarrhea, nausea and vomiting. Genitourinary: Negative for difficulty urinating and dysuria. Musculoskeletal: Negative for arthralgias, back pain and myalgias. Skin: Positive for wound. Negative for rash. Allergic/Immunologic: Negative for environmental allergies. Neurological: Positive for syncope. Negative for dizziness, seizures and headaches. Hematological: Negative for adenopathy. Psychiatric/Behavioral: Negative for suicidal ideas. The patient is not nervous/anxious. PAST MEDICAL HISTORY     Past Medical History:   Diagnosis Date    Hypertension     Paroxysmal atrial fibrillation ([address]          SURGICAL HISTORY     Past Surgical History:   Procedure Laterality Date    ENDOSCOPY, COLON, DIAGNOSTIC      KNEE ARTHROSCOPY Left     MOUTH SURGERY           CURRENTMEDICATIONS       Previous Medications    DILTIAZEM (CARDIZEM CD) 180 MG EXTENDED RELEASE CAPSULE    Take 1 capsule by mouth daily    FLECAINIDE (TAMBOCOR) 50 MG TABLET    Take 50 mg by mouth 2 times daily     LISINOPRIL (PRINIVIL;ZESTRIL) 20 MG TABLET    Take 20 mg by mouth daily     SERTRALINE (ZOLOFT) 50 MG TABLET    Take 1 tablet by mouth daily       ALLERGIES     Naproxen and Nickel    FAMILY HISTORY       Family History   Problem Relation Age of Onset    High Blood Pressure Father     Heart Disease Father     Stroke Father     Cancer Maternal Grandmother     Cancer Maternal Grandfather     Cancer Paternal Grandmother         lung          SOCIAL HISTORY       Social History     Socioeconomic History    Marital status:      Spouse name: None    Number of children: None    Years of education: None    Highest education level: None   Occupational History    None   Tobacco Use    Smoking status: Never Smoker    Smokeless tobacco: Current User     Types: Snuff    Tobacco comment: dip   Vaping Use    Vaping Use: Never used   Substance and Sexual Activity    Alcohol use:  Yes     Alcohol/week: 8.0 - 12.0 standard drinks     Types: 8 - 12 Cans of beer per week     Comment: 8-12 beers 5 days/week    Drug use: No    Sexual activity: Yes     Partners: Male   Other Topics Concern    None   Social History Narrative    None     Social Determinants of Health     Financial Resource Strain: Low Risk     Difficulty of Paying Living Expenses: Not hard at all   Food Insecurity: No Food Insecurity    Worried About Running Out of Food in the Last Year: Never true    Sonia of Food in the Last Year: Never true   Transportation Needs: No Transportation Needs    Lack of Transportation (Medical): No    Lack of Transportation (Non-Medical): No   Physical Activity:     Days of Exercise per Week:     Minutes of Exercise per Session:    Stress:     Feeling of Stress :    Social Connections:     Frequency of Communication with Friends and Family:     Frequency of Social Gatherings with Friends and Family:     Attends [employer] Services:     Active Member of Clubs or Organizations:     Attends Club or Organization Meetings:     Marital Status:    Intimate Partner Violence:     Fear of Current or Ex-Partner:     Emotionally Abused:     Physically Abused:     Sexually Abused:        SCREENINGS             PHYSICAL EXAM    (up to 7 for level 4, 8 or more for level 5)     ED Triage Vitals [07/20/21 1958]   BP Temp Temp Source Pulse Resp SpO2 Height Weight   122/75 98.8 °F (37.1 °C) Oral 91 16 97 % -- 262 lb 5.6 oz (119 kg)      weight is 262 lb 5.6 oz (119 kg). His oral temperature is 98.8 °F (37.1 °C). His blood pressure is 123/76 and his pulse is 68. His respiration is 16 and oxygen saturation is 99%. Physical Exam  Constitutional:       Appearance: He is well-developed. He is not diaphoretic. HENT:      Head: Normocephalic. Right Ear: External ear normal.      Left Ear: External ear normal.      Nose: Laceration and nasal tenderness present.       Comments: Patient has an approximate 1.2 cm laceration along the bridge of the nose but it cannot be pulled apart and it seems to be self approximating. There is no septal hematoma no active epistaxis. Eyes:      General: No scleral icterus. Right eye: No discharge. Left eye: No discharge. Extraocular Movements: Extraocular movements intact. Pupils: Pupils are equal, round, and reactive to light. Neck:      Thyroid: No thyromegaly. Vascular: No JVD. Trachea: No tracheal deviation. Cardiovascular:      Rate and Rhythm: Normal rate and regular rhythm. Heart sounds: No murmur heard. No friction rub. No gallop. Pulmonary:      Effort: Pulmonary effort is normal. No respiratory distress. Breath sounds: Normal breath sounds. No stridor. No wheezing or rales. Abdominal:      General: There is no distension. Palpations: Abdomen is soft. Tenderness: There is no abdominal tenderness. There is no guarding or rebound. Musculoskeletal:         General: No tenderness. Cervical back: Normal range of motion. Comments: Abrasion to the left knee with minimal swelling but no bony tenderness full range of motion and is a mention he has been ambulatory and even drove home after the incident. Skin:     General: Skin is warm and dry. Findings: No rash (On exposed body surfaces). Neurological:      General: No focal deficit present. Mental Status: He is alert and oriented to person, place, and time. GCS: GCS eye subscore is 4. GCS verbal subscore is 5. GCS motor subscore is 6. Cranial Nerves: Cranial nerves are intact. Coordination: Coordination normal.   Psychiatric:         Behavior: Behavior normal.         Thought Content:  Thought content normal.         DIAGNOSTIC RESULTS   LABS:    Results for orders placed or performed during the hospital encounter of 07/20/21   CBC Auto Differential   Result Value Ref Range    WBC 13.4 (H) 4.0 - 11.0 K/uL    RBC 4.70 4.20 - 5.90 M/uL    Hemoglobin 14.6 13.5 - 17.5 g/dL    Hematocrit 43.7 40.5 - 52.5 %    MCV 93.1 80.0 - 100.0 fL    MCH 31.1 26.0 - 34.0 pg    MCHC 33.4 31.0 - 36.0 g/dL    RDW 11.8 (L) 12.4 - 15.4 %    Platelets 468 819 - 476 K/uL    MPV 7.8 5.0 - 10.5 fL    Neutrophils % 72.1 %    Lymphocytes % 18.2 %    Monocytes % 6.3 %    Eosinophils % 1.0 %    Basophils % 2.4 %    Neutrophils Absolute 9.8 (H) 1.7 - 7.7 K/uL    Lymphocytes Absolute 2.4 1.0 - 5.1 K/uL    Monocytes Absolute 0.8 0.0 - 1.3 K/uL    Eosinophils Absolute 0.1 0.0 - 0.6 K/uL    Basophils Absolute 0.3 (H) 0.0 - 0.2 K/uL   Comprehensive Metabolic Panel w/ Reflex to MG   Result Value Ref Range    Sodium 127 (L) 136 - 145 mmol/L    Potassium reflex Magnesium 3.8 3.5 - 5.1 mmol/L    Chloride 91 (L) 99 - 110 mmol/L    CO2 20 (L) 21 - 32 mmol/L    Anion Gap 16 3 - 16    Glucose 163 (H) 70 - 99 mg/dL    BUN 8 7 - 20 mg/dL    CREATININE 0.7 (L) 0.9 - 1.3 mg/dL    GFR Non-African American >60 >60    GFR African American >60 >60    Calcium 8.7 8.3 - 10.6 mg/dL    Total Protein 7.1 6.4 - 8.2 g/dL    Albumin 3.9 3.4 - 5.0 g/dL    Albumin/Globulin Ratio 1.2 1.1 - 2.2    Total Bilirubin 1.2 (H) 0.0 - 1.0 mg/dL    Alkaline Phosphatase 122 40 - 129 U/L    ALT 90 (H) 10 - 40 U/L     (H) 15 - 37 U/L    Globulin 3.2 g/dL   Troponin   Result Value Ref Range    Troponin <0.01 <0.01 ng/mL       All other labs were within normal range or not returned as of this dictation. EKG: All EKG's are interpreted by the Emergency Department Physician who either signs orCo-signs this chart in the absence of a cardiologist.    EKG visualized preliminarily interpreted by myself shows sinus rhythm at a rate of 72 with an axis of 73. Inverted P wave in lead V1. Otherwise femoral normal cardiogram.  Absolutely no nonspecific ST findings somewhat globally.   Intervals are normal    RADIOLOGY:   Non-plain film images such as CT, Ultrasound and MRI are read by the radiologist. Plain radiographic images are visualized and preliminarily interpreted by the  EDProvider with the below findings:    CT MAXILLOFACIAL WO CONTRAST    Result Date: 7/20/2021  EXAMINATION: CT OF THE FACE WITHOUT CONTRAST  7/20/2021 8:47 pm TECHNIQUE: CT of the face was performed without the administration of intravenous contrast. Multiplanar reformatted images are provided for review. Dose modulation, iterative reconstruction, and/or weight based adjustment of the mA/kV was utilized to reduce the radiation dose to as low as reasonably achievable. COMPARISON: None HISTORY: ORDERING SYSTEM PROVIDED HISTORY: syncope/fall/suspect nasal fx GCS 15 TECHNOLOGIST PROVIDED HISTORY: Reason for exam:->syncope/fall/suspect nasal fx GCS 15 Decision Support Exception - unselect if not a suspected or confirmed emergency medical condition->Emergency Medical Condition (MA) Reason for Exam: injury bridge of nose from syncopal episode Acuity: Acute Type of Exam: Initial Mechanism of Injury: syncope FINDINGS: FACIAL BONES:  The maxilla, pterygoid plates, and zygomatic arches are intact. The mandible is intact. The mandibular condyles are normally situated. Acute displaced comminuted nasal bone fractures. Acute displaced fracture of the anterior nasal septum with right nasal septal deviation. ORBITS: The globes, extraocular muscles, optic nerve sheath complexes and lacrimal glands appear symmetric. No retrobulbar hematoma or mass is identified. The orbital walls and rims are intact. SINUSES/MASTOIDS: Mucous retention cysts or polyps in the maxillary sinuses. Mild mucosal thickening in the ethmoid and maxillary sinuses. Mastoid air cells are clear. SOFT TISSUES:  Edema in the nasal bridge and nares. 1. Acute nasal bone and nasal septal fractures. 2. Other findings as described. PROCEDURES   Unless otherwise noted below, none     Lac Repair    Date/Time: 7/20/2021 10:57 PM  Performed by: Josi Keen MD  Authorized by:  Josi Keen MD Consent:     Consent obtained:  Verbal    Consent given by:  Patient  Anesthesia (see MAR for exact dosages): Anesthesia method:  None  Laceration details:     Location:  Face    Face location:  Nose    Length (cm):  1.2    Depth (mm):  1  Repair type:     Repair type:  Simple  Pre-procedure details:     Preparation:  Imaging obtained to evaluate for foreign bodies  Exploration:     Hemostasis achieved with:  Direct pressure  Treatment:     Area cleansed with:  Saline    Amount of cleaning:  Standard  Skin repair:     Repair method:  Steri-Strips    Number of Steri-Strips:  2  Approximation:     Approximation:  Close  Post-procedure details:     Dressing:  Open (no dressing)    Patient tolerance of procedure: Tolerated well, no immediate complications  Comments: The wound really did not need much. I did not have to anesthetize this. I cleaned with normal saline allowed to dry. I then applied a small amount of benzoin and placed 2 small Steri-Strips. CRITICAL CARE TIME   N/A    CONSULTS:  None    EMERGENCY DEPARTMENT COURSE and DIFFERENTIAL DIAGNOSIS/MDM:   Vitals:    Vitals:    07/20/21 1958 07/20/21 2022 07/20/21 2138   BP: 122/75 126/75 123/76   Pulse: 91 74 68   Resp: 16 16 16   Temp: 98.8 °F (37.1 °C)     TempSrc: Oral     SpO2: 97% 97% 99%   Weight: 262 lb 5.6 oz (119 kg)         Patient was given the following medications:  Medications   0.9 % sodium chloride bolus (1,000 mLs Intravenous New Bag 7/20/21 2210)       Low sodium was a bit of a surprise. Patient again was asked about alcohol and denies that. However he did tell the nurse that he does drink occasionally. Although I cannot be sure alcohol use has to at least be considered as a possible etiology of the low sodium. Otherwise it might just be some dehydration from sweating a lot and doing all the work in the heat. I had ordered a liter of fluid which he then declined stating he just wanted to go home and drink fluids. However, when the sodium came back at 127 I strongly recommended he let me give at least a liter of saline. So, he did agree to that. The electrolytes should get repeated later on this week. He is not on a diuretic. I also recommend he take a couple days off work. FINAL IMPRESSION      1. Facial laceration, initial encounter    2. Closed fracture of nasal bone, initial encounter    3. Hyponatremia    4.  Syncope and collapse          DISPOSITION/PLAN    DISPOSITION Decision To Discharge 07/20/2021 10:46:34 PM      PATIENT REFERRED TO:  Mindi Clancy MD  [address] [facility]  [phone]    In 2 days      [facility], [address] [facility] ([facility])  [address] [place] [address]  [phone]      This is our specialist for nasal fracture      DISCHARGE MEDICATIONS:  New Prescriptions    CEFADROXIL (DURICEF) 500 MG CAPSULE    Take 1 capsule by mouth 2 times daily for 10 days       DISCONTINUED MEDICATIONS:  Discontinued Medications    ASPIRIN 81 MG TABLET    Take 81 mg by mouth daily              (Please note that portions of this note were completed with a voice recognition program.  Efforts were made to editthe dictations but occasionally words are mis-transcribed.)    Karmen Wolf MD (electronically signed)            Karmen Wolf MD  07/20/21 6718

## 2021-07-22 ENCOUNTER — ANESTHESIA EVENT (OUTPATIENT)
Dept: OPERATING ROOM | Age: 36
End: 2021-07-22
Payer: COMMERCIAL

## 2021-07-22 ENCOUNTER — OFFICE VISIT (OUTPATIENT)
Dept: ENT CLINIC | Age: 36
End: 2021-07-22
Payer: COMMERCIAL

## 2021-07-22 VITALS
SYSTOLIC BLOOD PRESSURE: 126 MMHG | WEIGHT: 253 LBS | BODY MASS INDEX: 32.48 KG/M2 | DIASTOLIC BLOOD PRESSURE: 84 MMHG | HEART RATE: 66 BPM

## 2021-07-22 DIAGNOSIS — S02.2XXA CLOSED FRACTURE OF NASAL SEPTUM, INITIAL ENCOUNTER: ICD-10-CM

## 2021-07-22 DIAGNOSIS — S02.2XXA CLOSED FRACTURE OF NASAL BONE, INITIAL ENCOUNTER: Primary | ICD-10-CM

## 2021-07-22 DIAGNOSIS — R09.81 NASAL CONGESTION: ICD-10-CM

## 2021-07-22 DIAGNOSIS — R73.9 HYPERGLYCEMIA: ICD-10-CM

## 2021-07-22 LAB
ANION GAP SERPL CALCULATED.3IONS-SCNC: 12 MMOL/L (ref 3–16)
BUN BLDV-MCNC: 8 MG/DL (ref 7–20)
CALCIUM SERPL-MCNC: 9.3 MG/DL (ref 8.3–10.6)
CHLORIDE BLD-SCNC: 99 MMOL/L (ref 99–110)
CO2: 27 MMOL/L (ref 21–32)
CREAT SERPL-MCNC: 0.8 MG/DL (ref 0.9–1.3)
GFR AFRICAN AMERICAN: >60
GFR NON-AFRICAN AMERICAN: >60
GLUCOSE BLD-MCNC: 187 MG/DL (ref 70–99)
POTASSIUM SERPL-SCNC: 4.8 MMOL/L (ref 3.5–5.1)
SODIUM BLD-SCNC: 138 MMOL/L (ref 136–145)

## 2021-07-22 PROCEDURE — 99244 OFF/OP CNSLTJ NEW/EST MOD 40: CPT | Performed by: OTOLARYNGOLOGY

## 2021-07-22 RX ORDER — IBUPROFEN 200 MG
200 TABLET ORAL EVERY 6 HOURS PRN
COMMUNITY
End: 2022-04-05

## 2021-07-22 RX ORDER — TRAZODONE HYDROCHLORIDE 100 MG/1
100 TABLET ORAL NIGHTLY PRN
COMMUNITY

## 2021-07-22 NOTE — PROGRESS NOTES
SAFETY FIRST. .call before you fall      [address]  time__1150__________        Surgery time____1350________    Take the following medications with a sip of water: Follow your Doctor's pre procedure instructions regarding medications    Do not eat or drink anything after 12:00 midnight prior to your surgery. This includes water chewing gum, mints and ice chips. You may brush your teeth and gargle the morning of your surgery, but do not swallow the water     Please see your family doctor/pediatrician for a history and physical and/or concerning medications. Bring any test results/reports from your physicians office. If you are under the care of a heart doctor or specialist doctor, please be aware that you may be asked to them for clearance    You may be asked to stop blood thinners such as Coumadin, Plavix, Fragmin, Lovenox, etc., or any anti-inflammatories such as:  Aspirin, Ibuprofen, Advil, Naproxen prior to your surgery. We also ask that you stop any OTC medications such as fish oil, vitamin E, glucosamine, garlic, Multivitamins, COQ 10, etc.    We ask that you do not smoke 24 hours prior to surgery  We ask that you do not  drink any alcoholic beverages 24 hours prior to surgery     You must make arrangements for a responsible adult to take you home after your surgery. For your safety you will not be allowed to leave alone or drive yourself home. Your surgery will be cancelled if you do not have a ride home. Also for your safety, it is strongly suggested that someone stay with you the first 24 hours after your surgery. A parent or legal guardian must accompany a child scheduled for surgery and plan to stay at the hospital until the child is discharged. Please do not bring other children with you. For your comfort, please wear simple loose fitting clothing to the hospital.  Please do not bring valuables.     Do not wear any make-up or nail polish on your fingers or toes      For your safety, please do not wear any jewelry or body piercing's on the day of surgery. All jewelry must be removed. If you have dentures, they will be removed before going to operating room. For your convenience, we will provide you with a container. If you wear contact lenses or glasses, they will be removed, please bring a case for them. If you have a living will and a durable power of  for healthcare, please bring in a copy. As part of our patient safety program to minimize surgical site infections, we ask you to do the following:    · Please notify your surgeon if you develop any illness between         now and the  day of your surgery. · This includes a cough, cold, fever, sore throat, nausea,         or vomiting, and diarrhea, etc.  ·  Please notify your surgeon if you experience dizziness, shortness         of breath or blurred vision between now and the time of your surgery. Do not shave your operative site 96 hours prior to surgery. For face and neck surgery, men may use an electric razor 48 hours   prior to surgery. You may shower the night before surgery or the morning of   your surgery with an antibacterial soap. You will need to bring a photo ID and insurance card    [facility] has an onsite pharmacy, would you like to utilize our pharmacy     If you will be staying overnight and use a C-pap machine, please bring   your C-pap to hospital     Our goal is to provide you with excellent care, therefore, visitors will be limited to two(2) in the room at a time so that we may focus on providing this care for you. Please contact pre-admission testing if you have any further questions.                  [facility] phone number:  [phone] PAT fax number:  [phone]  Please note these are generalized instructions for all surgical cases, you may be provided with more specific instructions according to your surgery. Preoperative Screening for Elective Surgery/Invasive Procedures While COVID-19 present in the community     Have you tested positive or have been told to self-isolate for COVID-19 like symptoms within the past 28 days? no   Do you currently have any of the following symptoms?no  o Fever >100.0 F or 99.9 F in immunocompromised patients? o New onset cough, shortness of breath or difficulty breathing?  o New onset sore throat, myalgia (muscle aches and pains), headache, loss of taste/smell or diarrhea?  Have you had a potential exposure to COVID-19 within the past 14 days by:  o Close contact with a confirmed case? o Close contact with a healthcare worker,  or essential infrastructure worker (grocery store, [employer], gas station, public utilities or transportation)? o Do you reside in a congregate setting such as; skilled nursing facility, adult home, correctional facility, homeless shelter or other institutional setting?  o Have you had recent travel to a known COVID-19 hotspot? Indicate if the patient has a positive screen by answering yes to one or more of the above questions. Patients who test positive or screen positive prior to surgery or on the day of surgery should be evaluated in conjunction with the surgeon/proceduralist/anesthesiologist to determine the urgency of the procedure. SAFETY FIRST. .call before you fall

## 2021-07-22 NOTE — PROGRESS NOTES
[facility]      Patient Name: Linsey Meza  Medical Record Number:  <[id number]  Primary Care Physician:  Moody Granger MD  Date of Consultation: 7/22/2021    Chief Complaint: Facial trauma        HISTORY OF PRESENT ILLNESS  Alexi Fields is a(n) 43 y.o. male who presents for evaluation of facial trauma. On July 20 the patient says he was going to the grocery store. He he passed out and struck his nose. The syncope was attributed to some hyponatremia from some dehydration as well as alcohol abuse. Patient is feeling better now from that standpoint. Says that he thinks that his nose is now crooked. He also has some nasal congestion. He does not think that he is ever broke in the past.  He denies vision changes, malocclusion or any other symptoms. REVIEW OF SYSTEMS  As above    PHYSICAL EXAM  GENERAL: No Acute Distress, Alert and Oriented, no Hoarseness, strong voice  EYES: EOMI, Anti-icteric  HENT:   Head: Normocephalic and atraumatic. Face:  Symmetric, facial nerve intact, no sinus tenderness  Right Ear: Normal external ear, normal external auditory canal, intact tympanic membrane with normal mobility and aerated middle ear  Left Ear: Normal external ear, normal external auditory canal, intact tympanic membrane with normal mobility and aerated middle ear  Mouth/Oral Cavity: Normal occlusion  Oropharynx/Larynx:  normal oropharynx  Nose: There is an abrasion covered with some Steri-Strips on the nasal dorsum. There appears to be a shift to the left of the nasal dorsum. Anterior anoscopy shows an S-shaped septal deformity with a right anterior and more posterior leftward deviation. No evidence of septal hematoma.   NECK: Normal range of motion, no thyromegaly, trachea is midline, no lymphadenopathy, no neck masses, no crepitus  CHEST: Normal respiratory effort, no retractions, breathing comfortably  SKIN: No rashes, normal appearing skin, no evidence of skin lesions/tumors  Neuro:  cranial nerve II-XII intact; normal gait  Cardio:  no edema        RADIOLOGY  Summary of findings:  I reviewed the CT scan. The patient has displaced nasal bone fractures as well as septal fractures. No other maxillofacial fractures noted      ASSESSMENT/PLAN  1. Closed fracture of nasal bone, initial encounter  Patient has a deviated fracture of his nasal bones. This is causing obvious deformity. This along with the fracture septum is also causing additional nasal congestion. I recommended a closed reduction of the nasal bone fracture and a possible septoplasty. We usually to fix these within 7 to 10 days. He understands the main risk is ongoing cosmetic deformity. Patient agrees with the plan. I am when to get a BMP just to make sure he is not still significantly hyponatremic as this can make general anesthesia a little bit more dangerous. I have ordered this lab and told him to get it before tomorrow. We will plan on doing this tomorrow in the operating room. My partner Brandy Knows may end up performing the procedure as well both in the operating room tomorrow and it will depend on which of us is available earlier. 2. Closed fracture of nasal septum, initial encounter  As above    3. Nasal congestion  Secondary to the septal and nasal bone fracture  - BASIC METABOLIC PANEL; Future             I have performed a head and neck physical exam personally or was physically present during the key or critical portions of the service. This note was generated completely or in part utilizing Dragon dictation speech recognition software. Occasionally, words are mistranscribed and despite editing, the text may contain inaccuracies due to incorrect word recognition. If further clarification is needed please contact the office at [phone].

## 2021-07-23 ENCOUNTER — HOSPITAL ENCOUNTER (OUTPATIENT)
Age: 36
Setting detail: OUTPATIENT SURGERY
Discharge: HOME | End: 2021-07-23
Attending: STUDENT IN AN ORGANIZED HEALTH CARE EDUCATION/TRAINING PROGRAM | Admitting: STUDENT IN AN ORGANIZED HEALTH CARE EDUCATION/TRAINING PROGRAM
Payer: COMMERCIAL

## 2021-07-23 ENCOUNTER — ANESTHESIA (OUTPATIENT)
Dept: OPERATING ROOM | Age: 36
End: 2021-07-23
Payer: COMMERCIAL

## 2021-07-23 VITALS
OXYGEN SATURATION: 95 % | SYSTOLIC BLOOD PRESSURE: 124 MMHG | WEIGHT: 250 LBS | TEMPERATURE: 97.2 F | HEIGHT: 74 IN | BODY MASS INDEX: 32.08 KG/M2 | DIASTOLIC BLOOD PRESSURE: 83 MMHG | RESPIRATION RATE: 16 BRPM | HEART RATE: 72 BPM

## 2021-07-23 VITALS
SYSTOLIC BLOOD PRESSURE: 118 MMHG | DIASTOLIC BLOOD PRESSURE: 65 MMHG | RESPIRATION RATE: 24 BRPM | OXYGEN SATURATION: 90 %

## 2021-07-23 LAB
ESTIMATED AVERAGE GLUCOSE: 137 MG/DL
GLUCOSE BLD-MCNC: 182 MG/DL (ref 70–99)
HBA1C MFR BLD: 6.4 %
PERFORMED ON: ABNORMAL
SARS-COV-2, NAAT: NOT DETECTED

## 2021-07-23 PROCEDURE — 7100000010 HC PHASE II RECOVERY - FIRST 15 MIN: Performed by: STUDENT IN AN ORGANIZED HEALTH CARE EDUCATION/TRAINING PROGRAM

## 2021-07-23 PROCEDURE — 3600000012 HC SURGERY LEVEL 2 ADDTL 15MIN: Performed by: STUDENT IN AN ORGANIZED HEALTH CARE EDUCATION/TRAINING PROGRAM

## 2021-07-23 PROCEDURE — 3700000000 HC ANESTHESIA ATTENDED CARE: Performed by: STUDENT IN AN ORGANIZED HEALTH CARE EDUCATION/TRAINING PROGRAM

## 2021-07-23 PROCEDURE — 30930 THER FX NASAL INF TURBINATE: CPT | Performed by: STUDENT IN AN ORGANIZED HEALTH CARE EDUCATION/TRAINING PROGRAM

## 2021-07-23 PROCEDURE — 2500000003 HC RX 250 WO HCPCS

## 2021-07-23 PROCEDURE — 2580000003 HC RX 258: Performed by: ANESTHESIOLOGY

## 2021-07-23 PROCEDURE — 3600000002 HC SURGERY LEVEL 2 BASE: Performed by: STUDENT IN AN ORGANIZED HEALTH CARE EDUCATION/TRAINING PROGRAM

## 2021-07-23 PROCEDURE — 21337 CLOSED TX SEPTAL&NOSE FX: CPT | Performed by: STUDENT IN AN ORGANIZED HEALTH CARE EDUCATION/TRAINING PROGRAM

## 2021-07-23 PROCEDURE — 21310 PR CLOSED RX NOSE FRACTURE: CPT | Performed by: STUDENT IN AN ORGANIZED HEALTH CARE EDUCATION/TRAINING PROGRAM

## 2021-07-23 PROCEDURE — 6370000000 HC RX 637 (ALT 250 FOR IP)

## 2021-07-23 PROCEDURE — 6360000002 HC RX W HCPCS: Performed by: ANESTHESIOLOGY

## 2021-07-23 PROCEDURE — 87635 SARS-COV-2 COVID-19 AMP PRB: CPT

## 2021-07-23 PROCEDURE — 2580000003 HC RX 258: Performed by: STUDENT IN AN ORGANIZED HEALTH CARE EDUCATION/TRAINING PROGRAM

## 2021-07-23 PROCEDURE — 6370000000 HC RX 637 (ALT 250 FOR IP): Performed by: STUDENT IN AN ORGANIZED HEALTH CARE EDUCATION/TRAINING PROGRAM

## 2021-07-23 PROCEDURE — 6360000002 HC RX W HCPCS

## 2021-07-23 PROCEDURE — 2709999900 HC NON-CHARGEABLE SUPPLY: Performed by: STUDENT IN AN ORGANIZED HEALTH CARE EDUCATION/TRAINING PROGRAM

## 2021-07-23 PROCEDURE — 3700000001 HC ADD 15 MINUTES (ANESTHESIA): Performed by: STUDENT IN AN ORGANIZED HEALTH CARE EDUCATION/TRAINING PROGRAM

## 2021-07-23 PROCEDURE — 7100000000 HC PACU RECOVERY - FIRST 15 MIN: Performed by: STUDENT IN AN ORGANIZED HEALTH CARE EDUCATION/TRAINING PROGRAM

## 2021-07-23 PROCEDURE — 7100000011 HC PHASE II RECOVERY - ADDTL 15 MIN: Performed by: STUDENT IN AN ORGANIZED HEALTH CARE EDUCATION/TRAINING PROGRAM

## 2021-07-23 PROCEDURE — 7100000001 HC PACU RECOVERY - ADDTL 15 MIN: Performed by: STUDENT IN AN ORGANIZED HEALTH CARE EDUCATION/TRAINING PROGRAM

## 2021-07-23 RX ORDER — MAGNESIUM HYDROXIDE 1200 MG/15ML
LIQUID ORAL CONTINUOUS PRN
Status: COMPLETED | OUTPATIENT
Start: 2021-07-23 | End: 2021-07-23

## 2021-07-23 RX ORDER — GINSENG 100 MG
CAPSULE ORAL
Status: COMPLETED | OUTPATIENT
Start: 2021-07-23 | End: 2021-07-23

## 2021-07-23 RX ORDER — LIDOCAINE HYDROCHLORIDE 40 MG/ML
SOLUTION TOPICAL PRN
Status: DISCONTINUED | OUTPATIENT
Start: 2021-07-23 | End: 2021-07-23 | Stop reason: SDUPTHER

## 2021-07-23 RX ORDER — GLYCOPYRROLATE 0.2 MG/ML
INJECTION INTRAMUSCULAR; INTRAVENOUS PRN
Status: DISCONTINUED | OUTPATIENT
Start: 2021-07-23 | End: 2021-07-23 | Stop reason: SDUPTHER

## 2021-07-23 RX ORDER — SODIUM CHLORIDE 0.9 % (FLUSH) 0.9 %
10 SYRINGE (ML) INJECTION EVERY 12 HOURS SCHEDULED
Status: DISCONTINUED | OUTPATIENT
Start: 2021-07-23 | End: 2021-07-23 | Stop reason: HOSPADM

## 2021-07-23 RX ORDER — ECHINACEA PURPUREA EXTRACT 125 MG
TABLET ORAL
Qty: 1 BOTTLE | Refills: 5 | Status: SHIPPED | OUTPATIENT
Start: 2021-07-23 | End: 2022-05-16

## 2021-07-23 RX ORDER — ONDANSETRON 2 MG/ML
4 INJECTION INTRAMUSCULAR; INTRAVENOUS
Status: DISCONTINUED | OUTPATIENT
Start: 2021-07-23 | End: 2021-07-23 | Stop reason: HOSPADM

## 2021-07-23 RX ORDER — SODIUM CHLORIDE 9 MG/ML
25 INJECTION, SOLUTION INTRAVENOUS PRN
Status: DISCONTINUED | OUTPATIENT
Start: 2021-07-23 | End: 2021-07-23 | Stop reason: HOSPADM

## 2021-07-23 RX ORDER — FENTANYL CITRATE 50 UG/ML
25 INJECTION, SOLUTION INTRAMUSCULAR; INTRAVENOUS EVERY 5 MIN PRN
Status: DISCONTINUED | OUTPATIENT
Start: 2021-07-23 | End: 2021-07-23 | Stop reason: HOSPADM

## 2021-07-23 RX ORDER — SODIUM CHLORIDE 9 MG/ML
INJECTION, SOLUTION INTRAVENOUS CONTINUOUS
Status: DISCONTINUED | OUTPATIENT
Start: 2021-07-23 | End: 2021-07-23 | Stop reason: HOSPADM

## 2021-07-23 RX ORDER — CEFAZOLIN SODIUM 2 G/50ML
2000 SOLUTION INTRAVENOUS ONCE
Status: DISCONTINUED | OUTPATIENT
Start: 2021-07-23 | End: 2021-07-23 | Stop reason: HOSPADM

## 2021-07-23 RX ORDER — PROPOFOL 10 MG/ML
INJECTION, EMULSION INTRAVENOUS PRN
Status: DISCONTINUED | OUTPATIENT
Start: 2021-07-23 | End: 2021-07-23 | Stop reason: SDUPTHER

## 2021-07-23 RX ORDER — LIDOCAINE HYDROCHLORIDE 20 MG/ML
INJECTION, SOLUTION EPIDURAL; INFILTRATION; INTRACAUDAL; PERINEURAL PRN
Status: DISCONTINUED | OUTPATIENT
Start: 2021-07-23 | End: 2021-07-23 | Stop reason: SDUPTHER

## 2021-07-23 RX ORDER — DEXAMETHASONE SODIUM PHOSPHATE 4 MG/ML
INJECTION, SOLUTION INTRA-ARTICULAR; INTRALESIONAL; INTRAMUSCULAR; INTRAVENOUS; SOFT TISSUE PRN
Status: DISCONTINUED | OUTPATIENT
Start: 2021-07-23 | End: 2021-07-23 | Stop reason: SDUPTHER

## 2021-07-23 RX ORDER — ONDANSETRON 2 MG/ML
INJECTION INTRAMUSCULAR; INTRAVENOUS PRN
Status: DISCONTINUED | OUTPATIENT
Start: 2021-07-23 | End: 2021-07-23 | Stop reason: SDUPTHER

## 2021-07-23 RX ORDER — FENTANYL CITRATE 50 UG/ML
INJECTION, SOLUTION INTRAMUSCULAR; INTRAVENOUS PRN
Status: DISCONTINUED | OUTPATIENT
Start: 2021-07-23 | End: 2021-07-23 | Stop reason: SDUPTHER

## 2021-07-23 RX ORDER — OXYMETAZOLINE HYDROCHLORIDE 0.05 G/100ML
SPRAY NASAL
Status: COMPLETED | OUTPATIENT
Start: 2021-07-23 | End: 2021-07-23

## 2021-07-23 RX ORDER — SUCCINYLCHOLINE/SOD CL,ISO/PF 200MG/10ML
SYRINGE (ML) INTRAVENOUS PRN
Status: DISCONTINUED | OUTPATIENT
Start: 2021-07-23 | End: 2021-07-23 | Stop reason: SDUPTHER

## 2021-07-23 RX ORDER — SODIUM CHLORIDE 0.9 % (FLUSH) 0.9 %
10 SYRINGE (ML) INJECTION PRN
Status: DISCONTINUED | OUTPATIENT
Start: 2021-07-23 | End: 2021-07-23 | Stop reason: HOSPADM

## 2021-07-23 RX ORDER — PROMETHAZINE HYDROCHLORIDE 25 MG/ML
6.25 INJECTION, SOLUTION INTRAMUSCULAR; INTRAVENOUS
Status: DISCONTINUED | OUTPATIENT
Start: 2021-07-23 | End: 2021-07-23 | Stop reason: HOSPADM

## 2021-07-23 RX ORDER — MIDAZOLAM HYDROCHLORIDE 1 MG/ML
INJECTION INTRAMUSCULAR; INTRAVENOUS PRN
Status: DISCONTINUED | OUTPATIENT
Start: 2021-07-23 | End: 2021-07-23 | Stop reason: SDUPTHER

## 2021-07-23 RX ADMIN — FENTANYL CITRATE 50 MCG: 50 INJECTION INTRAMUSCULAR; INTRAVENOUS at 14:17

## 2021-07-23 RX ADMIN — ONDANSETRON 4 MG: 2 INJECTION INTRAMUSCULAR; INTRAVENOUS at 14:05

## 2021-07-23 RX ADMIN — FENTANYL CITRATE 25 MCG: 50 INJECTION INTRAMUSCULAR; INTRAVENOUS at 15:07

## 2021-07-23 RX ADMIN — Medication 160 MG: at 13:58

## 2021-07-23 RX ADMIN — HYDROMORPHONE HYDROCHLORIDE 0.5 MG: 1 INJECTION, SOLUTION INTRAMUSCULAR; INTRAVENOUS; SUBCUTANEOUS at 14:46

## 2021-07-23 RX ADMIN — PROPOFOL 100 MG: 10 INJECTION, EMULSION INTRAVENOUS at 13:59

## 2021-07-23 RX ADMIN — LIDOCAINE HYDROCHLORIDE 4 ML: 40 SOLUTION TOPICAL at 14:00

## 2021-07-23 RX ADMIN — DEXAMETHASONE SODIUM PHOSPHATE 10 MG: 4 INJECTION, SOLUTION INTRAMUSCULAR; INTRAVENOUS at 14:05

## 2021-07-23 RX ADMIN — PROPOFOL 40 MG: 10 INJECTION, EMULSION INTRAVENOUS at 14:17

## 2021-07-23 RX ADMIN — MIDAZOLAM 2 MG: 1 INJECTION INTRAMUSCULAR; INTRAVENOUS at 13:53

## 2021-07-23 RX ADMIN — LIDOCAINE HYDROCHLORIDE 100 MG: 20 INJECTION, SOLUTION EPIDURAL; INFILTRATION; INTRACAUDAL; PERINEURAL at 13:58

## 2021-07-23 RX ADMIN — FENTANYL CITRATE 50 MCG: 50 INJECTION INTRAMUSCULAR; INTRAVENOUS at 14:10

## 2021-07-23 RX ADMIN — PROPOFOL 200 MG: 10 INJECTION, EMULSION INTRAVENOUS at 13:58

## 2021-07-23 RX ADMIN — HYDROMORPHONE HYDROCHLORIDE 0.5 MG: 1 INJECTION, SOLUTION INTRAMUSCULAR; INTRAVENOUS; SUBCUTANEOUS at 14:57

## 2021-07-23 RX ADMIN — SODIUM CHLORIDE: 9 INJECTION, SOLUTION INTRAVENOUS at 12:49

## 2021-07-23 RX ADMIN — GLYCOPYRROLATE 0.2 MG: 0.2 INJECTION, SOLUTION INTRAMUSCULAR; INTRAVENOUS at 14:05

## 2021-07-23 ASSESSMENT — PULMONARY FUNCTION TESTS
PIF_VALUE: 21
PIF_VALUE: 11
PIF_VALUE: 9
PIF_VALUE: 1
PIF_VALUE: 11
PIF_VALUE: 9
PIF_VALUE: 1
PIF_VALUE: 9
PIF_VALUE: 0
PIF_VALUE: 8
PIF_VALUE: 9
PIF_VALUE: 5
PIF_VALUE: 1
PIF_VALUE: 8
PIF_VALUE: 1
PIF_VALUE: 19
PIF_VALUE: 10
PIF_VALUE: 10
PIF_VALUE: 17
PIF_VALUE: 10
PIF_VALUE: 11
PIF_VALUE: 9
PIF_VALUE: 1
PIF_VALUE: 16
PIF_VALUE: 9
PIF_VALUE: 9
PIF_VALUE: 10
PIF_VALUE: 8
PIF_VALUE: 10
PIF_VALUE: 1
PIF_VALUE: 1
PIF_VALUE: 10
PIF_VALUE: 13
PIF_VALUE: 9
PIF_VALUE: 1
PIF_VALUE: 17
PIF_VALUE: 9
PIF_VALUE: 8
PIF_VALUE: 9

## 2021-07-23 ASSESSMENT — PAIN DESCRIPTION - LOCATION
LOCATION: NOSE

## 2021-07-23 ASSESSMENT — PAIN DESCRIPTION - DESCRIPTORS
DESCRIPTORS: DISCOMFORT
DESCRIPTORS: ACHING
DESCRIPTORS: ACHING;PRESSURE
DESCRIPTORS: DISCOMFORT

## 2021-07-23 ASSESSMENT — PAIN SCALES - GENERAL
PAINLEVEL_OUTOF10: 4
PAINLEVEL_OUTOF10: 8
PAINLEVEL_OUTOF10: 8
PAINLEVEL_OUTOF10: 3
PAINLEVEL_OUTOF10: 3
PAINLEVEL_OUTOF10: 4
PAINLEVEL_OUTOF10: 8

## 2021-07-23 ASSESSMENT — PAIN DESCRIPTION - PROGRESSION
CLINICAL_PROGRESSION: GRADUALLY IMPROVING
CLINICAL_PROGRESSION: GRADUALLY IMPROVING

## 2021-07-23 ASSESSMENT — PAIN - FUNCTIONAL ASSESSMENT
PAIN_FUNCTIONAL_ASSESSMENT: ACTIVITIES ARE NOT PREVENTED
PAIN_FUNCTIONAL_ASSESSMENT: 0-10

## 2021-07-23 ASSESSMENT — PAIN DESCRIPTION - ORIENTATION
ORIENTATION: MID

## 2021-07-23 ASSESSMENT — PAIN DESCRIPTION - PAIN TYPE
TYPE: SURGICAL PAIN

## 2021-07-23 ASSESSMENT — PAIN DESCRIPTION - FREQUENCY
FREQUENCY: CONTINUOUS
FREQUENCY: CONTINUOUS

## 2021-07-23 ASSESSMENT — PAIN DESCRIPTION - ONSET
ONSET: ON-GOING
ONSET: ON-GOING

## 2021-07-23 NOTE — PROGRESS NOTES
Vss. Dressing remains clean dry intact. Tolerated sitting up and po fluids well. Wife at bedside. Verbalized understanding of discharge instructions. Refusing analgesic. Appears comfortable.

## 2021-07-23 NOTE — PROGRESS NOTES
From PACU. Alert and oriented. C/o 3/10 surgical pain that is tolerable. Refusing analgesic.  Dressing is clean dry intact. vss.

## 2021-07-23 NOTE — ANESTHESIA POSTPROCEDURE EVALUATION
Department of Anesthesiology  Postprocedure Note    Patient: Jose Quinones  MRN: [id number]  YOB: 1985  Date of evaluation: 7/23/2021  Time:  3:34 PM     Procedure Summary     Date: 07/23/21 Room / Location: [address]    Anesthesia Start: 6878 Anesthesia Stop: 0986    Procedure: CLOSED REDUCTION OF NASAL BONE FRACTURE (N/A Nose) Diagnosis: (NASAL BONE FRACTURE, SEPTAL FRACTURE)    Surgeons: Capri Padilla MD Responsible Provider: Mando Muñoz MD    Anesthesia Type: general ASA Status: 3          Anesthesia Type: general    Anthony Phase I: Anthony Score: 10    Anthony Phase II: Anthony Score: 10    Last vitals: Reviewed and per EMR flowsheets.        Anesthesia Post Evaluation    Patient location during evaluation: PACU  Patient participation: complete - patient participated  Level of consciousness: awake and alert  Airway patency: patent  Nausea & Vomiting: no nausea and no vomiting  Complications: no  Cardiovascular status: hemodynamically stable  Respiratory status: acceptable  Hydration status: stable

## 2021-07-23 NOTE — H&P
evidence of skin lesions/tumors  Neuro:  cranial nerve II-XII intact; normal gait  Cardio:  no edema        RADIOLOGY  Summary of findings:  I reviewed the CT scan. The patient has displaced nasal bone fractures as well as septal fractures. No other maxillofacial fractures noted      ASSESSMENT/PLAN  1. Closed fracture of nasal bone, initial encounter  Risk benefits and alternatives to the procedure were discussed with the patient in detail. Specifically we discussed the risk of persistent cosmetic deformity, persistent nasal airway obstruction, septal hematoma, bruising, CSF leak, decreased or loss of smell, injury to the teeth lips or gums or face, orbital complications and other very rare potential complications. Discussed that if he has persistent nasal obstruction he may need a formal open septorhinoplasty to address this in the future. We also discussed we may place intranasal splints or nasal dorsal splint at the time of surgery. 2. Closed fracture of nasal septum, initial encounter  As above    3. Nasal congestion  Secondary to the septal and nasal bone fracture  - BASIC METABOLIC PANEL; Future             I have performed a head and neck physical exam personally or was physically present during the key or critical portions of the service. This note was generated completely or in part utilizing Dragon dictation speech recognition software. Occasionally, words are mistranscribed and despite editing, the text may contain inaccuracies due to incorrect word recognition. If further clarification is needed please contact the office at [phone].

## 2021-07-23 NOTE — ANESTHESIA PRE PROCEDURE
Department of Anesthesiology  Preprocedure Note       Name:  Emmy Cruz   Age:  43 y.o.  :  1985                                          MRN:  [id number]         Date:  2021      Surgeon: Gilberto Rodriguez):  MD Jessica Stratton MD    Procedure: Procedure(s):  CLOSED REDUCTION OF NASAL BONE FRACTURE  POSSIBLE SEPTOPLASTY    Medications prior to admission:   Prior to Admission medications    Medication Sig Start Date End Date Taking?  Authorizing Provider   traZODone (DESYREL) 100 MG tablet Take 100 mg by mouth nightly as needed for Sleep   Yes Historical Provider, MD   ibuprofen (MOTRIN IB) 200 MG tablet Take 200 mg by mouth every 6 hours as needed for Pain   Yes Historical Provider, MD   cefadroxil (DURICEF) 500 MG capsule Take 1 capsule by mouth 2 times daily for 10 days 21 Yes Giorgio Nath MD   sertraline (ZOLOFT) 50 MG tablet Take 1 tablet by mouth daily  Patient taking differently: 100 mg Take 1 and 1/2 (one half tab) by mouth daily 3/9/20  Yes Lucas Laughlin MD   flecainide (TAMBOCOR) 50 MG tablet Take 50 mg by mouth 2 times daily  10/31/19  Yes Historical Provider, MD   lisinopril (PRINIVIL;ZESTRIL) 20 MG tablet Take 20 mg by mouth daily  19 Yes Historical Provider, MD   diltiazem (CARDIZEM CD) 180 MG extended release capsule Take 1 capsule by mouth daily  Patient taking differently: Take 360 mg by mouth daily  19  Yes Hardeep Curry MD       Current medications:    Current Facility-Administered Medications   Medication Dose Route Frequency Provider Last Rate Last Admin    ceFAZolin (ANCEF) 2000 mg in dextrose 5 % 100 mL IVPB  2,000 mg Intravenous Once Mayank Merritt MD        0.9 % sodium chloride infusion   Intravenous Continuous Jenn Ayala MD        sodium chloride flush 0.9 % injection 10 mL  10 mL Intravenous 2 times per day Jenn Ayala MD        sodium chloride flush 0.9 % injection 10 mL  10 mL Intravenous PRN Jenn Ayala MD  0.9 % sodium chloride infusion  25 mL Intravenous PRN Krystyna Shah MD           Allergies: Allergies   Allergen Reactions    Naproxen Rash    Nickel Rash     Pt develops rash when nickel buckle comes in contact with skin. Problem List:    Patient Active Problem List   Diagnosis Code    Mixed hyperlipidemia E78.2    Right upper quadrant abdominal pain R10.11    Fatty liver K76.0    Septic prepatellar bursitis of left knee M71.162    Prepatellar bursitis of left knee M70.42    [facility] T50.902A    Major depressive disorder, single episode, severe ([facility] F32.2    Alcohol use disorder, mild, abuse F10.10    Intentional ibuprofen overdose ([facility] T39.312A    MDD (major depressive disorder), single episode F32.9       Past Medical History:        Diagnosis Date    Anxiety     Hypertension     Paroxysmal atrial fibrillation (HCC)        Past Surgical History:        Procedure Laterality Date    ENDOSCOPY, COLON, DIAGNOSTIC      KNEE ARTHROSCOPY Left     MOUTH SURGERY      tooth removed       Social History:    Social History     Tobacco Use    Smoking status: Never Smoker    Smokeless tobacco: Current User     Types: Snuff    Tobacco comment: dip   Substance Use Topics    Alcohol use: Yes     Alcohol/week: 8.0 - 12.0 standard drinks     Types: 8 - 12 Cans of beer per week     Comment: 8-12 beers 5 days/week                                Ready to quit: Not Answered  Counseling given: Not Answered  Comment: dip      Vital Signs (Current): There were no vitals filed for this visit.                                            BP Readings from Last 3 Encounters:   07/22/21 126/84   07/20/21 120/74   02/17/21 114/82       NPO Status: Time of last liquid consumption: 2300                        Time of last solid consumption: 2000                        Date of last liquid consumption: 07/22/21                        Date of last solid food consumption: 07/22/21    BMI:   Wt Readings from Last 3 Encounters:   07/22/21 253 lb (114.8 kg)   07/20/21 262 lb 5.6 oz (119 kg)   02/17/21 260 lb 6.4 oz (118.1 kg)     There is no height or weight on file to calculate BMI.    CBC:   Lab Results   Component Value Date    WBC 13.4 07/20/2021    RBC 4.70 07/20/2021    HGB 14.6 07/20/2021    HCT 43.7 07/20/2021    MCV 93.1 07/20/2021    RDW 11.8 07/20/2021     07/20/2021       CMP:   Lab Results   Component Value Date     07/22/2021    K 4.8 07/22/2021    K 3.8 07/20/2021    CL 99 07/22/2021    CO2 27 07/22/2021    BUN 8 07/22/2021    CREATININE 0.8 07/22/2021    GFRAA >60 07/22/2021    AGRATIO 1.2 07/20/2021    LABGLOM >60 07/22/2021    GLUCOSE 187 07/22/2021    PROT 7.1 07/20/2021    CALCIUM 9.3 07/22/2021    BILITOT 1.2 07/20/2021    ALKPHOS 122 07/20/2021     07/20/2021    ALT 90 07/20/2021       POC Tests: No results for input(s): POCGLU, POCNA, POCK, POCCL, POCBUN, POCHEMO, POCHCT in the last 72 hours.     Coags: No results found for: PROTIME, INR, APTT    HCG (If Applicable): No results found for: PREGTESTUR, PREGSERUM, HCG, HCGQUANT     ABGs: No results found for: PHART, PO2ART, KJY8YRP, WTL7BOV, BEART, V5YQBUAX     Type & Screen (If Applicable):  No results found for: LABABO, LABRH    Drug/Infectious Status (If Applicable):  No results found for: HIV, HEPCAB    COVID-19 Screening (If Applicable): No results found for: COVID19        Anesthesia Evaluation  Patient summary reviewed and Nursing notes reviewed  Airway: Mallampati: II  TM distance: >3 FB   Neck ROM: full  Mouth opening: > = 3 FB Dental:          Pulmonary: breath sounds clear to auscultation                            ROS comment: Had rib fracture few weeks back  No xray  No SOB now   Cardiovascular:  Exercise tolerance: good (>4 METS),   (+) hypertension:, dysrhythmias: atrial fibrillation,       ECG reviewed  Rhythm: regular  Rate: normal                    Neuro/Psych:   (+) psychiatric history:depression/anxiety              ROS comment: Alcohol abuse  Fall following syncope ? Hyponatremia  Seen in ER  No neurological symptoms now GI/Hepatic/Renal:             Endo/Other:                     Abdominal:             Vascular: Other Findings:           Anesthesia Plan      general     ASA 3       Induction: intravenous. Anesthetic plan and risks discussed with patient. Plan discussed with CRNA.     Attending anesthesiologist reviewed and agrees with Preprocedure content              Yakov Bacon MD   7/23/2021

## 2021-07-26 NOTE — OP NOTE
Operative Note      Patient: Hines Hatchet  YOB: 1985  MRN: [id number]    Date of Procedure: 7/23/2021    Pre-Op Diagnosis:   1. Facial trauma  2. Comminuted and displaced nasal bone and septal fracture  3. Nasal airway obstruction  4. Inferior turbinate hypertrophy  5. epistaxis    Post-Op Diagnosis: Same       Procedure(s):  1. Closed reduction of nasal bone fracture  2. Closed reduction of nasal septal fracture  3. Inferior turbinate outfracture    Surgeon(s):  Nany Milligan MD    Assistant:   Surgical Assistant: Pao Reynoso    Anesthesia: General    Estimated Blood Loss (mL): Minimal    Complications: None    Specimens:   * No specimens in log *    Implants:  * No implants in log *      Drains: * No LDAs found *    Findings:   1. Nasal airway obstruction due to rightward displaced nasal septal fracture and inferior turbinate hypertrophy  2. Excellent reduction of depressed right nasal bone fracture with improvement in straightness of nasal dorsum. 3.  Closed reduction of nasal septal fracture and inferior turbinate outfracture performed to increase nasal airways bilaterally. Asher splints were put in place to allow the septum to heal and to its normal position. Detailed Description of Procedure:   After explanation of the risk benefits and alternatives, the patient decided to proceed with surgery to address his nasal airway obstruction and nasal bone fracture. The risks including persistent nasal airway obstruction, persistent cosmetic deformity need for further interventions among others were discussed in detail. The patient was brought to the operating room and general anesthesia was obtained via a endotracheal intubation. The patient was prepped and draped in standard sterile fashion. A timeout was performed verifying correct patient and procedure.     The [facility] elevator was then taken and the distance between the medial canthus and the nasal opening was measured to avoid any inadvertent skull base or overall injury. A [place] elevator was then inserted into the bilateral nasal cavities and utilized to perform a closed reduction of his nasal bone fractures. There is a significant depression of the right nasal bone and this was lifted back into its anatomic position with immediate improvement in the appearance of the nasal dorsum. There was evidence of nasal septal fracture on both my examination as well as on radiographic imaging. As this was bowing significantly into the right nasal passageway and causing obstruction the decision was made to perform a closed reduction of the nasal bone fracture. A large grasping instrument was inserted into the bilateral nasal cavities and utilized to fracture the nasal septum back into midline. The [facility] elevator was inserted on both sides and used to further reduce it into its anatomic position. I then performed a bilateral inferior turbinate outfracture with the [facility]. This was to further improve his nasal airway passages as he had significant obstruction leading up to his injury and was very symptomatic. Once this was completed, and Aquaplast splint was placed over the nasal dorsum. Asher splints were then sutured into place. The patient was then handed back to anesthesia where he awoke without difficulty. No complications.     Electronically signed by Tosha Lama MD on 7/26/2021 at 11:08 AM

## 2021-07-29 ENCOUNTER — OFFICE VISIT (OUTPATIENT)
Dept: ENT CLINIC | Age: 36
End: 2021-07-29
Payer: COMMERCIAL

## 2021-07-29 VITALS — HEIGHT: 74 IN | WEIGHT: 258 LBS | BODY MASS INDEX: 33.11 KG/M2

## 2021-07-29 DIAGNOSIS — R09.81 NASAL CONGESTION: ICD-10-CM

## 2021-07-29 DIAGNOSIS — S02.2XXD CLOSED FRACTURE OF NASAL SEPTUM WITH ROUTINE HEALING, SUBSEQUENT ENCOUNTER: Primary | ICD-10-CM

## 2021-07-29 DIAGNOSIS — S02.2XXD CLOSED FRACTURE OF NASAL BONE WITH ROUTINE HEALING, SUBSEQUENT ENCOUNTER: ICD-10-CM

## 2021-07-29 DIAGNOSIS — J31.0 CHRONIC RHINITIS: ICD-10-CM

## 2021-07-29 PROCEDURE — 99024 POSTOP FOLLOW-UP VISIT: CPT | Performed by: STUDENT IN AN ORGANIZED HEALTH CARE EDUCATION/TRAINING PROGRAM

## 2021-07-29 PROCEDURE — 31231 NASAL ENDOSCOPY DX: CPT | Performed by: STUDENT IN AN ORGANIZED HEALTH CARE EDUCATION/TRAINING PROGRAM

## 2021-07-29 NOTE — PROGRESS NOTES
Grey Dorman (:  1985) is a 43 y.o. male, here for evaluation of the following chief complaint(s):  Post-Op Check (1st post op s/p closed reduction of nasal bone fracture )      ASSESSMENT/PLAN:  1. Closed fracture of nasal septum, initial encounter  2. Closed fracture of nasal bone, initial encounter  3. Nasal congestion      This is a very pleasant 43 y.o. male here today for evaluation of the the above-noted complaints. This is a patient's first follow-up after undergoing closed reduction of his right nasal bone fracture and closed reduction of his nasal septal fracture. His splints were removed today in clinic and nasal endoscopy revealed a widely patent nasal airway on both the left and the right. There was still a small portion of deviated septum high on the right. His nasal dorsum appeared midline and there was a good cosmetic outcome after the reduction. I did discuss with the patient that it is possible he could develop some nasal congestion down the road if the nasal septal fractures slips out of place after the reduction. I have asked him to follow-up with me in 3 months so we can reevaluate his nasal airway breathing. I have asked the patient to use nasal saline his nose going forward. He can finish his antibiotics. He is cleared to return to work should he so desire. SUBJECTIVE/OBJECTIVE:  HPI    This is a patient's first follow-up after undergoing closed reduction of nasal bone fracture nasal septal fracture. He has been getting some intermittent nasal drainage which is clear. He is not having any bleeding. He is getting some crusting out of his nose. He has not been irrigating.     REVIEW OF SYSTEMS  The following systems were reviewed and revealed the following in addition to any already discussed in the HPI:    PHYSICAL EXAM    GENERAL: No acute distress, alert and oriented, no hoarseness, score = 1 (0 = no discharge, 1 = clear thin discharge, 2 = thick purulent discharge)    Left Nasal Cavity and Paranasal Sinuses:    Polyp score = 0 (0 = no polyps, 1 = small polyps in middle meatus not reaching below the inferior border of the middle katie, 2 = polyps reaching below the middle border of the middle turbinate, 3= large polyps reaching the lower border of the inferior turbinate or polyps medial to the middle katie, 4= large polyps causing almost complete congestion/obstruction of the interior meatus)  Edema score = 1 (0 = absent, 1 = mild, 2 = severe)  Discharge score = 1 (0 = no discharge, 1 = clear thin discharge, 2 = thick purulent discharge)    Septum: intact and deviated minimally to the right  Other:   -The inferior and middle turbinates were examined. The middle meatus, and sphenoethmoid recess was examined bilaterally. -Good reduction of the patient's closed septal fracture. There is no evidence of septal hematoma.   -There were no complications. Tolerated well without complication. I attest that I was present for and did the entire procedure myself. This note was generated completely or in part utilizing Dragon dictation speech recognition software. Occasionally, words are mistranscribed and despite editing, the text may contain inaccuracies due to incorrect word recognition. If further clarification is needed please contact the office at [phone]. An electronic signature was used to authenticate this note.     --Juan Carlos Presley MD

## 2022-04-05 ENCOUNTER — OFFICE VISIT (OUTPATIENT)
Dept: ENT CLINIC | Age: 37
End: 2022-04-05
Payer: COMMERCIAL

## 2022-04-05 VITALS
HEART RATE: 76 BPM | WEIGHT: 246.8 LBS | SYSTOLIC BLOOD PRESSURE: 128 MMHG | BODY MASS INDEX: 31.67 KG/M2 | DIASTOLIC BLOOD PRESSURE: 83 MMHG | HEIGHT: 74 IN

## 2022-04-05 DIAGNOSIS — J34.89 NASAL OBSTRUCTION: ICD-10-CM

## 2022-04-05 DIAGNOSIS — R09.81 NASAL CONGESTION: ICD-10-CM

## 2022-04-05 DIAGNOSIS — J34.2 DEVIATED NASAL SEPTUM: ICD-10-CM

## 2022-04-05 DIAGNOSIS — R04.0 EPISTAXIS: Primary | ICD-10-CM

## 2022-04-05 PROCEDURE — 31231 NASAL ENDOSCOPY DX: CPT | Performed by: STUDENT IN AN ORGANIZED HEALTH CARE EDUCATION/TRAINING PROGRAM

## 2022-04-05 PROCEDURE — 99213 OFFICE O/P EST LOW 20 MIN: CPT | Performed by: STUDENT IN AN ORGANIZED HEALTH CARE EDUCATION/TRAINING PROGRAM

## 2022-04-05 NOTE — PROGRESS NOTES
Grey Schmidt B Dandy (:  1985) is a 43 y.o. male, here for evaluation of the following chief complaint(s): Other (Pt having nosebleeds. Not currently having one.)      ASSESSMENT/PLAN:  1. Epistaxis  2. Deviated nasal septum  3. Nasal congestion  4. Nasal obstruction      This is a very pleasant 43 y.o. male here today for evaluation of the the above-noted complaints. Patient previously underwent close reduction of a nasal septal fracture and nasal bone fracture in 2021. Today he has a new issue related to epistaxis in the setting of recent Eliquis usage for a cardiac arrhythmia. We discussed the treatment of epistaxis in detail. He has a lot of crusting along the anterior nasal septum and I would like to start him on mupirocin ointment twice daily to the bilateral naris. We discussed that depending on how he responds to treatment, we may need to consider a procedure to address his epistaxis. We discussed the relationship between his antiplatelet therapy and exacerbation of his symptoms. Following handout was provided to the patient discussing this. I will see him back in 1 month. If he does not have improvement then we will discuss intervention. Stop using nasal steroid sprays as this can precipitate and worsen epistaxis. --Vaseline inside of both nostrils along the nasal septum twice daily for the next 10 days  Frequent use of nasal saline gel sprays (preferred) or nasal saline mist multiple times a day in the nose  Consider humidification in the bedroom to help with moisturization of the nose  Avoid nasal trauma including nose picking, harsh nasal blowing, and rubbing nose after blowing.   If you develop a bleed, gently blow your nose to clear out any blood clots and then apply multiple sprays of Afrin or oxymetazoline to both nostrils (this helps constrict the blood vessels in the nose and stop bleeding). Apply firm pressure on the soft part of the nose for 15 minutes. If bleeding persists after this repeat this 2 times, and if there is still bleeding call our office or go to the emergency department for further evaluation. SUBJECTIVE/OBJECTIVE:  HPI    This is a patient's first follow-up after undergoing closed reduction of nasal bone fracture nasal septal fracture. He has been getting some intermittent nasal drainage which is clear. He is not having any bleeding. He is getting some crusting out of his nose. He has not been irrigating. Update 4/5/2022:    Patient presents today for follow-up regarding issues related to epistaxis, nasal congestion nasal obstruction. This is new for him. He was recently diagnosed with an arrhythmia and started on Eliquis. Since then he has been having persistent nosebleeds mainly on the left. He is usually able to get this controlled at home but sometimes will bleed really heavily    REVIEW OF SYSTEMS  The following systems were reviewed and revealed the following in addition to any already discussed in the HPI:    PHYSICAL EXAM    GENERAL: No acute distress, alert and oriented, no hoarseness, strong voice  EYES: EOMI, Anti-icteric  HENT:   Head: Normocephalic and atraumatic. Face:  Symmetric, facial nerve intact, no sinus tenderness  Right Ear: Normal external ear, normal external auditory canal, intact tympanic membrane with normal mobility and aerated middle ear  Left Ear: Normal external ear, normal external auditory canal, intact tympanic membrane with normal mobility and aerated middle ear        PROCEDURE  Nasal Endoscopy (CPT code 99369)    Preop: chronic rhinitis, epistaxis  Postop: Same    Verbal consent was received. After topical anesthesia and decongestion had been obtained using aerosolized 1% lidocaine and oxymetazoline, a 45 degree rigid endoscope was placed into both nares with the patient in a sitting position.  The following was observed:    Right Nasal Cavity and Paranasal Sinuses:  Polyp score = 0 (0 = no polyps, 1 = small polyps in middle meatus not reaching below the inferior border of the middle katie, 2 = polyps reaching below the middle border of the middle turbinate, 3= large polyps reaching the lower border of the inferior turbinate or polyps medial to the middle katie, 4= large polyps causing almost complete congestion/obstruction of the interior meatus)  Edema score = 1 (0 = absent, 1 = mild, 2 = severe)  Discharge score = 1 (0 = no discharge, 1 = clear thin discharge, 2 = thick purulent discharge)    Left Nasal Cavity and Paranasal Sinuses:    Polyp score = 0 (0 = no polyps, 1 = small polyps in middle meatus not reaching below the inferior border of the middle katie, 2 = polyps reaching below the middle border of the middle turbinate, 3= large polyps reaching the lower border of the inferior turbinate or polyps medial to the middle katie, 4= large polyps causing almost complete congestion/obstruction of the interior meatus)  Edema score = 1 (0 = absent, 1 = mild, 2 = severe)  Discharge score = 1 (0 = no discharge, 1 = clear thin discharge, 2 = thick purulent discharge)    Septum: intact and deviated minimally to the right  Other:   -The inferior and middle turbinates were examined. The middle meatus, and sphenoethmoid recess was examined bilaterally. -Months of significant bilateral nasal crusting with some old dried blood.  -There were no complications. Tolerated well without complication. I attest that I was present for and did the entire procedure myself. This note was generated completely or in part utilizing Dragon dictation speech recognition software. Occasionally, words are mistranscribed and despite editing, the text may contain inaccuracies due to incorrect word recognition. If further clarification is needed please contact the office at [phone].     An electronic signature was used to authenticate this note.     --Hue Gandhi MD

## 2022-05-16 ENCOUNTER — HOSPITAL ENCOUNTER (INPATIENT)
Age: 37
LOS: 2 days | Discharge: HOME | DRG: 854 | End: 2022-05-18
Attending: STUDENT IN AN ORGANIZED HEALTH CARE EDUCATION/TRAINING PROGRAM | Admitting: INTERNAL MEDICINE
Payer: COMMERCIAL

## 2022-05-16 ENCOUNTER — APPOINTMENT (OUTPATIENT)
Dept: GENERAL RADIOLOGY | Age: 37
DRG: 854 | End: 2022-05-16
Payer: COMMERCIAL

## 2022-05-16 DIAGNOSIS — Z78.9 FAILURE OF OUTPATIENT TREATMENT: ICD-10-CM

## 2022-05-16 DIAGNOSIS — L08.9 TOE INFECTION: Primary | ICD-10-CM

## 2022-05-16 DIAGNOSIS — I48.91 ATRIAL FIBRILLATION WITH RAPID VENTRICULAR RESPONSE (HCC): ICD-10-CM

## 2022-05-16 DIAGNOSIS — R79.89 ELEVATED LFTS: ICD-10-CM

## 2022-05-16 DIAGNOSIS — A41.9 SEPTICEMIA (HCC): ICD-10-CM

## 2022-05-16 LAB
A/G RATIO: 1.3 (ref 1.1–2.2)
ALBUMIN SERPL-MCNC: 4.4 G/DL (ref 3.4–5)
ALP BLD-CCNC: 156 U/L (ref 40–129)
ALT SERPL-CCNC: 60 U/L (ref 10–40)
ANION GAP SERPL CALCULATED.3IONS-SCNC: 17 MMOL/L (ref 3–16)
AST SERPL-CCNC: 71 U/L (ref 15–37)
BASOPHILS ABSOLUTE: 0.1 K/UL (ref 0–0.2)
BASOPHILS RELATIVE PERCENT: 1 %
BILIRUB SERPL-MCNC: 0.7 MG/DL (ref 0–1)
BUN BLDV-MCNC: 7 MG/DL (ref 7–20)
CALCIUM SERPL-MCNC: 9.6 MG/DL (ref 8.3–10.6)
CHLORIDE BLD-SCNC: 94 MMOL/L (ref 99–110)
CO2: 21 MMOL/L (ref 21–32)
CREAT SERPL-MCNC: 0.9 MG/DL (ref 0.9–1.3)
EOSINOPHILS ABSOLUTE: 0.1 K/UL (ref 0–0.6)
EOSINOPHILS RELATIVE PERCENT: 0.6 %
ETHANOL: NORMAL MG/DL (ref 0–0.08)
GFR AFRICAN AMERICAN: >60
GFR NON-AFRICAN AMERICAN: >60
GLUCOSE BLD-MCNC: 193 MG/DL (ref 70–99)
HCT VFR BLD CALC: 45.8 % (ref 40.5–52.5)
HEMOGLOBIN: 16 G/DL (ref 13.5–17.5)
LACTIC ACID, SEPSIS: 3.2 MMOL/L (ref 0.4–1.9)
LACTIC ACID, SEPSIS: 3.6 MMOL/L (ref 0.4–1.9)
LYMPHOCYTES ABSOLUTE: 1.9 K/UL (ref 1–5.1)
LYMPHOCYTES RELATIVE PERCENT: 17.3 %
MCH RBC QN AUTO: 32.4 PG (ref 26–34)
MCHC RBC AUTO-ENTMCNC: 34.8 G/DL (ref 31–36)
MCV RBC AUTO: 93.1 FL (ref 80–100)
MONOCYTES ABSOLUTE: 0.7 K/UL (ref 0–1.3)
MONOCYTES RELATIVE PERCENT: 6.7 %
NEUTROPHILS ABSOLUTE: 8.2 K/UL (ref 1.7–7.7)
NEUTROPHILS RELATIVE PERCENT: 74.4 %
PDW BLD-RTO: 12.4 % (ref 12.4–15.4)
PLATELET # BLD: 258 K/UL (ref 135–450)
PMV BLD AUTO: 7.4 FL (ref 5–10.5)
POTASSIUM REFLEX MAGNESIUM: 3.8 MMOL/L (ref 3.5–5.1)
RBC # BLD: 4.92 M/UL (ref 4.2–5.9)
SODIUM BLD-SCNC: 132 MMOL/L (ref 136–145)
TOTAL PROTEIN: 7.9 G/DL (ref 6.4–8.2)
WBC # BLD: 11 K/UL (ref 4–11)

## 2022-05-16 PROCEDURE — 2580000003 HC RX 258: Performed by: INTERNAL MEDICINE

## 2022-05-16 PROCEDURE — 83605 ASSAY OF LACTIC ACID: CPT

## 2022-05-16 PROCEDURE — 6370000000 HC RX 637 (ALT 250 FOR IP): Performed by: INTERNAL MEDICINE

## 2022-05-16 PROCEDURE — 96375 TX/PRO/DX INJ NEW DRUG ADDON: CPT

## 2022-05-16 PROCEDURE — 96365 THER/PROPH/DIAG IV INF INIT: CPT

## 2022-05-16 PROCEDURE — 2580000003 HC RX 258: Performed by: PHYSICIAN ASSISTANT

## 2022-05-16 PROCEDURE — 1200000000 HC SEMI PRIVATE

## 2022-05-16 PROCEDURE — 6360000002 HC RX W HCPCS: Performed by: PHYSICIAN ASSISTANT

## 2022-05-16 PROCEDURE — 87040 BLOOD CULTURE FOR BACTERIA: CPT

## 2022-05-16 PROCEDURE — 99285 EMERGENCY DEPT VISIT HI MDM: CPT

## 2022-05-16 PROCEDURE — 6360000002 HC RX W HCPCS: Performed by: INTERNAL MEDICINE

## 2022-05-16 PROCEDURE — 73660 X-RAY EXAM OF TOE(S): CPT

## 2022-05-16 PROCEDURE — 80053 COMPREHEN METABOLIC PANEL: CPT

## 2022-05-16 PROCEDURE — 85025 COMPLETE CBC W/AUTO DIFF WBC: CPT

## 2022-05-16 PROCEDURE — 36415 COLL VENOUS BLD VENIPUNCTURE: CPT

## 2022-05-16 PROCEDURE — 82077 ASSAY SPEC XCP UR&BREATH IA: CPT

## 2022-05-16 RX ORDER — SERTRALINE HYDROCHLORIDE 100 MG/1
100 TABLET, FILM COATED ORAL DAILY
COMMUNITY

## 2022-05-16 RX ORDER — SODIUM CHLORIDE 9 MG/ML
INJECTION, SOLUTION INTRAVENOUS PRN
Status: DISCONTINUED | OUTPATIENT
Start: 2022-05-16 | End: 2022-05-16 | Stop reason: SDUPTHER

## 2022-05-16 RX ORDER — SODIUM CHLORIDE 0.9 % (FLUSH) 0.9 %
5-40 SYRINGE (ML) INJECTION EVERY 12 HOURS SCHEDULED
Status: DISCONTINUED | OUTPATIENT
Start: 2022-05-16 | End: 2022-05-16 | Stop reason: SDUPTHER

## 2022-05-16 RX ORDER — SODIUM CHLORIDE 9 MG/ML
INJECTION, SOLUTION INTRAVENOUS PRN
Status: DISCONTINUED | OUTPATIENT
Start: 2022-05-16 | End: 2022-05-18 | Stop reason: HOSPADM

## 2022-05-16 RX ORDER — ACETAMINOPHEN 325 MG/1
650 TABLET ORAL EVERY 6 HOURS PRN
Status: DISCONTINUED | OUTPATIENT
Start: 2022-05-16 | End: 2022-05-18 | Stop reason: HOSPADM

## 2022-05-16 RX ORDER — LORAZEPAM 2 MG/ML
3 INJECTION INTRAMUSCULAR
Status: DISCONTINUED | OUTPATIENT
Start: 2022-05-16 | End: 2022-05-18 | Stop reason: HOSPADM

## 2022-05-16 RX ORDER — LORAZEPAM 1 MG/1
1 TABLET ORAL
Status: DISCONTINUED | OUTPATIENT
Start: 2022-05-16 | End: 2022-05-18 | Stop reason: HOSPADM

## 2022-05-16 RX ORDER — LORAZEPAM 2 MG/ML
2 INJECTION INTRAMUSCULAR
Status: DISCONTINUED | OUTPATIENT
Start: 2022-05-16 | End: 2022-05-18 | Stop reason: HOSPADM

## 2022-05-16 RX ORDER — SODIUM CHLORIDE 0.9 % (FLUSH) 0.9 %
5-40 SYRINGE (ML) INJECTION PRN
Status: DISCONTINUED | OUTPATIENT
Start: 2022-05-16 | End: 2022-05-16 | Stop reason: SDUPTHER

## 2022-05-16 RX ORDER — LORAZEPAM 1 MG/1
3 TABLET ORAL
Status: DISCONTINUED | OUTPATIENT
Start: 2022-05-16 | End: 2022-05-18 | Stop reason: HOSPADM

## 2022-05-16 RX ORDER — ACETAMINOPHEN 650 MG/1
650 SUPPOSITORY RECTAL EVERY 6 HOURS PRN
Status: DISCONTINUED | OUTPATIENT
Start: 2022-05-16 | End: 2022-05-18 | Stop reason: HOSPADM

## 2022-05-16 RX ORDER — 0.9 % SODIUM CHLORIDE 0.9 %
1000 INTRAVENOUS SOLUTION INTRAVENOUS ONCE
Status: COMPLETED | OUTPATIENT
Start: 2022-05-16 | End: 2022-05-16

## 2022-05-16 RX ORDER — ONDANSETRON 2 MG/ML
4 INJECTION INTRAMUSCULAR; INTRAVENOUS EVERY 6 HOURS PRN
Status: DISCONTINUED | OUTPATIENT
Start: 2022-05-16 | End: 2022-05-18 | Stop reason: HOSPADM

## 2022-05-16 RX ORDER — OXYCODONE HYDROCHLORIDE AND ACETAMINOPHEN 5; 325 MG/1; MG/1
1 TABLET ORAL EVERY 4 HOURS PRN
Status: DISCONTINUED | OUTPATIENT
Start: 2022-05-16 | End: 2022-05-18 | Stop reason: HOSPADM

## 2022-05-16 RX ORDER — LORAZEPAM 2 MG/ML
1 INJECTION INTRAMUSCULAR ONCE
Status: COMPLETED | OUTPATIENT
Start: 2022-05-16 | End: 2022-05-16

## 2022-05-16 RX ORDER — DILTIAZEM HYDROCHLORIDE 180 MG/1
180 CAPSULE, COATED, EXTENDED RELEASE ORAL DAILY
COMMUNITY

## 2022-05-16 RX ORDER — 0.9 % SODIUM CHLORIDE 0.9 %
1500 INTRAVENOUS SOLUTION INTRAVENOUS ONCE
Status: COMPLETED | OUTPATIENT
Start: 2022-05-16 | End: 2022-05-16

## 2022-05-16 RX ORDER — GAUZE BANDAGE 2" X 2"
100 BANDAGE TOPICAL DAILY
Status: DISCONTINUED | OUTPATIENT
Start: 2022-05-16 | End: 2022-05-18 | Stop reason: HOSPADM

## 2022-05-16 RX ORDER — SODIUM CHLORIDE 0.9 % (FLUSH) 0.9 %
5-40 SYRINGE (ML) INJECTION PRN
Status: DISCONTINUED | OUTPATIENT
Start: 2022-05-16 | End: 2022-05-18 | Stop reason: HOSPADM

## 2022-05-16 RX ORDER — SODIUM CHLORIDE 0.9 % (FLUSH) 0.9 %
5-40 SYRINGE (ML) INJECTION EVERY 12 HOURS SCHEDULED
Status: DISCONTINUED | OUTPATIENT
Start: 2022-05-16 | End: 2022-05-18 | Stop reason: HOSPADM

## 2022-05-16 RX ORDER — TRAZODONE HYDROCHLORIDE 100 MG/1
100 TABLET ORAL NIGHTLY PRN
Status: DISCONTINUED | OUTPATIENT
Start: 2022-05-16 | End: 2022-05-18 | Stop reason: HOSPADM

## 2022-05-16 RX ORDER — MULTIVITAMIN WITH IRON
1 TABLET ORAL DAILY
Status: DISCONTINUED | OUTPATIENT
Start: 2022-05-16 | End: 2022-05-18 | Stop reason: HOSPADM

## 2022-05-16 RX ORDER — POLYETHYLENE GLYCOL 3350 17 G/17G
17 POWDER, FOR SOLUTION ORAL DAILY PRN
Status: DISCONTINUED | OUTPATIENT
Start: 2022-05-16 | End: 2022-05-18 | Stop reason: HOSPADM

## 2022-05-16 RX ORDER — CLONIDINE HYDROCHLORIDE 0.1 MG/1
0.1 TABLET ORAL EVERY 4 HOURS PRN
Status: DISCONTINUED | OUTPATIENT
Start: 2022-05-16 | End: 2022-05-18 | Stop reason: HOSPADM

## 2022-05-16 RX ORDER — SERTRALINE HYDROCHLORIDE 100 MG/1
100 TABLET, FILM COATED ORAL DAILY
Status: DISCONTINUED | OUTPATIENT
Start: 2022-05-17 | End: 2022-05-18 | Stop reason: HOSPADM

## 2022-05-16 RX ORDER — SODIUM CHLORIDE, SODIUM LACTATE, POTASSIUM CHLORIDE, AND CALCIUM CHLORIDE .6; .31; .03; .02 G/100ML; G/100ML; G/100ML; G/100ML
30 INJECTION, SOLUTION INTRAVENOUS ONCE
Status: COMPLETED | OUTPATIENT
Start: 2022-05-16 | End: 2022-05-16

## 2022-05-16 RX ORDER — FLECAINIDE ACETATE 100 MG/1
50 TABLET ORAL 2 TIMES DAILY
Status: DISCONTINUED | OUTPATIENT
Start: 2022-05-16 | End: 2022-05-18 | Stop reason: HOSPADM

## 2022-05-16 RX ORDER — DILTIAZEM HYDROCHLORIDE 180 MG/1
180 CAPSULE, COATED, EXTENDED RELEASE ORAL DAILY
Status: DISCONTINUED | OUTPATIENT
Start: 2022-05-17 | End: 2022-05-18 | Stop reason: HOSPADM

## 2022-05-16 RX ORDER — OXYCODONE HYDROCHLORIDE AND ACETAMINOPHEN 5; 325 MG/1; MG/1
2 TABLET ORAL EVERY 4 HOURS PRN
Status: DISCONTINUED | OUTPATIENT
Start: 2022-05-16 | End: 2022-05-18 | Stop reason: HOSPADM

## 2022-05-16 RX ORDER — ONDANSETRON 4 MG/1
4 TABLET, ORALLY DISINTEGRATING ORAL EVERY 8 HOURS PRN
Status: DISCONTINUED | OUTPATIENT
Start: 2022-05-16 | End: 2022-05-18 | Stop reason: HOSPADM

## 2022-05-16 RX ORDER — ENOXAPARIN SODIUM 100 MG/ML
30 INJECTION SUBCUTANEOUS 2 TIMES DAILY
Status: DISCONTINUED | OUTPATIENT
Start: 2022-05-16 | End: 2022-05-16 | Stop reason: SDUPTHER

## 2022-05-16 RX ORDER — LISINOPRIL 20 MG/1
20 TABLET ORAL DAILY
Status: DISCONTINUED | OUTPATIENT
Start: 2022-05-17 | End: 2022-05-18 | Stop reason: HOSPADM

## 2022-05-16 RX ORDER — LORAZEPAM 2 MG/ML
4 INJECTION INTRAMUSCULAR
Status: DISCONTINUED | OUTPATIENT
Start: 2022-05-16 | End: 2022-05-18 | Stop reason: HOSPADM

## 2022-05-16 RX ORDER — LORAZEPAM 1 MG/1
2 TABLET ORAL
Status: DISCONTINUED | OUTPATIENT
Start: 2022-05-16 | End: 2022-05-18 | Stop reason: HOSPADM

## 2022-05-16 RX ORDER — SODIUM CHLORIDE 9 MG/ML
INJECTION, SOLUTION INTRAVENOUS CONTINUOUS
Status: DISCONTINUED | OUTPATIENT
Start: 2022-05-16 | End: 2022-05-18

## 2022-05-16 RX ORDER — LORAZEPAM 2 MG/ML
1 INJECTION INTRAMUSCULAR
Status: DISCONTINUED | OUTPATIENT
Start: 2022-05-16 | End: 2022-05-18 | Stop reason: HOSPADM

## 2022-05-16 RX ORDER — LORAZEPAM 1 MG/1
4 TABLET ORAL
Status: DISCONTINUED | OUTPATIENT
Start: 2022-05-16 | End: 2022-05-18 | Stop reason: HOSPADM

## 2022-05-16 RX ADMIN — SODIUM CHLORIDE, PRESERVATIVE FREE 10 ML: 5 INJECTION INTRAVENOUS at 21:33

## 2022-05-16 RX ADMIN — APIXABAN 5 MG: 5 TABLET, FILM COATED ORAL at 21:30

## 2022-05-16 RX ADMIN — CLONIDINE HYDROCHLORIDE 0.1 MG: 0.1 TABLET ORAL at 21:31

## 2022-05-16 RX ADMIN — SODIUM CHLORIDE 1500 ML: 9 INJECTION, SOLUTION INTRAVENOUS at 17:37

## 2022-05-16 RX ADMIN — SODIUM CHLORIDE: 9 INJECTION, SOLUTION INTRAVENOUS at 21:54

## 2022-05-16 RX ADMIN — FLECAINIDE ACETATE 50 MG: 100 TABLET ORAL at 21:32

## 2022-05-16 RX ADMIN — LORAZEPAM 1 MG: 2 INJECTION INTRAMUSCULAR; INTRAVENOUS at 16:43

## 2022-05-16 RX ADMIN — SODIUM CHLORIDE 3000 MG: 900 INJECTION INTRAVENOUS at 17:40

## 2022-05-16 RX ADMIN — TRAZODONE HYDROCHLORIDE 100 MG: 100 TABLET ORAL at 21:31

## 2022-05-16 RX ADMIN — THERA TABS 1 TABLET: TAB at 21:31

## 2022-05-16 RX ADMIN — SODIUM CHLORIDE, POTASSIUM CHLORIDE, SODIUM LACTATE AND CALCIUM CHLORIDE 1000 ML: 600; 310; 30; 20 INJECTION, SOLUTION INTRAVENOUS at 21:41

## 2022-05-16 RX ADMIN — PIPERACILLIN AND TAZOBACTAM 3375 MG: 3; .375 INJECTION, POWDER, LYOPHILIZED, FOR SOLUTION INTRAVENOUS at 21:56

## 2022-05-16 RX ADMIN — THIAMINE HCL TAB 100 MG 100 MG: 100 TAB at 21:32

## 2022-05-16 RX ADMIN — SODIUM CHLORIDE 1000 ML: 9 INJECTION, SOLUTION INTRAVENOUS at 16:42

## 2022-05-16 SDOH — ECONOMIC STABILITY: FOOD INSECURITY: WITHIN THE PAST 12 MONTHS, THE FOOD YOU BOUGHT JUST DIDN'T LAST AND YOU DIDN'T HAVE MONEY TO GET MORE.: NEVER TRUE

## 2022-05-16 SDOH — HEALTH STABILITY: PHYSICAL HEALTH: ON AVERAGE, HOW MANY DAYS PER WEEK DO YOU ENGAGE IN MODERATE TO STRENUOUS EXERCISE (LIKE A BRISK WALK)?: 7 DAYS

## 2022-05-16 SDOH — ECONOMIC STABILITY: INCOME INSECURITY: IN THE LAST 12 MONTHS, WAS THERE A TIME WHEN YOU WERE NOT ABLE TO PAY THE MORTGAGE OR RENT ON TIME?: NO

## 2022-05-16 SDOH — ECONOMIC STABILITY: FOOD INSECURITY: WITHIN THE PAST 12 MONTHS, YOU WORRIED THAT YOUR FOOD WOULD RUN OUT BEFORE YOU GOT MONEY TO BUY MORE.: NEVER TRUE

## 2022-05-16 SDOH — ECONOMIC STABILITY: HOUSING INSECURITY: IN THE LAST 12 MONTHS, HOW MANY PLACES HAVE YOU LIVED?: 1

## 2022-05-16 SDOH — ECONOMIC STABILITY: HOUSING INSECURITY
IN THE LAST 12 MONTHS, WAS THERE A TIME WHEN YOU DID NOT HAVE A STEADY PLACE TO SLEEP OR SLEPT IN A SHELTER (INCLUDING NOW)?: NO

## 2022-05-16 SDOH — ECONOMIC STABILITY: TRANSPORTATION INSECURITY
IN THE PAST 12 MONTHS, HAS LACK OF TRANSPORTATION KEPT YOU FROM MEETINGS, WORK, OR FROM GETTING THINGS NEEDED FOR DAILY LIVING?: NO

## 2022-05-16 SDOH — HEALTH STABILITY: PHYSICAL HEALTH: ON AVERAGE, HOW MANY MINUTES DO YOU ENGAGE IN EXERCISE AT THIS LEVEL?: 150+ MIN

## 2022-05-16 ASSESSMENT — SOCIAL DETERMINANTS OF HEALTH (SDOH)
WITHIN THE LAST YEAR, HAVE YOU BEEN KICKED, HIT, SLAPPED, OR OTHERWISE PHYSICALLY HURT BY YOUR PARTNER OR EX-PARTNER?: NO
HOW OFTEN DO YOU ATTENT MEETINGS OF THE CLUB OR ORGANIZATION YOU BELONG TO?: NEVER
WITHIN THE LAST YEAR, HAVE YOU BEEN HUMILIATED OR EMOTIONALLY ABUSED IN OTHER WAYS BY YOUR PARTNER OR EX-PARTNER?: NO
HOW HARD IS IT FOR YOU TO PAY FOR THE VERY BASICS LIKE FOOD, HOUSING, MEDICAL CARE, AND HEATING?: NOT HARD AT ALL
HOW OFTEN DO YOU GET TOGETHER WITH FRIENDS OR RELATIVES?: ONCE A WEEK
WITHIN THE LAST YEAR, HAVE TO BEEN RAPED OR FORCED TO HAVE ANY KIND OF SEXUAL ACTIVITY BY YOUR PARTNER OR EX-PARTNER?: NO
WITHIN THE LAST YEAR, HAVE YOU BEEN AFRAID OF YOUR PARTNER OR EX-PARTNER?: NO
IN A TYPICAL WEEK, HOW MANY TIMES DO YOU TALK ON THE PHONE WITH FAMILY, FRIENDS, OR NEIGHBORS?: MORE THAN THREE TIMES A WEEK
HOW OFTEN DO YOU ATTEND CHURCH OR RELIGIOUS SERVICES?: 1 TO 4 TIMES PER YEAR
DO YOU BELONG TO ANY CLUBS OR ORGANIZATIONS SUCH AS CHURCH GROUPS UNIONS, FRATERNAL OR ATHLETIC GROUPS, OR SCHOOL GROUPS?: NO

## 2022-05-16 ASSESSMENT — ENCOUNTER SYMPTOMS
NAUSEA: 0
SHORTNESS OF BREATH: 0
VOMITING: 0
BACK PAIN: 0

## 2022-05-16 ASSESSMENT — LIFESTYLE VARIABLES
HOW OFTEN DO YOU HAVE A DRINK CONTAINING ALCOHOL: 2-3 TIMES A WEEK
HOW OFTEN DO YOU HAVE A DRINK CONTAINING ALCOHOL: 4 OR MORE TIMES A WEEK
HOW MANY STANDARD DRINKS CONTAINING ALCOHOL DO YOU HAVE ON A TYPICAL DAY: 10 OR MORE
HOW MANY STANDARD DRINKS CONTAINING ALCOHOL DO YOU HAVE ON A TYPICAL DAY: 10 OR MORE

## 2022-05-16 ASSESSMENT — PATIENT HEALTH QUESTIONNAIRE - PHQ9
2. FEELING DOWN, DEPRESSED OR HOPELESS: NOT AT ALL
SUM OF ALL RESPONSES TO PHQ9 QUESTIONS 1 & 2: 0
DEPRESSION UNABLE TO ASSESS: YES
1. LITTLE INTEREST OR PLEASURE IN DOING THINGS: NOT AT ALL

## 2022-05-16 ASSESSMENT — PAIN - FUNCTIONAL ASSESSMENT: PAIN_FUNCTIONAL_ASSESSMENT: NONE - DENIES PAIN

## 2022-05-16 NOTE — CONSULTS
Clinical Pharmacy Note  Vancomycin Consult    Kiran Kyle is a 43 y.o. male ordered Vancomycin for toe infection; consult received from Dr. Marco Figueroa to manage therapy. Also receiving zosyn. Patient Active Problem List   Diagnosis    Mixed hyperlipidemia    Right upper quadrant abdominal pain    Fatty liver    Septic prepatellar bursitis of left knee    Prepatellar bursitis of left knee    Suicidal overdose, initial encounter ([facility]    [facility], [facility]    Alcohol use disorder, mild, abuse    Intentional ibuprofen overdose ([facility]    MDD (major depressive disorder), single episode    Sepsis ([facility]       Allergies:  Tramadol, Naproxen, and Nickel     Temp max:  Temp (24hrs), Av.1 °F (36.7 °C), Min:97.9 °F (36.6 °C), Max:98.3 °F (36.8 °C)      Recent Labs     22  1611   WBC 11.0       Recent Labs     22  1611   BUN 7   CREATININE 0.9         Intake/Output Summary (Last 24 hours) at 2022 1939  Last data filed at 2022 1818  Gross per 24 hour   Intake 1050 ml   Output    Net 1050 ml       Culture Results:      Ht Readings from Last 1 Encounters:   22 6' 2\" (1.88 m)        Wt Readings from Last 1 Encounters:   22 240 lb (108.9 kg)         Estimated Creatinine Clearance: 146 mL/min (based on SCr of 0.9 mg/dL). Assessment/Plan:  Vancomycin 1750 mg IV every 12 hours ordered. Regimen projects a trough level of 15-20 mg/L. Level ordered for 2300    Thank you for the consult.

## 2022-05-16 NOTE — ED PROVIDER NOTES
MidLevel Attestation   I havepersonally performed and/or participated in the history, exam and medical decision making and agree with all pertinent clinical information. I have also reviewed and agree with the past medical, family and social historyunless otherwise noted. I have personally performed a face to face diagnostic evaluation onthis patient. I have reviewed the mid-levels findings and agree. In brief, Meagan Escudero is a 43 y.o. male that presented to the emergency department with   Chief Complaint   Patient presents with    Toe Pain     Patient arrives via walk in with c/o left toe injury. 2-3 weeks ago accidently kicked something. Patient states that he was on doxy for it and completed the course of abx. .  CONSTITUTIONAL: Well appearing, in no acute distress   EYES: PERRL, No injection, discharge or scleral icterus. NECK: Normal ROM, NO LAD and Moist mucous membranes. CARDIOVASCULAR: Regular rate and rhythm. No murmurs or gallop. PULMONARY/CHEST: Airway patent. No retractions. Breath sounds clear with good air entry bilaterally. ABDOMEN: Soft, Non-distended and non-tender, without guarding or rebound. SKIN: Acyanotic, warm, dry   MUSCULOSKELETAL: No swelling, tenderness or deformity   NEUROLOGICAL: Awake. Pulses intact. Grossly nonfocal     25year-old gentleman history of alcohol abuse who presents complaining of 2 pain after he injured his toe 3 weeks ago. He was seen at urgent care and discharged home with antibiotics. Stated that he is taking antibiotics with no resolution. The pain is persisted. On presentation patient was tachycardic. Labs obtained with lactate of 3.6. X-ray showing soft tissue infection with no osteo-. Given the fact that this is persisted and patient is tachycardic with a lactate of 3.6 I am recommending that he be admitted for further evaluation including IV antibiotics and podiatry consult. Hospitalist has been consulted pending admission.     I have reviewed and interpreted all of the currently available lab results and diagnostics from this visit:  Results for orders placed or performed during the hospital encounter of 05/16/22   Culture, Blood 1    Specimen: Blood   Result Value Ref Range    Blood Culture, Routine       No Growth to date. Any change in status will be called. Culture, Blood 2    Specimen: Blood   Result Value Ref Range    Culture, Blood 2       No Growth to date. Any change in status will be called.    Culture, Wound    Specimen: Foot, Left   Result Value Ref Range    Gram Stain Result (A)      2+ Gram positive cocci  1+ WBC's (Polymorphonuclear)  No Epithelial Cells seen      Organism Staphylococcus coagulase-negative (A)     WOUND/ABSCESS Light growth  No further workup      CBC with Auto Differential   Result Value Ref Range    WBC 11.0 4.0 - 11.0 K/uL    RBC 4.92 4.20 - 5.90 M/uL    Hemoglobin 16.0 13.5 - 17.5 g/dL    Hematocrit 45.8 40.5 - 52.5 %    MCV 93.1 80.0 - 100.0 fL    MCH 32.4 26.0 - 34.0 pg    MCHC 34.8 31.0 - 36.0 g/dL    RDW 12.4 12.4 - 15.4 %    Platelets 085 313 - 361 K/uL    MPV 7.4 5.0 - 10.5 fL    Neutrophils % 74.4 %    Lymphocytes % 17.3 %    Monocytes % 6.7 %    Eosinophils % 0.6 %    Basophils % 1.0 %    Neutrophils Absolute 8.2 (H) 1.7 - 7.7 K/uL    Lymphocytes Absolute 1.9 1.0 - 5.1 K/uL    Monocytes Absolute 0.7 0.0 - 1.3 K/uL    Eosinophils Absolute 0.1 0.0 - 0.6 K/uL    Basophils Absolute 0.1 0.0 - 0.2 K/uL   Comprehensive Metabolic Panel w/ Reflex to MG   Result Value Ref Range    Sodium 132 (L) 136 - 145 mmol/L    Potassium reflex Magnesium 3.8 3.5 - 5.1 mmol/L    Chloride 94 (L) 99 - 110 mmol/L    CO2 21 21 - 32 mmol/L    Anion Gap 17 (H) 3 - 16    Glucose 193 (H) 70 - 99 mg/dL    BUN 7 7 - 20 mg/dL    CREATININE 0.9 0.9 - 1.3 mg/dL    GFR Non-African American >60 >60    GFR African American >60 >60    Calcium 9.6 8.3 - 10.6 mg/dL    Total Protein 7.9 6.4 - 8.2 g/dL    Albumin 4.4 3.4 - 5.0 g/dL Albumin/Globulin Ratio 1.3 1.1 - 2.2    Total Bilirubin 0.7 0.0 - 1.0 mg/dL    Alkaline Phosphatase 156 (H) 40 - 129 U/L    ALT 60 (H) 10 - 40 U/L    AST 71 (H) 15 - 37 U/L   Lactate, Sepsis   Result Value Ref Range    Lactic Acid, Sepsis 3.6 (H) 0.4 - 1.9 mmol/L   Lactate, Sepsis   Result Value Ref Range    Lactic Acid, Sepsis 3.2 (H) 0.4 - 1.9 mmol/L   Ethanol   Result Value Ref Range    Ethanol Lvl None Detected mg/dL   Basic Metabolic Panel w/ Reflex to MG   Result Value Ref Range    Sodium 135 (L) 136 - 145 mmol/L    Potassium reflex Magnesium 4.3 3.5 - 5.1 mmol/L    Chloride 98 (L) 99 - 110 mmol/L    CO2 26 21 - 32 mmol/L    Anion Gap 11 3 - 16    Glucose 238 (H) 70 - 99 mg/dL    BUN 9 7 - 20 mg/dL    CREATININE 0.9 0.9 - 1.3 mg/dL    GFR Non-African American >60 >60    GFR African American >60 >60    Calcium 9.0 8.3 - 10.6 mg/dL   CBC with Auto Differential   Result Value Ref Range    WBC 6.5 4.0 - 11.0 K/uL    RBC 4.56 4.20 - 5.90 M/uL    Hemoglobin 14.8 13.5 - 17.5 g/dL    Hematocrit 42.7 40.5 - 52.5 %    MCV 93.6 80.0 - 100.0 fL    MCH 32.5 26.0 - 34.0 pg    MCHC 34.7 31.0 - 36.0 g/dL    RDW 12.5 12.4 - 15.4 %    Platelets 196 265 - 981 K/uL    MPV 7.5 5.0 - 10.5 fL    Neutrophils % 64.4 %    Lymphocytes % 27.7 %    Monocytes % 6.0 %    Eosinophils % 1.5 %    Basophils % 0.4 %    Neutrophils Absolute 4.2 1.7 - 7.7 K/uL    Lymphocytes Absolute 1.8 1.0 - 5.1 K/uL    Monocytes Absolute 0.4 0.0 - 1.3 K/uL    Eosinophils Absolute 0.1 0.0 - 0.6 K/uL    Basophils Absolute 0.0 0.0 - 0.2 K/uL   Lactate, Sepsis   Result Value Ref Range    Lactic Acid, Sepsis 2.4 (H) 0.4 - 1.9 mmol/L   Lactate, Sepsis   Result Value Ref Range    Lactic Acid, Sepsis 1.9 0.4 - 1.9 mmol/L   Lactate, Sepsis   Result Value Ref Range    Lactic Acid, Sepsis 1.7 0.4 - 1.9 mmol/L   Vancomycin Level, Random   Result Value Ref Range    Vancomycin Rm 23.3 (HH) ug/mL   Hemoglobin A1C   Result Value Ref Range    Hemoglobin A1C 7.2 See comment % eAG 159.9 mg/dL   Sedimentation Rate   Result Value Ref Range    Sed Rate 27 (H) 0 - 15 mm/Hr   C-Reactive Protein   Result Value Ref Range    CRP 26.1 (H) 0.0 - 5.1 mg/L     No results found.     ED Medication Orders (From admission, onward)    Start Ordered     Status Ordering Provider    05/17/22 0900 05/16/22 1847  dilTIAZem (CARDIZEM CD) extended release capsule 180 mg  DAILY         Last MAR action: Given - by Camacho Rubio on 05/18/22 at 1034 ALI, JOSUÉ BISHOP    05/17/22 0900 05/16/22 1847  lisinopril (PRINIVIL;ZESTRIL) tablet 20 mg  DAILY         Last MAR action: Given - by Camcaho Rubio on 05/18/22 at 1034 [facility], JOSUÉ BISHOP    05/17/22 0900 05/16/22 1847  sertraline (ZOLOFT) tablet 100 mg  DAILY         Last MAR action: Given - by Camacho Rubio on 05/18/22 at 1034 [facility], JOSUÉ BISHOP    05/16/22 2100 05/16/22 1847  apixaban (ELIQUIS) tablet 5 mg  2 TIMES DAILY        Question:  Indication of Use  Answer:  A Fib/A Flutter    Last MAR action: Given - by Camacho Rubio on 05/18/22 at 1034 [facility], JOSUÉ S    05/16/22 2100 05/16/22 1847  flecainide (TAMBOCOR) tablet 50 mg  2 TIMES DAILY         Last MAR action: Given - by Camacho Rubio on 05/18/22 at 1034 [facility], JOSUÉ S    05/16/22 2100 05/16/22 1847  traZODone (DESYREL) tablet 100 mg  NIGHTLY PRN         Last MAR action: Given - by Nicole Terry on 05/17/22 at 2100 [facility], JOSUÉ S    05/16/22 2100 05/16/22 1847  sodium chloride flush 0.9 % injection 5-40 mL  2 times per day         Last MAR action: Given - by Camacho Rubio on 05/18/22 at 1036 ALI, JOSUÉ S    05/16/22 2000 05/16/22 1847  piperacillin-tazobactam (ZOSYN) 3,375 mg in dextrose 5 % 100 mL IVPB extended infusion (mini-bag)  EVERY 8 HOURS        Question Answer Comment   Antimicrobial Indications Skin and Soft Tissue Infection    Skin duration of therapy 7 days        Last MAR action: Stopped - by Camacho Rubio on 05/18/22 at 1551 JOSUÉ ARROYO    05/16/22 1915 05/16/22 1847  lactated ringers bolus  ONCE        Question:  Was full 30mL/kg fluid bolus ordered?   Answer:  Yes    Last MAR action: Stopped - by Rashida Numbers on 05/16/22 at 2359 ALIBERNARD[address] S    05/16/22 1915 05/16/22 1855  thiamine mononitrate tablet 100 mg  DAILY         Last MAR action: Given - by Mikie Heads on 05/18/22 at 1034 ALIBERNARDAD S    05/16/22 1915 05/16/22 1855  multivitamin 1 tablet  DAILY         Last MAR action: Given - by Mikie Heads on 05/18/22 at 1034 ALI JOSUÉ S    05/16/22 1855 05/16/22 1855  cloNIDine (CATAPRES) tablet 0.1 mg  EVERY 4 HOURS PRN         Last MAR action: Given - by Rashida Numbers on 05/16/22 at 2131 ALI, JOSUÉ S    05/16/22 1855 05/16/22 1855  LORazepam (ATIVAN) tablet 1 mg  EVERY 1 HOUR PRN (WITHDRAWAL)        \"Or\" Linked Group Details    Acknowledged ALI, JOSUÉ S    05/16/22 1855 05/16/22 1855  LORazepam (ATIVAN) injection 1 mg  EVERY 1 HOUR PRN (WITHDRAWAL)        \"Or\" Linked Group Details    Acknowledged ALI, JOSUÉ S    05/16/22 1855 05/16/22 1855  LORazepam (ATIVAN) tablet 2 mg  EVERY 1 HOUR PRN (WITHDRAWAL)        \"Or\" Linked Group Details    Acknowledged ALI, JOSUÉ S    05/16/22 1855 05/16/22 1855  LORazepam (ATIVAN) injection 2 mg  EVERY 1 HOUR PRN (WITHDRAWAL)        \"Or\" Linked Group Details    Acknowledged ALI, JOSUÉ S    05/16/22 1855 05/16/22 1855  LORazepam (ATIVAN) tablet 3 mg  EVERY 1 HOUR PRN (WITHDRAWAL)        \"Or\" Linked Group Details    Acknowledged ALI, JOSUÉ S    05/16/22 1855 05/16/22 1855  LORazepam (ATIVAN) injection 3 mg  EVERY 1 HOUR PRN (WITHDRAWAL)        \"Or\" Linked Group Details    Acknowledged ALI, JOSUÉ S    05/16/22 1855 05/16/22 1855  LORazepam (ATIVAN) tablet 4 mg  EVERY 1 HOUR PRN (WITHDRAWAL)        \"Or\" Linked Group Details    Acknowledged ALI, JOSUÉ S    05/16/22 1855 05/16/22 1855  LORazepam (ATIVAN) injection 4 mg  EVERY 1 HOUR PRN (WITHDRAWAL)        \"Or\" Linked Group Details    Acknowledged JOSUÉ ARROYO    05/16/22 1847 05/16/22 1847  sodium chloride flush 0.9 % injection 5-40 mL  PRN Acknowledged BERNARDA ARROYOD S    05/16/22 1847 05/16/22 1847  ondansetron (ZOFRAN-ODT) disintegrating tablet 4 mg  EVERY 8 HOURS PRN        \"Or\" Linked Group Details    Acknowledged CRUZ JOSUÉ S    05/16/22 1847 05/16/22 1847  ondansetron (ZOFRAN) injection 4 mg  EVERY 6 HOURS PRN        \"Or\" Linked Group Details    Acknowledged JOSUÉ ARROYO S    05/16/22 1847 05/16/22 1847  polyethylene glycol (GLYCOLAX) packet 17 g  DAILY PRN         Acknowledged CRUZ JOSUÉ S    05/16/22 1847 05/16/22 1847  acetaminophen (TYLENOL) tablet 650 mg  EVERY 6 HOURS PRN        \"Or\" Linked Group Details    Acknowledged JOSUÉ ARROYO S    05/16/22 1847 05/16/22 1847  acetaminophen (TYLENOL) suppository 650 mg  EVERY 6 HOURS PRN        \"Or\" Linked Group Details    Acknowledged JOSUÉ ARROYO S    05/16/22 1847 05/16/22 1847  oxyCODONE-acetaminophen (PERCOCET) 5-325 MG per tablet 1 tablet  EVERY 4 HOURS PRN        \"Or\" Linked Group Details    Acknowledged CRUZBERNARDAD S    05/16/22 1847 05/16/22 1847  oxyCODONE-acetaminophen (PERCOCET) 5-325 MG per tablet 2 tablet  EVERY 4 HOURS PRN        \"Or\" Linked Group Details    Acknowledged CRUZ JOSUÉ S    05/16/22 1847 05/16/22 1847  0.9 % sodium chloride infusion  PRN         Acknowledged JOSUÉ ARROYO S    05/16/22 1730 05/16/22 1724  0.9 % sodium chloride bolus  ONCE         Last MAR action: Stopped - by Viola Smith on 05/16/22 at [facility],[address], [place]    05/16/22 1730 05/16/22 1724  ampicillin-sulbactam (UNASYN) 3000 mg ivpb minibag  ONCE        Question:  Antimicrobial Indications  Answer:  Skin and Soft Tissue Infection    Last MAR action: Stopped - by Maribel Rosenthal on 05/16/22 at [address], [place]    05/16/22 1600 05/16/22 1547  LORazepam (ATIVAN) injection 1 mg  ONCE         Last MAR action: Given - by Maribel Rosenthal on 05/16/22 at 1643 ANDRES BRUNO    05/16/22 1600 05/16/22 1547  0.9 % sodium chloride bolus  ONCE         Last MAR action: Stopped - by Maribel Rosenthal on 05/16/22 at 8024 ANDRES BRUNO          Final Impression      1. Toe infection    2. Failure of outpatient treatment    3. Septicemia ([address]    4. Atrial fibrillation with rapid ventricular response (HCC)    5. Elevated LFTs        DISPOSITION         This record is transcribed utilizing voice recognition technology. There are inherent limitations in this technology. In addition, there may be limitations in editing of this report. If there are any discrepancies, please contact me directly.     Owen Ballard MD   5/18/2022         Owen Ballard MD  05/18/22 5961

## 2022-05-16 NOTE — ED NOTES
Dr Ping Ocasio at bedside, alerted to patient's blood pressure of 155/145,patient asymptomatic, no new orders, per MD patient to go to IP bed, no sxs of distress noted.       Jael Noriega RN  05/16/22 1554

## 2022-05-16 NOTE — ED PROVIDER NOTES
[address] [facility]      Pt Name: Elsy Calzada  MRN: [id number]  [place] 1985  Date of evaluation: 5/16/2022  Provider: Eleno Becker PA-C    This patient was seen and evaluated by the attending physician Liset Vu MD.    CHIEF COMPLAINT      Chief Complaint: toe pain      CRITICAL CARE TIME   Total Critical Care time was 35 minutes, excluding separately reportable procedures. There was a high probability of clinically significant/life threatening deterioration in the patient's condition which required my urgent intervention. HISTORYOF PRESENT ILLNESS  (Location/Symptom, Timing/Onset, Context/Setting, Quality, Duration, Modifying Factors, Severity.)   Elsy Calzada is a 43 y.o. male with a history of hypertension, atrial fibrillation, alcohol abuse who presents to the emergency department with left second toe pain. 2 to 3 weeks ago he accidentally kicked a stack of tiles. He he developed a wound that became infected. He finished a course of doxycycline that was prescribed to him by an urgent care. He says he thinks he finished it about a week ago but the area is still not healing. Patient admits that he is a heavy drinker and says he drinks 12 beers a day. He appears anxious. His last drink was last night. He has a history of atrial fibrillation and is on Eliquis. Nursing Notes were reviewed and I agree. REVIEW OF SYSTEMS    (2-9 systems for level 4, 10 or more forlevel 5)     Review of Systems   Constitutional: Negative for chills and fever. Respiratory: Negative for shortness of breath. Cardiovascular: Positive for palpitations. Negative for chest pain. Gastrointestinal: Negative for nausea and vomiting. Genitourinary: Negative for difficulty urinating. Musculoskeletal: Positive for arthralgias and joint swelling. Negative for back pain and neck pain. Skin: Positive for wound. Negative for rash. Neurological: Negative for weakness and numbness. All other systems reviewed and are negative. Positives and Pertinent negatives as per HPI. Except as noted above the remainder of the review of systems was reviewed and negative. PAST MEDICALHISTORY         Diagnosis Date    Anxiety     Hypertension     Paroxysmal atrial fibrillation (HCC)        SURGICAL HISTORY           Procedure Laterality Date    ENDOSCOPY, COLON, DIAGNOSTIC      KNEE ARTHROSCOPY Left     MOUTH SURGERY      tooth removed    NASAL FRACTURE SURGERY N/A 2021    CLOSED REDUCTION OF NASAL BONE FRACTURE performed by Umu Hammond MD at [address]       Previous Medications    APIXABAN (ELIQUIS) 5 MG TABS TABLET    Take 5 mg by mouth 2 times daily    DILTIAZEM (CARDIZEM CD) 180 MG EXTENDED RELEASE CAPSULE    Take 180 mg by mouth daily    FLECAINIDE (TAMBOCOR) 50 MG TABLET    Take 50 mg by mouth 2 times daily     LISINOPRIL (PRINIVIL;ZESTRIL) 20 MG TABLET    Take 20 mg by mouth daily     SERTRALINE (ZOLOFT) 100 MG TABLET    Take 100 mg by mouth daily    TRAZODONE (DESYREL) 100 MG TABLET    Take 100 mg by mouth nightly as needed for Sleep       ALLERGIES     Tramadol, Naproxen, and Nickel    FAMILY HISTORY           Problem Relation Age of Onset    High Blood Pressure Father     Heart Disease Father     Stroke Father     Cancer Maternal Grandmother     Cancer Maternal Grandfather     Cancer Paternal Grandmother         lung     Family Status   Relation Name Status    Mother  Alive    Father  Alive    Brother  Alive    MGM      MGF      PGM      PGF      Brother  Alive        SOCIAL HISTORY    reports that he has never smoked. His smokeless tobacco use includes snuff. He reports current alcohol use of about 8.0 - 12.0 standard drinks of alcohol per week. He reports that he does not use drugs.     PHYSICAL EXAM    (up to 7 for level 4, 8 or more for level 5) ED Triage Vitals [05/16/22 1541]   BP Temp Temp Source Pulse Resp SpO2 Height Weight   (!) 172/102 98 °F (36.7 °C) Temporal 109 18 97 % -- 240 lb (108.9 kg)       Physical Exam  Vitals and nursing note reviewed. Constitutional:       General: He is not in acute distress. Appearance: He is well-developed. He is ill-appearing. HENT:      Head: Normocephalic and atraumatic. Cardiovascular:      Rate and Rhythm: Tachycardia present. Rhythm irregular. Heart sounds: Normal heart sounds. Pulmonary:      Effort: Pulmonary effort is normal. No respiratory distress. Breath sounds: Normal breath sounds. Abdominal:      Palpations: Abdomen is soft. Tenderness: There is no abdominal tenderness. Musculoskeletal:      Cervical back: Neck supple. Comments: There is an open nonhealing wound to the distal aspect of the left second toe with some associated swelling of the distal toe and erythema noted. Erythema does not extend to the foot. He has good range of motion of the digit. Reduced sensation at baseline secondary to history of neuropathy. 2+ dorsalis pedis and posterior tibialis pulses palpated. Brisk capillary refill. Skin:     General: Skin is warm and dry. Neurological:      Mental Status: He is alert and oriented to person, place, and time. Psychiatric:         Behavior: Behavior normal.            DIAGNOSTIC RESULTS     When ordered, EKGs are interpreted by the Emergency Department Physician in the absence of a cardiologist. Please see their note for interpretation of EKG    RADIOLOGY:         Interpretation per the Radiologist below, if available at the time of this note:    XR TOE LEFT (MIN 2 VIEWS)   Final Result   Soft tissue ulcer at the tip of the 2nd toe. No evidence of underlying   osteomyelitis.              LABS:  Labs Reviewed   CBC WITH AUTO DIFFERENTIAL - Abnormal; Notable for the following components:       Result Value    Neutrophils Absolute 8.2 (*) All other components within normal limits   COMPREHENSIVE METABOLIC PANEL W/ REFLEX TO MG FOR LOW K - Abnormal; Notable for the following components:    Sodium 132 (*)     Chloride 94 (*)     Anion Gap 17 (*)     Glucose 193 (*)     Alkaline Phosphatase 156 (*)     ALT 60 (*)     AST 71 (*)     All other components within normal limits   LACTATE, SEPSIS - Abnormal; Notable for the following components:    Lactic Acid, Sepsis 3.6 (*)     All other components within normal limits   LACTATE, SEPSIS - Abnormal; Notable for the following components:    Lactic Acid, Sepsis 3.2 (*)     All other components within normal limits   CULTURE, BLOOD 1   CULTURE, BLOOD 2   ETHANOL       When ordered, only abnormal lab results are displayed. All other labs are within normal range or not returned as of the dictation. EMERGENCY DEPARTMENT COURSE and DIFFERENTIAL DIAGNOSIS/MDM:   Vitals:    Vitals:    05/16/22 1541 05/16/22 1645 05/16/22 1700 05/16/22 1730   BP: (!) 172/102 (!) 168/104 (!) 186/93 (!) 155/131   Pulse: 109 112     Resp: 18 17  18   Temp: 98 °F (36.7 °C)      TempSrc: Temporal      SpO2: 97% 98% 95% 97%   Weight: 240 lb (108.9 kg)      Height: 6' 2\" (1.88 m)          I saw this patient with Dr. Goerges Low who spent face-to-face time with the patient and agreed with my assessment and plan. The patient was in atrial fibrillation with rapid ventricular rate. He has failed outpatient management of this toe infection. He has a lactate of 3.6. This is downtrending. He was given 30 cc/kg fluid bolus and started on Unasyn after cultures were sent. White blood cell count was normal.  H&H stable. Grossly normal electrolytes. Anion gap elevation of 17. Glucose 193. Renal function normal.  His LFTs are elevated. He has no ethanol detected, he is likely withdrawing. He was given a dose of Ativan down here.   X-ray of the toe was performed that showed soft tissue ulcer at the tip of the second toe but no underlying osteomyelitis. We have reached out to the hospitalist via perfect serve for admission the hospital for further inpatient management. SEP-1 CORE MEASURE DATA    Classification: sepsis    Amount of fluids ordered: at least 30mL/kg based on ideal body weight due to obesity defined as BMI >30 (patient's BMI is Body mass index is 30.81 kg/m².)    Time at which sepsis was identified: 1724    Broad-spectrum antibiotics chosen: Unasyn based on sepsis order-set for a suspected source of: Skin and Soft Tissue    Repeat lactate level: improving    On reassessment after fluid resuscitation:   pending, to be completed by inpatient team          PROCEDURES:  None    FINAL IMPRESSION      1. Toe infection    2. Failure of outpatient treatment    3. Septicemia ([address]    4. Atrial fibrillation with rapid ventricular response (HCC)    5. Elevated LFTs          DISPOSITION/PLAN   DISPOSITION Admitted 05/16/2022 06:11:47 PM      PATIENT REFERRED TO:  No follow-up provider specified.     MEDICATIONS:  New Prescriptions    No medications on file       (Please note that portions of this note were completed with a voice recognition program.  Efforts were made toedit the dictations but occasionally words are mis-transcribed.)    RODRIGUEZ Turpin PA-C  05/16/22 7735

## 2022-05-16 NOTE — ED NOTES
Pt states spouse is in route and may want patient transferred to [address] [place] [address] due to cardiology care, Dr Sarthak Young notified.       Bernabe Diaz RN  05/16/22 7946

## 2022-05-16 NOTE — PROGRESS NOTES
Medication Reconciliation    List of medications patient is currently taking is complete. Source of information: 1.  Conversation with patient                                       2. EPIC records      Allergies  Tramadol, Naproxen, and Nickel     Arthur Salmeron [facility], PharmVENKAT, BCPS  5/16/2022 6:30 PM

## 2022-05-16 NOTE — PROGRESS NOTES
Admitted to room 4264 from ER. Oriented to room and call light. Vitals and assessment completed. No distress noted.

## 2022-05-16 NOTE — H&P
Hospital Medicine History & Physical      PCP: Martha Leal MD    Date of Admission: 5/16/2022    Date of Service: Pt seen/examined on 5/16/2022   and Admitted to Inpatient with expected LOS greater than two midnights due to medical therapy. Chief Complaint: Left toe infection      History Of Present Illness:     43 y.o. male who presented to [facility] [place] [facility]"" AT [place] with left toe infection. Patient stubbed his toe while kicking something several weeks ago. He went to an urgent care with left toe infection at that time and was prescribed doxycycline for 1 week. Patient states that his left toe has become increasingly discolored and he decided to come to the hospital for care. He may have had some subjective chills. He denies foot pain in fact he says that his left foot feels numb. He denies having diabetes. Past Medical History:          Diagnosis Date    Anxiety     Hypertension     Paroxysmal atrial fibrillation (HCC)        Past Surgical History:          Procedure Laterality Date    ENDOSCOPY, COLON, DIAGNOSTIC      KNEE ARTHROSCOPY Left     MOUTH SURGERY      tooth removed    NASAL FRACTURE SURGERY N/A 7/23/2021    CLOSED REDUCTION OF NASAL BONE FRACTURE performed by Mayank Merritt MD at [place] [address]       Medications Prior to Admission:      Prior to Admission medications    Medication Sig Start Date End Date Taking?  Authorizing Provider   dilTIAZem (CARDIZEM CD) 180 MG extended release capsule Take 180 mg by mouth daily   Yes Historical Provider, MD   sertraline (ZOLOFT) 100 MG tablet Take 100 mg by mouth daily   Yes Historical Provider, MD   apixaban (ELIQUIS) 5 MG TABS tablet Take 5 mg by mouth 2 times daily 3/25/22   Historical Provider, MD   traZODone (DESYREL) 100 MG tablet Take 100 mg by mouth nightly as needed for Sleep    Historical Provider, MD   flecainide (TAMBOCOR) 50 MG tablet Take 50 mg by mouth 2 times daily  10/31/19   Historical Provider, MD   lisinopril (PRINIVIL;ZESTRIL) 20 MG tablet Take 20 mg by mouth daily  6/30/19 7/23/21  Historical Provider, MD       Allergies:  Tramadol, Naproxen, and Nickel    Social History:      The patient currently lives with family. 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 His smokeless tobacco use includes snuff. ETOH:   reports current alcohol use of about 8.0 - 12.0 standard drinks of alcohol per week. E-Cigarettes/Vaping Use     Questions Responses    E-Cigarette/Vaping Use Never User    Start Date     Passive Exposure     Quit Date     Counseling Given     Comments             Family History:      Reviewed in detail and negative for DM, CAD, Cancer, CVA. Positive as follows:        Problem Relation Age of Onset    High Blood Pressure Father     Heart Disease Father     Stroke Father     Cancer Maternal Grandmother     Cancer Maternal Grandfather     Cancer Paternal Grandmother         lung       REVIEW OF SYSTEMS COMPLETED:   Pertinent positives as noted in the HPI. All other systems reviewed and negative. PHYSICAL EXAM PERFORMED:    BP (!) 155/145   Pulse 102   Temp 98 °F (36.7 °C) (Temporal)   Resp 17   Ht 6' 2\" (1.88 m)   Wt 240 lb (108.9 kg)   SpO2 97%   BMI 30.81 kg/m²     General appearance:  No apparent distress, appears stated age and cooperative. HEENT:  Normal cephalic, atraumatic without obvious deformity. Pupils equal, round, and reactive to light. Extra ocular muscles intact. Conjunctivae/corneas clear. Neck: Supple, with full range of motion. No jugular venous distention. Trachea midline. Respiratory:  Normal respiratory effort. Clear to auscultation, bilaterally without Rales/Wheezes/Rhonchi. Cardiovascular:  Regular rate and rhythm with normal S1/S2 without murmurs, rubs or gallops. Abdomen: Soft, non-tender, non-distended with normal bowel sounds. Musculoskeletal:  No clubbing, cyanosis or edema bilaterally. Full range of motion without deformity.   Skin: Ulceration and swelling of the DIP joint of the second toe of the left foot. Mild lymphangitic streaking up the dorsal aspect of the foot with scattered petechiae  Neurologic:  Neurovascularly intact without any focal sensory/motor deficits. Cranial nerves: II-XII intact, grossly non-focal.  Psychiatric:  Alert and oriented, thought content appropriate, normal insight  Capillary Refill: Brisk,3 seconds, normal  Peripheral Pulses: +2 palpable, equal bilaterally       Labs:     Recent Labs     05/16/22  1611   WBC 11.0   HGB 16.0   HCT 45.8        Recent Labs     05/16/22  1611   *   K 3.8   CL 94*   CO2 21   BUN 7   CREATININE 0.9   CALCIUM 9.6     Recent Labs     05/16/22  1611   AST 71*   ALT 60*   BILITOT 0.7   ALKPHOS 156*     No results for input(s): INR in the last 72 hours. No results for input(s): Bryan Naomi in the last 72 hours. Urinalysis:      Lab Results   Component Value Date    NITRU Negative 03/06/2020    WBCUA None seen 03/06/2020    RBCUA 0-2 03/06/2020    BLOODU TRACE-LYSED 03/06/2020    SPECGRAV <=1.005 03/06/2020    GLUCOSEU Negative 03/06/2020       Radiology:     Independently reviewed by me    XR TOE LEFT (MIN 2 VIEWS)   Final Result   Soft tissue ulcer at the tip of the 2nd toe. No evidence of underlying   osteomyelitis. ASSESSMENT:    Sepsis due to soft tissue infection of the second toe of the left foot possible underlying osteomyelitis versus abscess    Alcohol dependency possible abuse    Paroxysmal atrial fibrillation with planned ablation in June by Dr Maryjane York. On continuous oral anticoagulation eliquis    PLAN:    IV fluid hydration per sepsis protocol  Serial lactates  Vancomycin and Zosyn for staph gram-negative and pseudomonal coverage  Infectious disease and podiatry surgery consultation  Will discuss with them in the morning about appropriate next step including imaging.   Patient has severe claustrophobia and is reluctant to get MRI imaging. Could consider CT scan versus bone scan. Will decide in am  Hold Eliquis as patient could require surgical intervention i.e. debridement biopsy or even amputation  Continue flecainide Zestril and Cardizem for hypertension and A. Fib. Has ablation scheduled for June. ETOH reduction only partially improved afib episodes per review of cardiology notes. He is temporarily on eliquis until ablation as CHADS VASC score is low  CIWA protocol initiated  Patient is sensitive about discussions related to his alcohol use(met the C,A, and possibly G part of CAGE questionnaire. This should be followed up on as an outpatient by PCP and his cardiology specialists        DVT Prophylaxis: eliquis  Diet: No diet orders on file  Code Status: Prior      Dispo - 2-3 d       Jeromy Gould MD    Thank you Rafa Rivera MD for the opportunity to be involved in this patient's care. If you have any questions or concerns please feel free to contact me at [phone].

## 2022-05-17 LAB
ANION GAP SERPL CALCULATED.3IONS-SCNC: 11 MMOL/L (ref 3–16)
BASOPHILS ABSOLUTE: 0 K/UL (ref 0–0.2)
BASOPHILS RELATIVE PERCENT: 0.4 %
BUN BLDV-MCNC: 9 MG/DL (ref 7–20)
CALCIUM SERPL-MCNC: 9 MG/DL (ref 8.3–10.6)
CHLORIDE BLD-SCNC: 98 MMOL/L (ref 99–110)
CO2: 26 MMOL/L (ref 21–32)
CREAT SERPL-MCNC: 0.9 MG/DL (ref 0.9–1.3)
EOSINOPHILS ABSOLUTE: 0.1 K/UL (ref 0–0.6)
EOSINOPHILS RELATIVE PERCENT: 1.5 %
GFR AFRICAN AMERICAN: >60
GFR NON-AFRICAN AMERICAN: >60
GLUCOSE BLD-MCNC: 238 MG/DL (ref 70–99)
HCT VFR BLD CALC: 42.7 % (ref 40.5–52.5)
HEMOGLOBIN: 14.8 G/DL (ref 13.5–17.5)
LACTIC ACID, SEPSIS: 1.7 MMOL/L (ref 0.4–1.9)
LACTIC ACID, SEPSIS: 1.9 MMOL/L (ref 0.4–1.9)
LACTIC ACID, SEPSIS: 2.4 MMOL/L (ref 0.4–1.9)
LYMPHOCYTES ABSOLUTE: 1.8 K/UL (ref 1–5.1)
LYMPHOCYTES RELATIVE PERCENT: 27.7 %
MCH RBC QN AUTO: 32.5 PG (ref 26–34)
MCHC RBC AUTO-ENTMCNC: 34.7 G/DL (ref 31–36)
MCV RBC AUTO: 93.6 FL (ref 80–100)
MONOCYTES ABSOLUTE: 0.4 K/UL (ref 0–1.3)
MONOCYTES RELATIVE PERCENT: 6 %
NEUTROPHILS ABSOLUTE: 4.2 K/UL (ref 1.7–7.7)
NEUTROPHILS RELATIVE PERCENT: 64.4 %
PDW BLD-RTO: 12.5 % (ref 12.4–15.4)
PLATELET # BLD: 189 K/UL (ref 135–450)
PMV BLD AUTO: 7.5 FL (ref 5–10.5)
POTASSIUM REFLEX MAGNESIUM: 4.3 MMOL/L (ref 3.5–5.1)
RBC # BLD: 4.56 M/UL (ref 4.2–5.9)
SODIUM BLD-SCNC: 135 MMOL/L (ref 136–145)
VANCOMYCIN RANDOM: 23.3 UG/ML
WBC # BLD: 6.5 K/UL (ref 4–11)

## 2022-05-17 PROCEDURE — 99222 1ST HOSP IP/OBS MODERATE 55: CPT | Performed by: INTERNAL MEDICINE

## 2022-05-17 PROCEDURE — 2580000003 HC RX 258: Performed by: INTERNAL MEDICINE

## 2022-05-17 PROCEDURE — 1200000000 HC SEMI PRIVATE

## 2022-05-17 PROCEDURE — 85025 COMPLETE CBC W/AUTO DIFF WBC: CPT

## 2022-05-17 PROCEDURE — 6360000002 HC RX W HCPCS: Performed by: INTERNAL MEDICINE

## 2022-05-17 PROCEDURE — 87205 SMEAR GRAM STAIN: CPT

## 2022-05-17 PROCEDURE — 94760 N-INVAS EAR/PLS OXIMETRY 1: CPT

## 2022-05-17 PROCEDURE — 87070 CULTURE OTHR SPECIMN AEROBIC: CPT

## 2022-05-17 PROCEDURE — 36415 COLL VENOUS BLD VENIPUNCTURE: CPT

## 2022-05-17 PROCEDURE — 83036 HEMOGLOBIN GLYCOSYLATED A1C: CPT

## 2022-05-17 PROCEDURE — 83605 ASSAY OF LACTIC ACID: CPT

## 2022-05-17 PROCEDURE — 6370000000 HC RX 637 (ALT 250 FOR IP): Performed by: INTERNAL MEDICINE

## 2022-05-17 PROCEDURE — 80048 BASIC METABOLIC PNL TOTAL CA: CPT

## 2022-05-17 PROCEDURE — 80202 ASSAY OF VANCOMYCIN: CPT

## 2022-05-17 PROCEDURE — 0JBR0ZZ EXCISION OF LEFT FOOT SUBCUTANEOUS TISSUE AND FASCIA, OPEN APPROACH: ICD-10-PCS | Performed by: PODIATRIST

## 2022-05-17 RX ADMIN — APIXABAN 5 MG: 5 TABLET, FILM COATED ORAL at 20:58

## 2022-05-17 RX ADMIN — FLECAINIDE ACETATE 50 MG: 100 TABLET ORAL at 21:04

## 2022-05-17 RX ADMIN — VANCOMYCIN HYDROCHLORIDE 1750 MG: 1 INJECTION, POWDER, LYOPHILIZED, FOR SOLUTION INTRAVENOUS at 02:12

## 2022-05-17 RX ADMIN — THERA TABS 1 TABLET: TAB at 08:14

## 2022-05-17 RX ADMIN — FLECAINIDE ACETATE 50 MG: 100 TABLET ORAL at 08:14

## 2022-05-17 RX ADMIN — LISINOPRIL 20 MG: 20 TABLET ORAL at 08:14

## 2022-05-17 RX ADMIN — SODIUM CHLORIDE, PRESERVATIVE FREE 20 ML: 5 INJECTION INTRAVENOUS at 19:48

## 2022-05-17 RX ADMIN — PIPERACILLIN AND TAZOBACTAM 3375 MG: 3; .375 INJECTION, POWDER, LYOPHILIZED, FOR SOLUTION INTRAVENOUS at 12:12

## 2022-05-17 RX ADMIN — APIXABAN 5 MG: 5 TABLET, FILM COATED ORAL at 08:14

## 2022-05-17 RX ADMIN — SERTRALINE 100 MG: 100 TABLET, FILM COATED ORAL at 08:14

## 2022-05-17 RX ADMIN — PIPERACILLIN AND TAZOBACTAM 3375 MG: 3; .375 INJECTION, POWDER, LYOPHILIZED, FOR SOLUTION INTRAVENOUS at 05:14

## 2022-05-17 RX ADMIN — TRAZODONE HYDROCHLORIDE 100 MG: 100 TABLET ORAL at 21:00

## 2022-05-17 RX ADMIN — DILTIAZEM HYDROCHLORIDE 180 MG: 180 CAPSULE, COATED, EXTENDED RELEASE ORAL at 08:14

## 2022-05-17 RX ADMIN — THIAMINE HCL TAB 100 MG 100 MG: 100 TAB at 08:14

## 2022-05-17 RX ADMIN — PIPERACILLIN AND TAZOBACTAM 3375 MG: 3; .375 INJECTION, POWDER, LYOPHILIZED, FOR SOLUTION INTRAVENOUS at 20:59

## 2022-05-17 NOTE — CARE COORDINATION
Pt seen. Podiatry has already been in and dress wound. Dressing to left foot is dry and intact. REC: tx per podiatry.  Shamir Garcia, MSN, RN, [facility]

## 2022-05-17 NOTE — PROGRESS NOTES
4 Eyes Skin Assessment     NAME:  Keny Vergara  YOB: 1985  MEDICAL RECORD NUMBER:  [id number]    The patient is being assess for  Admission    I agree that 2 RN's have performed a thorough Head to Toe Skin Assessment on the patient. ALL assessment sites listed below have been assessed. Areas assessed by both nurses:    Head, Face, Ears, Shoulders, Back, Chest, Arms, Elbows, Hands, Sacrum. Buttock, Coccyx, Ischium and Legs. Feet and Heels        Does the Patient have a Wound? Yes wound(s) were present on assessment. LDA wound assessment was Initiated and completed - Ulceration present on let third toe with brown black discoloration on top /tip of toe. Area hard to touch . Area beneath toe inflamed and boggy. No drainage .  Toenail involvement       Mando Prevention initiated:  No   Wound Care Orders initiated:  No    Pressure Injury (Stage 3,4, Unstageable, DTI, NWPT, and Complex wounds) if present place consult order under : yes    New and Established Ostomies if present place consult order under : No      Nurse 1 eSignature: Electronically signed by Lilian Brown RN on 5/17/22 at 4:47 AM EDT    **SHARE this note so that the co-signing nurse is able to place an eSignature**    Nurse 2 eSignature: Electronically signed by Hermelindo Dunaway RN on 5/17/22 at 4:51 AM EDT

## 2022-05-17 NOTE — PROGRESS NOTES
Clinical Pharmacy Note  Vancomycin Consult    Gissel Loaiza is a 43 y.o. male ordered Vancomycin for toe infection; consult received from Dr. Amanuel Mcguire to manage therapy. Also receiving zosyn. Patient Active Problem List   Diagnosis    Mixed hyperlipidemia    Right upper quadrant abdominal pain    Fatty liver    Septic prepatellar bursitis of left knee    Prepatellar bursitis of left knee    Suicidal overdose, initial encounter ([facility]    Major depressive disorder, single episode, severe ([facility]    Alcohol use disorder, mild, abuse    Intentional ibuprofen overdose ([facility]    MDD (major depressive disorder), single episode    Sepsis ([facility]       Allergies:  Tramadol, Naproxen, and Nickel     Temp max:  Temp (24hrs), Av.1 °F (36.7 °C), Min:97.7 °F (36.5 °C), Max:98.6 °F (37 °C)      Recent Labs     22  1611 22  0609   WBC 11.0 6.5       Recent Labs     22  1611 22  0609   BUN 7 9   CREATININE 0.9 0.9         Intake/Output Summary (Last 24 hours) at 2022 8868  Last data filed at 2022 1818  Gross per 24 hour   Intake 1050 ml   Output    Net 1050 ml       Culture Results:      Ht Readings from Last 1 Encounters:   22 6' 2\" (1.88 m)        Wt Readings from Last 1 Encounters:   22 255 lb 11.7 oz (116 kg)         Estimated Creatinine Clearance: 151 mL/min (based on SCr of 0.9 mg/dL). Assessment/Plan:    Vanco day # 1  Vanco 1750 mg x 1 given in ER  Random vanco level 4 hours post-infusion = 23.3 mg/L    Bayesian kinetics recommends a maintenance dose of 1250 mg IV Q12H  Predicted AUC = 504  Goal -600    Will start maintenance regimen at 1800 tonight  Vanco trough tomorrow am at 0500    Thank you for the consult. Ivette Brady, LynetteD.   2022  8:23 AM

## 2022-05-17 NOTE — CARE COORDINATION
INITIAL CASE MANAGEMENT ASSESSMENT    Reviewed chart, met with patient & wife to assess possible discharge needs. Explained Case Management role/services. Living Situation: verified address, lives with wife & teenage son in a 2 story house with about 4 LISA    ADLs: independent     Transportation: active , wife can take him home at [facility]     Medications: no barriers, uses [employer] in [place]    PCP: Jannie Acosta MD    PLAN/COMMENTS: denies any needs, declines alcohol resources, plans to return home at [place], wife will drive    CM provided contact information for patient or family to call with any questions. CM will follow and assist as needed.     Zackery Tam RN, BSN,   [phone]    Electronically signed by Zackery Tam RN on 5/17/2022 at 1:49 PM

## 2022-05-17 NOTE — CONSULTS
CM to assess for dc needs.     Casper Young RN, BSN,   [phone]  Electronically signed by Casper Young RN on 5/17/2022 at 1:49 PM

## 2022-05-17 NOTE — PROGRESS NOTES
Physician Progress Note      Dacia Parnell  [facility] #:                  [id number]  :                       1985  ADMIT DATE:       2022 3:35 PM  [address] [place] DATE:  RESPONDING  PROVIDER #:        JOSUÉ ARROYO MD          QUERY TEXT:    Patient admitted with left 2nd toe infection. Noted documentation of sepsis in   H&P on 22. In order to support the diagnosis of sepsis, please include   additional clinical indicators in your documentation. Or please document if   the diagnosis of sepsis has been ruled out after further study    The medical record reflects the following:  Risk Factors: Trauma to left 2nd toe 2-3 weeks ago, urgent care, wound to toe,   doxycycline outpt, wound still not healing. Clinical Indicators: P 112, RR 22. ..lactate 3.6,  Gluc 193. Heddy  Heddy  Heddy  Left Toe-Soft   tissue ulcer at the tip of the 2nd toe. No evidence of underlying  osteomyelitis. Treatment: IVF, Serial Lactates, Unasyn IV, Vanco IV, Zosyn IV, ID consult,    Thank Yue Rea RN BSN CDS CRCR  [email or web address]  Options provided:  -- Sepsis present as evidenced by, Please document evidence.   -- Sepsis was ruled out after study  -- Other - I will add my own diagnosis  -- Disagree - Not applicable / Not valid  -- Disagree - Clinically unable to determine / Unknown  -- Refer to Clinical Documentation Reviewer    PROVIDER RESPONSE TEXT:    Sepsis is present as evidenced by soft tissue infection, tachycardia,   leukocytosis and lactic acidosis    Query created by: Meet Query on 2022 12:50 PM      Electronically signed by:  JOSUÉ ARROYO MD 2022 3:18 PM

## 2022-05-17 NOTE — CONSULTS
 Tobacco comment: dip   Vaping Use    Vaping Use: Never used   Substance Use Topics    Alcohol use: Yes     Alcohol/week: 8.0 - 12.0 standard drinks     Types: 8 - 12 Cans of beer per week     Comment: 8-12 beers 5 days/week    Drug use: No       ALLERGIES    Allergies   Allergen Reactions    Tramadol Hives    Naproxen Rash    Nickel Rash     Pt develops rash when nickel buckle comes in contact with skin. MEDICATIONS    No current facility-administered medications on file prior to encounter. Current Outpatient Medications on File Prior to Encounter   Medication Sig Dispense Refill    dilTIAZem (CARDIZEM CD) 180 MG extended release capsule Take 180 mg by mouth daily      sertraline (ZOLOFT) 100 MG tablet Take 100 mg by mouth daily      apixaban (ELIQUIS) 5 MG TABS tablet Take 5 mg by mouth 2 times daily      traZODone (DESYREL) 100 MG tablet Take 100 mg by mouth nightly as needed for Sleep      flecainide (TAMBOCOR) 50 MG tablet Take 50 mg by mouth 2 times daily       lisinopril (PRINIVIL;ZESTRIL) 20 MG tablet Take 20 mg by mouth daily          Allergies:  Tramadol, Naproxen, and Nickel    REVIEW OF SYSTEMS:  CONSTITUTIONAL:  negative  EYES:  negative  HEENT:  negative  RESPIRATORY:  negative  CARDIOVASCULAR:  positive for  Afib, but no presenting symptoms at this time. GASTROINTESTINAL:  negative  ENDOCRINE:  positive for diabetic symptoms including neuropathy  MUSCULOSKELETAL:  negative  NEUROLOGICAL:  negative    PHYSICAL EXAM:  VITALS:  /83   Pulse 83   Temp 98 °F (36.7 °C)   Resp 18   Ht 6' 2\" (1.88 m)   Wt 255 lb 11.7 oz (116 kg)   SpO2 94%   BMI 32.83 kg/m²   CONSTITUTIONAL:  awake, alert, cooperative, no apparent distress, and appears stated age  EYES:  pupils equal, round and reactive to light, extra ocular muscles intact, sclera clear, conjunctiva normal      LOWER EXTREMITY:  VASCULAR: Pedal Pulses present. negative signs of ischemia.   MUSCULOSKELETAL:  positive gross deformity. NEUROLOGIC:  Epicritic sensation, light touch, joint position sense diminished  SKIN:  Open wound to the distal left 2nd toe. Erythema of the 2nd digit and dorsal left foot. Procedure Note  Indications:  Based on my examination of this patient's wound(s)/ulcer(s) today, debridement is required to promote healing and evaluate the wound base. Performed by: Orlin Johnson DPM    Consent obtained:  Yes    Time out taken:  Yes    Pain Control:         Debridement:Excisional Debridement    Using scissors and forceps the wound(s)/ulcer(s) was/were sharply debrided down through and included excision of subcutaneous tissue. Devitalized Tissue Debrided:  fibrin, biofilm, slough and necrotic/eschar    Pre Debridement Measurements:  Are located in the Wound/Ulcer Documentation Flow Sheet    Wound/Ulcer #: 1    Post Debridement Measurements:  Wound/Ulcer Descriptions are Pre Debridement except measurements:  Distal 2nd digit  Dark discoloration to the central aspect with thick hyperkeratotic tissue surrounding and loose nail plate. 0.6 x 0.5 x 0.2cm    Percent of Wound(s)/Ulcer(s) Debrided: 100%    Total Surface Area Debrided:  0.3 sq cm       Diabetic/Pressure/Non Pressure Ulcers only:  Ulcer: Non-Pressure ulcer, fat layer exposed      Estimated Blood Loss:  Minimal    Hemostasis Achieved:  by pressure    Procedural Pain:  0  / 10     Post Procedural Pain:  0 / 10     Response to treatment:  Well tolerated by patient.       I/O:    Intake/Output Summary (Last 24 hours) at 5/17/2022 1325  Last data filed at 5/16/2022 1818  Gross per 24 hour   Intake 1050 ml   Output    Net 1050 ml              Wt Readings from Last 3 Encounters:   05/17/22 255 lb 11.7 oz (116 kg)   04/05/22 246 lb 12.8 oz (111.9 kg)   07/29/21 258 lb (117 kg)       LABS:    Recent Labs     05/16/22  1611 05/17/22  0609   WBC 11.0 6.5   HGB 16.0 14.8   HCT 45.8 42.7    189        Recent Labs     05/17/22  0609   *   K

## 2022-05-17 NOTE — PROGRESS NOTES
Hospitalist Progress Note      PCP: Huong Cano MD    Date of Admission: 5/16/2022    Chief Complaint: Left toe infection    Hospital Course:   Patient is a 67-year-old male who presented to the hospital with an infection of the second toe of the left foot. Treated in urgent care about 3 weeks ago with doxycycline. He has had trauma to his foot from stubbing his toe. Subjective: Patient feels okay good spirits. Seen by podiatry. Patient is about to receive a bedside I&D of the toe infection by Dr Ade Solorio      Medications:  Reviewed    Infusion Medications    sodium chloride      sodium chloride 150 mL/hr at 05/16/22 2150     Scheduled Medications    apixaban  5 mg Oral BID    dilTIAZem  180 mg Oral Daily    flecainide  50 mg Oral BID    lisinopril  20 mg Oral Daily    sertraline  100 mg Oral Daily    sodium chloride flush  5-40 mL IntraVENous 2 times per day    piperacillin-tazobactam  3,375 mg IntraVENous Q8H    thiamine  100 mg Oral Daily    multivitamin  1 tablet Oral Daily     PRN Meds: traZODone, sodium chloride flush, ondansetron **OR** ondansetron, polyethylene glycol, acetaminophen **OR** acetaminophen, sodium chloride, oxyCODONE-acetaminophen **OR** oxyCODONE-acetaminophen, LORazepam **OR** LORazepam **OR** LORazepam **OR** LORazepam **OR** LORazepam **OR** LORazepam **OR** LORazepam **OR** LORazepam, cloNIDine      Intake/Output Summary (Last 24 hours) at 5/17/2022 1518  Last data filed at 5/16/2022 1818  Gross per 24 hour   Intake 1050 ml   Output    Net 1050 ml       Physical Exam Performed:    /83   Pulse 83   Temp 98 °F (36.7 °C)   Resp 18   Ht 6' 2\" (1.88 m)   Wt 255 lb 11.7 oz (116 kg)   SpO2 94%   BMI 32.83 kg/m²     General appearance: No apparent distress, appears stated age and cooperative. HEENT: Pupils equal, round, and reactive to light. Conjunctivae/corneas clear. Neck: Supple, with full range of motion. No jugular venous distention.  Trachea midline. Respiratory:  Normal respiratory effort. Clear to auscultation, bilaterally without Rales/Wheezes/Rhonchi. Cardiovascular: Regular rate and rhythm with normal S1/S2 without murmurs, rubs or gallops. Abdomen: Soft, non-tender, non-distended with normal bowel sounds. Musculoskeletal: No clubbing, cyanosis or edema bilaterally. Full range of motion without deformity. Skin: Second toe of left foot-ulcerated area with mild to moderate erythema  Neurologic:  Neurovascularly intact without any focal sensory/motor deficits. Cranial nerves: II-XII intact, grossly non-focal.  Psychiatric: Alert and oriented, thought content appropriate, normal insight  Capillary Refill: Brisk,3 seconds, normal   Peripheral Pulses: +2 palpable, equal bilaterally       Labs:   Recent Labs     05/16/22  1611 05/17/22  0609   WBC 11.0 6.5   HGB 16.0 14.8   HCT 45.8 42.7    189     Recent Labs     05/16/22  1611 05/17/22  0609   * 135*   K 3.8 4.3   CL 94* 98*   CO2 21 26   BUN 7 9   CREATININE 0.9 0.9   CALCIUM 9.6 9.0     Recent Labs     05/16/22  1611   AST 71*   ALT 60*   BILITOT 0.7   ALKPHOS 156*     No results for input(s): INR in the last 72 hours. No results for input(s): Mary Fisher in the last 72 hours. Urinalysis:      Lab Results   Component Value Date    NITRU Negative 03/06/2020    WBCUA None seen 03/06/2020    RBCUA 0-2 03/06/2020    BLOODU TRACE-LYSED 03/06/2020    SPECGRAV <=1.005 03/06/2020    GLUCOSEU Negative 03/06/2020       Radiology:  XR TOE LEFT (MIN 2 VIEWS)   Final Result   Soft tissue ulcer at the tip of the 2nd toe. No evidence of underlying   osteomyelitis. Assessment/Plan:    Sepsis present on admission-due to foot infection. Improving. Leukocytosis is resolved. Tachycardia has improved. Lactic acidosis is trending down. Blood cultures negative to date  Continue current antibiotics  Patient is to receive source control with local I&D.  Did not feel need for further imaging  Spoke with Dr. Clarence Ernst at this point not convinced patient has osteomyelitis    Left toe soft tissue abscess-continue abx  Await tissue cx result  ID and Podiatry following  Dc abx based on cx results    Hyperglycemia-patient was noted to have elevated blood sugar on admission  He may have some prediabetes  I will check hemoglobin A1c    Alcohol abuse-no evidence of withdrawal presently. Patient is on CIWA protocol    Paroxysmal A. Fib-rate controlled rhythm controlled on flecainide and rate controlled with cardizem CD  Eliquis held due to planned I&D  We will resume Eliquis tomorrow  He has outpatient ablation scheduled in June with his electrophysiologist  His electrophysiologist wants him on DOAC for now given planned ablation. Possibly after ablation this may be stopped as CHADSVASC score is low    DVT Prophylaxis: lovenox  Diet: ADULT DIET;  Regular  Code Status: Full Code    Case d/w patient and Dr Clarence Ernst at bedside    Dispo - home in next day or two  John Lennon MD

## 2022-05-17 NOTE — CONSULTS
Infectious Diseases Inpatient Consult Note      Reason for Consult:  Left 2nd toe infection     Requesting Physician:  Cindy Arauz     Primary Care Physician:  Huong Cano MD    History Obtained From:  Epic and Patient     CHIEF COMPLAINT:     Chief Complaint   Patient presents with    Toe Pain     Patient arrives via walk in with c/o left toe injury. 2-3 weeks ago accidently kicked something. Patient states that he was on doxy for it and completed the course of abx. HISTORY OF PRESENT ILLNESS:  43 y.o. man with A fib, on anticoagulation awaiting ablation, heavy alcohol use 12 cans of beer /day and trying to cut back he works as - sustained Left 2nd toe injury 2 weeks ago in his basement was seen in ED placed on Oral Doxycyline now admitted with worsening foot infection seen by  podiatry and s/p bed side ID and wound cx obtained we are consulted for recommendations. X ray negative for osteomyelitis, and Lactic acid elevation on admit no DM per patient.    Location : Left 2nd to ulcer+ cellulitis+ ulcer+       Quality :  Aching +   Severity  8/10     Duration :2 weeks      Timing : intermittent   Context : Left foot injury +     Modifying factors : none   Associated signs and symptoms:Left foot swelling, redness+         Past Medical History:    Past Medical History:   Diagnosis Date    Anxiety     Arthritis     Hyperlipidemia     Hypertension     Paroxysmal atrial fibrillation (HCC)        Past Surgical History:    Past Surgical History:   Procedure Laterality Date    ENDOSCOPY, COLON, DIAGNOSTIC      KNEE ARTHROSCOPY Left     MOUTH SURGERY      tooth removed    NASAL FRACTURE SURGERY N/A 7/23/2021    CLOSED REDUCTION OF NASAL BONE FRACTURE performed by Dennis Boeck, MD at [place] [address]       Current Medications:    Outpatient Medications Marked as Taking for the 5/16/22 encounter [facility] Encounter)   Medication Sig Dispense Refill    dilTIAZem (CARDIZEM CD) 180 MG extended release capsule Take 180 mg by mouth daily      sertraline (ZOLOFT) 100 MG tablet Take 100 mg by mouth daily         Allergies:  Tramadol, Naproxen, and Nickel    Immunizations : There is no immunization history for the selected administration types on file for this patient. Social History:    Social History     Tobacco Use    Smoking status: Never Smoker    Smokeless tobacco: Current User     Types: Snuff    Tobacco comment: dip   Vaping Use    Vaping Use: Never used   Substance Use Topics    Alcohol use:  Yes     Alcohol/week: 8.0 - 12.0 standard drinks     Types: 8 - 12 Cans of beer per week     Comment: 8-12 beers 5 days/week    Drug use: No     Social History     Tobacco Use   Smoking Status Never Smoker   Smokeless Tobacco Current User    Types: Snuff   Tobacco Comment    dip      Family History   Problem Relation Age of Onset    High Blood Pressure Father     Heart Disease Father     Stroke Father     Cancer Maternal Grandmother     Cancer Maternal Grandfather     Cancer Paternal Grandmother         lung       REVIEW OF SYSTEMS:     Constitutional:  negative for fevers, chills, night sweats  Eyes:  negative for blurred vision, eye discharge, visual disturbance   HEENT:  negative for hearing loss, ear drainage,nasal congestion  Respiratory:  negative for cough, shortness of breath or hemoptysis   Cardiovascular:  negative for chest pain, palpitations, syncope  Gastrointestinal:  negative for nausea, vomiting, diarrhea, constipation, abdominal pain  Genitourinary:  negative for frequency, dysuria, urinary incontinence, hematuria  Hematologic/Lymphatic:  negative for easy bruising, bleeding and lymphadenopathy  Allergic/Immunologic:  negative for recurrent infections, angioedema, anaphylaxis   Endocrine:  negative for weight changes, polyuria, polydipsia and polyphagia  Musculoskeletal:  Left foot pain+ swelling+ ulcer+ , swelling, decreased range of motion  Integumentary: No rashes, skin lesions  Neurological:  negative for headaches, slurred speech, unilateral weakness  Psychiatric: negative for hallucinations,confusion,agitation.      PHYSICAL EXAM:      Vitals:    /83   Pulse 83   Temp 98 °F (36.7 °C)   Resp 18   Ht 6' 2\" (1.88 m)   Wt 255 lb 11.7 oz (116 kg)   SpO2 94%   BMI 32.83 kg/m²     General Appearance: alert,in no acute distress, no pallor, no icterus  Facial flushing +=   Skin: warm and dry, no rash or erythema  Head: normocephalic and atraumatic  Eyes: pupils equal, round, and reactive to light, conjunctivae normal  ENT: tympanic membrane, external ear and ear canal normal bilaterally, nose without deformity, nasal mucosa and turbinates normal without polyps  Neck: supple and non-tender without mass, no thyromegaly  no cervical lymphadenopathy  Pulmonary/Chest: clear to auscultation bilaterally- no wheezes, rales or rhonchi, normal air movement, no respiratory distress  Cardiovascular: normal rate, regular rhythm, normal S1 and S2, no murmurs, rubs, clicks, or gallops, no carotid bruits  Abdomen: soft, non-tender, non-distended, normal bowel sounds, no masses or organomegaly  Extremities: no cyanosis, clubbing or edema  Musculoskeletal: normal range of motion, no joint swelling, deformity or tenderness  Integumentary: No rashes, no abnormal skin lesions, no petechiae  Neurologic: reflexes normal and symmetric, no cranial nerve deficit  Psych:  Orientation, sensorium, mood normal   Lines: IV  Left foot 2nd toe ulcer + cellulitis+   DATA:    CBC:   Lab Results   Component Value Date    WBC 6.5 05/17/2022    HGB 14.8 05/17/2022    HCT 42.7 05/17/2022    MCV 93.6 05/17/2022     05/17/2022     RENAL:   Lab Results   Component Value Date    CREATININE 0.9 05/17/2022    BUN 9 05/17/2022     (L) 05/17/2022    K 4.3 05/17/2022    CL 98 (L) 05/17/2022    CO2 26 05/17/2022     SED RATE: No results found for: SEDRATE  CK: No results found for: CKTOTAL  CRP: No results found for: CRP  Hepatic Function Panel:   Lab Results   Component Value Date    ALKPHOS 156 05/16/2022    ALT 60 05/16/2022    AST 71 05/16/2022    PROT 7.9 05/16/2022    BILITOT 0.7 05/16/2022    BILIDIR <0.2 02/17/2021    IBILI see below 02/17/2021    LABALBU 4.4 05/16/2022     UA:  Lab Results   Component Value Date    COLORU Yellow 03/06/2020    CLARITYU Clear 03/06/2020    GLUCOSEU Negative 03/06/2020    BILIRUBINUR Negative 03/06/2020    KETUA Negative 03/06/2020    SPECGRAV <=1.005 03/06/2020    BLOODU TRACE-LYSED 03/06/2020    PHUR 5.0 03/06/2020    PHUR 5.0 03/06/2020    PROTEINU Negative 03/06/2020    UROBILINOGEN 0.2 03/06/2020    NITRU Negative 03/06/2020    LEUKOCYTESUR Negative 03/06/2020    LABMICR YES 03/06/2020    URINETYPE Voided 03/06/2020      Urine Microscopic:   Lab Results   Component Value Date    WBCUA None seen 03/06/2020    RBCUA 0-2 03/06/2020     Urine Reflex to Culture: No results found for: URRFLXCULT    Lactic acid 3.2       MICRO: cultures reviewed and updated by me       Procedure Component Value Units Date/Time   Culture, Blood 1 [[id number]] Collected: 05/16/22 1611   Order Status: Sent Specimen: Blood Updated: 05/16/22 1658   Culture, Blood 2 [[id number]] Collected: 05/16/22 1611   Order Status: Sent Specimen: Blood Updated: 05/16/22 1658   Culture, Blood 1 [[id number]] Collected: 05/16/22 0000   Order Status: Canceled Specimen: Blood    Culture, Blood 2 [[id number]] Collected: 05/16/22 0000   Order Status: Canceled Specimen: Blood        Blood Culture: No results found for: BC, BLOODCULT2    Viral Culture:    Lab Results   Component Value Date    COVID19 Not Detected 07/23/2021     Urine Culture: No results for input(s): LABURIN in the last 72 hours.     Scheduled Meds:   vancomycin  1,250 mg IntraVENous Q12H    apixaban  5 mg Oral BID    dilTIAZem  180 mg Oral Daily    flecainide  50 mg Oral BID    lisinopril  20 mg Oral Daily    sertraline  100 mg Oral Daily    sodium chloride flush  5-40 mL IntraVENous 2 times per day    piperacillin-tazobactam  3,375 mg IntraVENous Q8H    thiamine  100 mg Oral Daily    multivitamin  1 tablet Oral Daily    vancomycin (VANCOCIN) intermittent dosing (placeholder)   Other RX Placeholder       Continuous Infusions:   sodium chloride      sodium chloride 150 mL/hr at 05/16/22 7654       PRN Meds:  traZODone, sodium chloride flush, ondansetron **OR** ondansetron, polyethylene glycol, acetaminophen **OR** acetaminophen, sodium chloride, oxyCODONE-acetaminophen **OR** oxyCODONE-acetaminophen, LORazepam **OR** LORazepam **OR** LORazepam **OR** LORazepam **OR** LORazepam **OR** LORazepam **OR** LORazepam **OR** LORazepam, cloNIDine    Imaging:   XR TOE LEFT (MIN 2 VIEWS)   Final Result   Soft tissue ulcer at the tip of the 2nd toe. No evidence of underlying   osteomyelitis. All pertinent images and reports for the current Hospitalization were reviewed by me. IMPRESSION:    Patient Active Problem List   Diagnosis    Mixed hyperlipidemia    Right upper quadrant abdominal pain    Fatty liver    Septic prepatellar bursitis of left knee    Prepatellar bursitis of left knee    [facility]    Major depressive disorder, single episode, severe ([address]    Alcohol use disorder, mild, abuse    Intentional ibuprofen overdose ([address]    MDD (major depressive disorder), single episode    Sepsis ([facility]     Left 2nd toe ulcer cellulitis  X ray no osteomyelitis  Lactic acidosis on admit  ? Neuropathy from alcohol abuse  H/o Alcohol abuse  A Fib on anticoagulation awaiting Ablation   Left foot injury   HTN+  S/P Bed side debridement Left 2N toe  Wound cx in process  Lft elevation from alcohol abuse  ? Labs, Microbiology, Radiology and pertinent results from current hospitalization and care every where were reviewed by me as a part of the consultation.     PLAN :  1. Cont IV Zosyn to cover Mixed infectious process  2. Can d/c IV Vancomycin for now  3. He was on oral Doxycycline as out patient per patient   4. On chronic anticoagulation   5. Wound cx in process  6. Able to choose oral abx once foot is looking better  7. ESR, CRP in AM     Discussed with patient/Family and Nursing     Thanks for allowing me to participate in your patient's care please call me with any questions or concerns.     Dr. Rosanna Watts MD  [address] [place] [address] Physician  Phone: [phone]   Fax : [phone]

## 2022-05-18 VITALS
SYSTOLIC BLOOD PRESSURE: 160 MMHG | TEMPERATURE: 98 F | RESPIRATION RATE: 18 BRPM | HEIGHT: 74 IN | DIASTOLIC BLOOD PRESSURE: 82 MMHG | WEIGHT: 257.94 LBS | BODY MASS INDEX: 33.1 KG/M2 | HEART RATE: 60 BPM | OXYGEN SATURATION: 98 %

## 2022-05-18 PROBLEM — E11.9 T2DM (TYPE 2 DIABETES MELLITUS) (HCC): Status: ACTIVE | Noted: 2022-05-18

## 2022-05-18 PROBLEM — E11.621 DIABETIC ULCER OF TOE OF LEFT FOOT (HCC): Status: ACTIVE | Noted: 2022-05-18

## 2022-05-18 PROBLEM — L97.529 DIABETIC ULCER OF TOE OF LEFT FOOT (HCC): Status: ACTIVE | Noted: 2022-05-18

## 2022-05-18 LAB
C-REACTIVE PROTEIN: 26.1 MG/L (ref 0–5.1)
ESTIMATED AVERAGE GLUCOSE: 159.9 MG/DL
HBA1C MFR BLD: 7.2 %
SEDIMENTATION RATE, ERYTHROCYTE: 27 MM/HR (ref 0–15)

## 2022-05-18 PROCEDURE — 86140 C-REACTIVE PROTEIN: CPT

## 2022-05-18 PROCEDURE — 6370000000 HC RX 637 (ALT 250 FOR IP): Performed by: INTERNAL MEDICINE

## 2022-05-18 PROCEDURE — 6360000002 HC RX W HCPCS: Performed by: INTERNAL MEDICINE

## 2022-05-18 PROCEDURE — 2580000003 HC RX 258: Performed by: INTERNAL MEDICINE

## 2022-05-18 PROCEDURE — 99232 SBSQ HOSP IP/OBS MODERATE 35: CPT | Performed by: INTERNAL MEDICINE

## 2022-05-18 PROCEDURE — 36415 COLL VENOUS BLD VENIPUNCTURE: CPT

## 2022-05-18 PROCEDURE — 85652 RBC SED RATE AUTOMATED: CPT

## 2022-05-18 RX ORDER — OXYCODONE HYDROCHLORIDE AND ACETAMINOPHEN 5; 325 MG/1; MG/1
1 TABLET ORAL EVERY 8 HOURS PRN
Qty: 6 TABLET | Refills: 0 | Status: SHIPPED | OUTPATIENT
Start: 2022-05-18 | End: 2022-05-21

## 2022-05-18 RX ORDER — AMOXICILLIN AND CLAVULANATE POTASSIUM 875; 125 MG/1; MG/1
1 TABLET, FILM COATED ORAL 2 TIMES DAILY
Qty: 14 TABLET | Refills: 0 | Status: SHIPPED | OUTPATIENT
Start: 2022-05-18 | End: 2022-05-25

## 2022-05-18 RX ADMIN — SODIUM CHLORIDE: 9 INJECTION, SOLUTION INTRAVENOUS at 01:49

## 2022-05-18 RX ADMIN — PIPERACILLIN AND TAZOBACTAM 3375 MG: 3; .375 INJECTION, POWDER, LYOPHILIZED, FOR SOLUTION INTRAVENOUS at 11:51

## 2022-05-18 RX ADMIN — FLECAINIDE ACETATE 50 MG: 100 TABLET ORAL at 10:34

## 2022-05-18 RX ADMIN — DILTIAZEM HYDROCHLORIDE 180 MG: 180 CAPSULE, COATED, EXTENDED RELEASE ORAL at 10:34

## 2022-05-18 RX ADMIN — LISINOPRIL 20 MG: 20 TABLET ORAL at 10:34

## 2022-05-18 RX ADMIN — APIXABAN 5 MG: 5 TABLET, FILM COATED ORAL at 10:34

## 2022-05-18 RX ADMIN — PIPERACILLIN AND TAZOBACTAM 3375 MG: 3; .375 INJECTION, POWDER, LYOPHILIZED, FOR SOLUTION INTRAVENOUS at 04:23

## 2022-05-18 RX ADMIN — SODIUM CHLORIDE, PRESERVATIVE FREE 10 ML: 5 INJECTION INTRAVENOUS at 10:36

## 2022-05-18 RX ADMIN — THIAMINE HCL TAB 100 MG 100 MG: 100 TAB at 10:34

## 2022-05-18 RX ADMIN — SERTRALINE 100 MG: 100 TABLET, FILM COATED ORAL at 10:34

## 2022-05-18 RX ADMIN — THERA TABS 1 TABLET: TAB at 10:34

## 2022-05-18 ASSESSMENT — PAIN SCALES - GENERAL: PAINLEVEL_OUTOF10: 0

## 2022-05-18 NOTE — PROGRESS NOTES
This RN prepared pt for discharge to home at [phone]. This RN provided discharge education regarding medication regimen, and home care. Pt. Verbalized understanding. Denies questions. Left toe wound present at discharge. Discontinued IV without complications. Pt. tolerated well. Pt refused wheelchair will wait in lobby for wife arrival for transport to home.

## 2022-05-18 NOTE — PLAN OF CARE
Problem: Discharge Planning  Goal: Discharge to home or other facility with appropriate resources  Outcome: Progressing  Flowsheets (Taken 5/18/2022 0427 by Wili Veloz RN)  Discharge to home or other facility with appropriate resources: Identify barriers to discharge with patient and caregiver     Problem: Safety - Adult  Goal: Free from fall injury  Outcome: Progressing  Flowsheets (Taken 5/18/2022 7763)  Free From Fall Injury: Instruct family/caregiver on patient safety     Problem: Chronic Conditions and Co-morbidities  Goal: Patient's chronic conditions and co-morbidity symptoms are monitored and maintained or improved  Outcome: Progressing

## 2022-05-18 NOTE — PROGRESS NOTES
[facility]  Diabetes Education   Progress Note       NAME:  Yair Castaneda RECORD NUMBER:  [id number]  AGE: 43 y.o. GENDER: male  : 1985  TODAY'S DATE:  2022    Subjective   Reason for Diabetic Education Evaluation and Assessment: general diabetes support    Azalia Aguilar is surprised by the diabetes diagnosis. Visit Type: evaluation      Kita Bush is a 43 y.o. male referred by:     [x] Physician  [] Nursing  [] Chart Review   [] Other:     PAST MEDICAL HISTORY        Diagnosis Date    Anxiety     Arthritis     Hyperlipidemia     Hypertension     [facility] ([facility]     T2DM (type 2 diabetes mellitus) ([facility] 2022       PAST SURGICAL HISTORY    Past Surgical History:   Procedure Laterality Date    ENDOSCOPY, COLON, DIAGNOSTIC      KNEE ARTHROSCOPY Left     MOUTH SURGERY      tooth removed    NASAL FRACTURE SURGERY N/A 2021    CLOSED REDUCTION OF NASAL BONE FRACTURE performed by Elliott [facility]ero [facility] at [facility]    Family History   Problem Relation Age of Onset    High Blood Pressure Father     Heart Disease Father     Stroke Father     Cancer Maternal Grandmother     Cancer Maternal Grandfather     Cancer Paternal Grandmother         lung       SOCIAL HISTORY    Social History     Tobacco Use    Smoking status: Never Smoker    Smokeless tobacco: Current User     Types: Snuff    Tobacco comment: dip   Vaping Use    Vaping Use: Never used   Substance Use Topics    Alcohol use: Yes     Alcohol/week: 8.0 - 12.0 standard drinks     Types: 8 - 12 Cans of beer per week     Comment: 8-12 beers 5 days/week    Drug use: No       ALLERGIES    Allergies   Allergen Reactions    Tramadol Hives    Naproxen Rash    Nickel Rash     Pt develops rash when nickel buckle comes in contact with skin.          MEDICATIONS     apixaban  5 mg Oral BID    dilTIAZem  180 mg Oral Daily    flecainide  50 mg Oral BID    lisinopril  20 mg Oral Daily    sertraline  100 mg Oral Daily    sodium chloride flush  5-40 mL IntraVENous 2 times per day    piperacillin-tazobactam  3,375 mg IntraVENous Q8H    thiamine  100 mg Oral Daily    multivitamin  1 tablet Oral Daily       Objective        Patient Active Problem List   Diagnosis Code    Mixed hyperlipidemia E78.2    Right upper quadrant abdominal pain R10.11    Fatty liver K76.0    Septic prepatellar bursitis of left knee M71.162    Prepatellar bursitis of left knee M70.42    [facility] T50.902A    Major depressive disorder, single episode[facility] F32.2    Alcohol use disorder, mild, abuse F10.10    Intentional ibuprofen overdose ([facility] T39.312A    MDD (major depressive disorder), single episode F32.9    Sepsis ([facility] A41.9    T2DM (type 2 diabetes mellitus) ([facility] E11.9    Diabetic ulcer of toe of left foot ([facility] E11.621, L97.529        BP (!) 164/87   Pulse 65   Temp 97.7 °F (36.5 °C) (Oral)   Resp 16   Ht 6' 2\" (1.88 m)   Wt 257 lb 15 oz (117 kg)   SpO2 96%   BMI 33.12 kg/m²     HgBA1c:    Lab Results   Component Value Date    LABA1C 7.2 05/17/2022       No results for input(s): POCGLU in the last 72 hours. BUN/Creatinine:    Lab Results   Component Value Date    BUN 9 05/17/2022    CREATININE 0.9 05/17/2022       Assessment        Diabetes Management and Education    Does the patient have a Primary Care Physician? Yes, Ran Almanza MD       Does the patient require new medication instruction? Yes  Reviewed action, side effects and timing with meals for Metformin. Level of patient/caregiver understanding able to:       [x] Verbalized Understanding   [] Demonstrated Understanding       [] Teach Back       [] Needs Reinforcement     []  Other:        Does the patient/caregiver monitor Blood Glucoses? No: Prefers not to monitor his blood sugar at home. Does the patient/caregiver follow a Meal Plan? No: Drinks water and zero calorie drinks. Prefers a light breakfast.    Recommend making water, unsweetened tea or coffee primary drinks. Identified common carbs. Reviewed importance of eating three meals per day. Target 60 grams carb per meal.      Level of patient/caregiver understanding able to:       [x] Verbalized Understanding   [] Demonstrated Understanding       [] Teach Back       [] Needs Reinforcement     []  Other:      Does the patient/caregiver understand S/S of Hypoglycemia? No:   Reviewed symptoms, prevention and treatment. Level of patient/caregiver understanding able to:       [x] Verbalized Understanding   [] Demonstrated Understanding       [] Teach Back       [] Needs Reinforcement     []  Other:                    Does the patient/caregiver understand S/S of Hyperglycemia? No:     Reviewed symptoms, prevention and treatment. Level of patient/caregiver understanding able to:        [x] Verbalized Understanding   [] Demonstrated Understanding       [] Teach Back       [] Needs Reinforcement     []  Other:           Plan        Ongoing diabetes education and blood glucose monitoring.       The following educational and support materials were provided:  · My contact information  · ADA booklet - Living Well with Diabetes            · The Diabetes Education Program:  Planning Healthy Meals   · Academy of Nutrition and Dietetics handout - Carbohydrate Counting for People with Diabetes       Teaching Time Diabetes Education:  30 minutes     Electronically signed by Lidia Suresh on 5/18/2022 at 4:00 PM

## 2022-05-18 NOTE — CARE COORDINATION
CASE MANAGEMENT DISCHARGE SUMMARY:    DISCHARGE DATE: 05/18/22    DISCHARGED TO HOME     TRANSPORTATION: wife             TIME: on call     Denies needs.     Electronically signed by Casper Young RN on 5/18/2022 at 3:52 PM

## 2022-05-18 NOTE — DISCHARGE INSTR - COC
Continuity of Care Form    Patient Name: Kiran Kyle   :  1985  MRN:  [id number]    Admit date:  2022  Discharge date:  2022    Code Status Order: Full Code   Advance Directives:      Admitting Physician:  Ellis Weiss MD  PCP: Dwight Dickerson MD    Discharging Nurse: Jero Mccain [employer]. [address] Unit/Room#: O4N-9087/2872-50  Discharging Unit Phone Number: [phone]    Emergency Contact:   Extended Emergency Contact Information  Primary Emergency Contact: Yocasta Dorman  Address: [address] [place] [address] Phone: [phone]  Mobile Phone: [phone]  Relation: Spouse   needed? No    Past Surgical History:  Past Surgical History:   Procedure Laterality Date    ENDOSCOPY, COLON, DIAGNOSTIC      KNEE ARTHROSCOPY Left     MOUTH SURGERY      tooth removed    NASAL FRACTURE SURGERY N/A 2021    CLOSED REDUCTION OF NASAL BONE FRACTURE performed by Sly Uribe MD at [place] [address]       Immunization History: There is no immunization history for the selected administration types on file for this patient.     Active Problems:  Patient Active Problem List   Diagnosis Code    Mixed hyperlipidemia E78.2    Right upper quadrant abdominal pain R10.11    Fatty liver K76.0    Septic prepatellar bursitis of left knee M71.162    Prepatellar bursitis of left knee M70.42    Suicidal overdose, initial encounter ([facility] T50.902A    Major depressive disorder, single episode, severe (HCC) F32.2    Alcohol use disorder, mild, abuse F10.10    Intentional ibuprofen overdose ([facility] T39.312A    MDD (major depressive disorder), single episode F32.9    Sepsis ([facility] A41.9       Isolation/Infection:   Isolation            No Isolation          Patient Infection Status       None to display            Nurse Assessment:  Last Vital Signs: BP (!) 164/87   Pulse 65   Temp 97.7 °F (36.5 °C) (Oral)   Resp 16   Ht 6' 2\" (1.88 m)   Wt 257 lb 15 oz (117 kg)   SpO2 96% BMI 33.12 kg/m²     Last documented pain score (0-10 scale): Pain Level: 0  Last Weight:   Wt Readings from Last 1 Encounters:   05/18/22 257 lb 15 oz (117 kg)     Mental Status:  oriented and alert    IV Access:  - None    Nursing Mobility/ADLs:  Walking   Assisted  Transfer  Assisted  Bathing  Assisted  Dressing  Independent  [address]  Assisted  Med Delivery   none    Wound Care Documentation and Therapy:        Elimination:  Continence: Bowel: Yes  Bladder: Yes  Urinary Catheter: None   Colostomy/Ileostomy/Ileal Conduit: No       Date of Last BM: 052/18/2022    Intake/Output Summary (Last 24 hours) at 5/18/2022 1509  Last data filed at 5/18/2022 1036  Gross per 24 hour   Intake 1722.76 ml   Output --   Net 1722.76 ml     I/O last 3 completed shifts: In: 1712.8 [P.O.:240; I.V.:1472.8]  Out: -     Safety Concerns:     None    Impairments/Disabilities:      None    Nutrition Therapy:  Current Nutrition Therapy:   - Oral Diet:  General    Routes of Feeding: Oral  Liquids: Thin Liquids  Daily Fluid Restriction: no  Last Modified Barium Swallow with Video (Video Swallowing Test): not done    Treatments at the Time of Hospital Discharge:   Respiratory Treatments: N/A  Oxygen Therapy:  is not on home oxygen therapy.   Ventilator:    - No ventilator support    Rehab Therapies: Physical Therapy and Occupational Therapy  Weight Bearing Status/Restrictions: No weight bearing restrictions  Other Medical Equipment (for information only, NOT a DME order):  N/A  Other Treatments:  surgical/podiatry wound care    Patient's personal belongings (please select all that are sent with patient):  {[facility] DME Belongings:[id number]}    RN SIGNATURE:  Electronically signed by Deanna Magaña RN on 5/18/22 at 5:40 PM EDT    CASE MANAGEMENT/SOCIAL WORK SECTION    Inpatient Status Date: ***    Readmission Risk Assessment Score:  Readmission Risk              Risk of Unplanned Readmission:  10 Discharging to Facility/ Agency   Name:   Address:  Phone:  Fax:    Dialysis Facility (if applicable)   Name:  Address:  Dialysis Schedule:  Phone:  Fax:    / signature: {Esignature:[id number]}    PHYSICIAN SECTION    Prognosis: Good    Condition at Discharge: Stable    Rehab Potential (if transferring to Rehab): Good    Recommended Labs or Other Treatments After Discharge: Follow-up with PCP within 7 days from discharge, not doing so could have life-threatening consequences. Take medication as instructed;  return to the emergency department if persistent symptoms, experiencing side effects from medication including nausea vomiting or unable to keep medications down.    Follow with ID and podiatry recommended    PHYSICIAN SIGNATURE:  Electronically signed by Trey Clarke MD on 5/18/22 at 3:10 PM EDT

## 2022-05-18 NOTE — PROGRESS NOTES
Infectious Disease Follow up Notes  Admit Date: 5/16/2022  Hospital Day: 3    Antibiotics : IV Zosyn      CHIEF COMPLAINT:     Left 2nd toe infection  Cellulitis  Alcohol abuse    Subjective interval History :  43 y.o. man with A fib, on anticoagulation awaiting ablation, heavy alcohol use 12 cans of beer /day and trying to cut back he works as - sustained Left 2nd toe injury 2 weeks ago in his basement was seen in ED placed on Oral Doxycyline now admitted with worsening foot infection seen by  podiatry and s/p bed side ID and wound cx obtained we are consulted for recommendations. X ray negative for osteomyelitis, and Lactic acid elevation on admit no DM per patient.    Location : Left 2nd to ulcer+ cellulitis+ ulcer+       Quality :  Aching +   Severity  8/10     Duration :2 weeks      Timing : intermittent   Context : Left foot injury +     Modifying factors : none   Associated signs and symptoms:Left foot swelling, redness+     Interval History : Left foot redness better and toe slowly improving having some shakes - and tolerating IV abx ok and wound cx coagulase negative stpah noted       Past Medical History:    Past Medical History:   Diagnosis Date    Anxiety     Arthritis     Hyperlipidemia     Hypertension     Paroxysmal atrial fibrillation (HCC)        Past Surgical History:    Past Surgical History:   Procedure Laterality Date    ENDOSCOPY, COLON, DIAGNOSTIC      KNEE ARTHROSCOPY Left     MOUTH SURGERY      tooth removed    NASAL FRACTURE SURGERY N/A 7/23/2021    CLOSED REDUCTION OF NASAL BONE FRACTURE performed by Capri Padilla MD at [place] [address]       Current Medications:    Outpatient Medications Marked as Taking for the 5/16/22 encounter [facility] HOSPITAL Encounter)   Medication Sig Dispense Refill    dilTIAZem (CARDIZEM CD) 180 MG extended release capsule Take 180 mg by mouth daily      sertraline (ZOLOFT) 100 MG tablet Take 100 mg by mouth daily         Allergies:  Tramadol, Naproxen, and Nickel    Immunizations : There is no immunization history for the selected administration types on file for this patient. Social History:   Social History     Tobacco Use    Smoking status: Never Smoker    Smokeless tobacco: Current User     Types: Snuff    Tobacco comment: dip   Vaping Use    Vaping Use: Never used   Substance Use Topics    Alcohol use:  Yes     Alcohol/week: 8.0 - 12.0 standard drinks     Types: 8 - 12 Cans of beer per week     Comment: 8-12 beers 5 days/week    Drug use: No     Social History     Tobacco Use   Smoking Status Never Smoker   Smokeless Tobacco Current User    Types: Snuff   Tobacco Comment    dip      Family History   Problem Relation Age of Onset    High Blood Pressure Father     Heart Disease Father     Stroke Father     Cancer Maternal Grandmother     Cancer Maternal Grandfather     Cancer Paternal Grandmother         lung        REVIEW OF SYSTEMS:     Constitutional:  negative for fevers, chills, night sweats  Eyes:  negative for blurred vision, eye discharge, visual disturbance   HEENT:  negative for hearing loss, ear drainage,nasal congestion  Respiratory:  negative for cough, shortness of breath or hemoptysis   Cardiovascular:  negative for chest pain, palpitations, syncope  Gastrointestinal:  negative for nausea, vomiting, diarrhea, constipation, abdominal pain  Genitourinary:  negative for frequency, dysuria, urinary incontinence, hematuria  Hematologic/Lymphatic:  negative for easy bruising, bleeding and lymphadenopathy  Allergic/Immunologic:  negative for recurrent infections, angioedema, anaphylaxis   Endocrine:  negative for weight changes, polyuria, polydipsia and polyphagia  Musculoskeletal: Left foot 2Nnd toe infection', swelling, decreased range of motion  Integumentary: No rashes, skin lesions  Neurological:  negative for headaches, slurred speech, unilateral weakness  Psychiatric: negative for hallucinations,confusion,agitation.                 PHYSICAL EXAM:      Vitals:    BP (!) 164/87   Pulse 65   Temp 97.7 °F (36.5 °C) (Oral)   Resp 16   Ht 6' 2\" (1.88 m)   Wt 257 lb 15 oz (117 kg)   SpO2 96%   BMI 33.12 kg/m²     General Appearance: alert,in no acute distress, no pallor, no icterus  Facial flushing +=   Skin: warm and dry, no rash or erythema  Head: normocephalic and atraumatic  Eyes: pupils equal, round, and reactive to light, conjunctivae normal  ENT: tympanic membrane, external ear and ear canal normal bilaterally, nose without deformity, nasal mucosa and turbinates normal without polyps  Neck: supple and non-tender without mass, no thyromegaly  no cervical lymphadenopathy  Pulmonary/Chest: clear to auscultation bilaterally- no wheezes, rales or rhonchi, normal air movement, no respiratory distress  Cardiovascular: normal rate, regular rhythm, normal S1 and S2, no murmurs, rubs, clicks, or gallops, no carotid bruits  Abdomen: soft, non-tender, non-distended, normal bowel sounds, no masses or organomegaly  Extremities: no cyanosis, clubbing or edema  Musculoskeletal: normal range of motion, no joint swelling, deformity or tenderness  Integumentary: No rashes, no abnormal skin lesions, no petechiae  Neurologic: reflexes normal and symmetric, no cranial nerve deficit  Psych:  Orientation, sensorium, mood normal            Lines: IV  Left foot 2nd toe ulcer + cellulitis+     Data Review:    CBC:   Lab Results   Component Value Date    WBC 6.5 05/17/2022    HGB 14.8 05/17/2022    HCT 42.7 05/17/2022    MCV 93.6 05/17/2022     05/17/2022     RENAL:   Lab Results   Component Value Date    CREATININE 0.9 05/17/2022    BUN 9 05/17/2022     (L) 05/17/2022    K 4.3 05/17/2022    CL 98 (L) 05/17/2022    CO2 26 05/17/2022     SED RATE:   Lab Results   Component Value Date    SEDRATE 27 05/18/2022     CK: No results found for: CKTOTAL  CRP: Lab Results   Component Value Date    CRP 26.1 05/18/2022     Hepatic Function Panel:   Lab Results   Component Value Date    ALKPHOS 156 05/16/2022    ALT 60 05/16/2022    AST 71 05/16/2022    PROT 7.9 05/16/2022    BILITOT 0.7 05/16/2022    BILIDIR <0.2 02/17/2021    IBILI see below 02/17/2021    LABALBU 4.4 05/16/2022     UA:  Lab Results   Component Value Date    COLORU Yellow 03/06/2020    CLARITYU Clear 03/06/2020    GLUCOSEU Negative 03/06/2020    BILIRUBINUR Negative 03/06/2020    KETUA Negative 03/06/2020    SPECGRAV <=1.005 03/06/2020    BLOODU TRACE-LYSED 03/06/2020    PHUR 5.0 03/06/2020    PHUR 5.0 03/06/2020    PROTEINU Negative 03/06/2020    UROBILINOGEN 0.2 03/06/2020    NITRU Negative 03/06/2020    LEUKOCYTESUR Negative 03/06/2020    LABMICR YES 03/06/2020    URINETYPE Voided 03/06/2020      Urine Microscopic:   Lab Results   Component Value Date    WBCUA None seen 03/06/2020    RBCUA 0-2 03/06/2020     Urine Reflex to Culture: No results found for: URRFLXCULT      MICRO: cultures r  Procedure Component Value Units Date/Time   Culture, Wound [[id number]] (Abnormal) Collected: 05/17/22 1404   Order Status: Completed Specimen: Foot, Left Updated: 05/18/22 1331    Gram Stain Result 2+ Gram positive cocci   1+ WBC's (Polymorphonuclear)   No Epithelial Cells seen    Abnormal     Organism Staphylococcus coagulase-negative Abnormal     WOUND/ABSCESS --    Light growth   No further workup    Narrative:     ORDER#: [id number]                          ORDERED BY: JACKELINE COX   SOURCE: Foot                               COLLECTED:  05/17/22 14:04   ANTIBIOTICS AT TL. :                      RECEIVED :  05/17/22 14:11   Culture, Blood 1 [[id number]] Collected: 05/16/22 1611   Order Status: Completed Specimen: Blood Updated: 05/17/22 1715    Blood Culture, Routine No Growth to date.  Any change in status will be called.    Narrative:     ORDER#: [id number]                          ORDERED BY: Judson Garcia SOURCE: Blood                              COLLECTED:  05/16/22 16:11   ANTIBIOTICS AT TL. :                      RECEIVED :  05/16/22 16:28   If child <=2 yrs old please draw pediatric bottle. ~Blood Culture 1   Culture, Blood 2 [[id number]] Collected: 05/16/22 1611   Order Status: Completed Specimen: Blood Updated: 05/17/22 1715    Culture, Blood 2 No Growth to date.  Any change in status will be called. Narrative:     ORDER#: [id number]                          ORDERED BY: ANDRES BRUNO   SOURCE: Blood                              COLLECTED:  05/16/22 16:11   ANTIBIOTICS AT TL. :                      RECEIVED :  05/16/22 16:22   If child <=2 yrs old please draw pediatric bottle. ~Blood Culture #2   Culture, Blood 1 [[id number]] Collected: 05/16/22 0000   Order Status: Canceled Specimen: Blood    Culture, Blood 2 [[id number]] Collected: 05/16/22 0000   Order Status: Canceled Specimen: Blood      eviewed and updated by me   Blood Culture:   Lab Results   Component Value Date    [facility]  05/16/2022     No Growth to date. Any change in status will be called. BLOODCULT2  05/16/2022     No Growth to date. Any change in status will be called. Respiratory Culture:  Lab Results   Component Value Date    LABGRAM  05/17/2022     2+ Gram positive cocci  1+ WBC's (Polymorphonuclear)  No Epithelial Cells seen       AFB:No results found for: AFBSMEAR  Viral Culture:  Lab Results   Component Value Date    COVID19 Not Detected 07/23/2021     Urine Culture: No results for input(s): Aakash Pelaez in the last 72 hours. IMAGING:    XR TOE LEFT (MIN 2 VIEWS)   Final Result   Soft tissue ulcer at the tip of the 2nd toe. No evidence of underlying   osteomyelitis.                All the pertinent images and reports for the current Hospitalization were reviewed by me     Scheduled Meds:   apixaban  5 mg Oral BID    dilTIAZem  180 mg Oral Daily    flecainide  50 mg Oral BID    lisinopril  20 mg Oral Daily    sertraline  100 mg Oral Daily    sodium chloride flush  5-40 mL IntraVENous 2 times per day    piperacillin-tazobactam  3,375 mg IntraVENous Q8H    thiamine  100 mg Oral Daily    multivitamin  1 tablet Oral Daily       Continuous Infusions:   sodium chloride         PRN Meds:  traZODone, sodium chloride flush, ondansetron **OR** ondansetron, polyethylene glycol, acetaminophen **OR** acetaminophen, sodium chloride, oxyCODONE-acetaminophen **OR** oxyCODONE-acetaminophen, LORazepam **OR** LORazepam **OR** LORazepam **OR** LORazepam **OR** LORazepam **OR** LORazepam **OR** LORazepam **OR** LORazepam, cloNIDine      Assessment:     Patient Active Problem List   Diagnosis    Mixed hyperlipidemia    Right upper quadrant abdominal pain    Fatty liver    Septic prepatellar bursitis of left knee    Prepatellar bursitis of left knee    [facility]    Major depressive disorder, single episode, severe ([facility]    Alcohol use disorder, mild, abuse    Intentional ibuprofen overdose ([facility]    MDD (major depressive disorder), single episode    Sepsis ([facility]     Left 2nd toe ulcer cellulitis  X ray no osteomyelitis  Lactic acidosis on admit  ? Neuropathy from alcohol abuse  H/o Alcohol abuse  A Fib on anticoagulation awaiting Ablation   Left foot injury   HTN+  S/P Bed side debridement Left 2N toe  Wound cx in process  Lft elevation from alcohol abuse  ?         Left foot wound cx coagulase negative staph and will be able to choose oral abx for d/c planning    Needs to Quit alcohol he has follow up with Cardiology about his A fib    New diagnosis of DM as Hba1c > 7.2 and was placed on Metformin with follow up      Labs, Microbiology, Radiology and all the pertinent results from current hospitalization and  care every where were reviewed  by me as a part of the evaluation   Plan:   . Cont IV Zosyn to cover Mixed infectious process  2. Can d/c IV Vancomycin for now  3.  He was on oral Doxycycline as out patient per patient   4. On chronic anticoagulation   5. Wound cx Coagulase negative staph   6. Able to choose oral abx Augmentin x 875 mg twice a day for x 7 days    7. Discussed with patient/Family and Nursing staff     Thanks for allowing me to participate in your patient's care and please call me with any questions or concerns.     Kerline Ojeda MD  Infectious Disease  [facility]) Physician  Phone: [phone]   Fax : [phone]

## 2022-05-18 NOTE — PROGRESS NOTES
Retail pharmacy closed, given augmentin  Paper Rx ordered by Dr. Loida Nguyen at [phone]. Pt advised to return tomorrow to  Rx in Retail Pharmacy during business hours.

## 2022-05-18 NOTE — DISCHARGE SUMMARY
Hospital Medicine Discharge Summary    Patient ID: Vinod Flood      Patient's PCP: Elena Lafleur MD    Admit Date: 5/16/2022     Discharge Date:   05/18/22      Admitting Provider: Sabino Samuels MD     Discharge Provider: Jung Freeman MD     Discharge Diagnoses: Active Hospital Problems    Diagnosis     T2DM (type 2 diabetes mellitus) ([facility] [E11.9]      Priority: Medium    Diabetic ulcer of toe of left foot ([facility] [Z49.829, L97.529]      Priority: Medium    Sepsis ([facility] [A41.9]      Priority: Medium       The patient was seen and examined on day of discharge and this discharge summary is in conjunction with any daily progress note from day of discharge. Hospital Course: 41-year-old male past medical history significant for atrial fibrillation on chronic anticoagulation with Eliquis presents with complaints of wound on second toe of left foot. On x-ray, there was no evidence of osteomyelitis. Patient was evaluated by podiatry. Recommended conservative management with antibiotic course. Cultures taken on admission with coagulase-negative Staphylococcus. Patient most likely will complete treatment at home with oral cephalosporin, as recommended per ID. Patient was informed that he has type 2 diabetes based on hemoglobin A1c of 7.2 and persistent hyperglycemia. He was educated about lifestyle changes including diet and exercise. He will be placed on metformin as an initial therapy. Patient was counseled about following up with primary care physician regarding diabetic follow-up and treatment. Physical Exam Performed:     BP (!) 164/87   Pulse 65   Temp 97.7 °F (36.5 °C) (Oral)   Resp 16   Ht 6' 2\" (1.88 m)   Wt 257 lb 15 oz (117 kg)   SpO2 96%   BMI 33.12 kg/m²       General appearance:  No apparent distress, appears stated age and cooperative. HEENT:  Normal cephalic, atraumatic without obvious deformity. Pupils equal, round, and reactive to light.   Extra ocular muscles intact. Conjunctivae/corneas clear. Neck: Supple, with full range of motion. No jugular venous distention. Trachea midline. Respiratory:  Normal respiratory effort. Clear to auscultation, bilaterally without Rales/Wheezes/Rhonchi. Cardiovascular:  Regular rate and rhythm with normal S1/S2 without murmurs, rubs or gallops. Abdomen: Soft, non-tender, non-distended with normal bowel sounds. Musculoskeletal:  No clubbing, cyanosis or edema bilaterally. Full range of motion without deformity. Skin: Skin color, texture, turgor normal.  No rashes or lesions. Neurologic:  Neurovascularly intact without any focal sensory/motor deficits. Cranial nerves: II-XII intact, grossly non-focal.  Psychiatric:  Alert and oriented, thought content appropriate, normal insight  Capillary Refill: Brisk,< 3 seconds   Peripheral Pulses: +2 palpable, equal bilaterally       Labs: For convenience and continuity at follow-up the following most recent labs are provided:      CBC:    Lab Results   Component Value Date    WBC 6.5 05/17/2022    HGB 14.8 05/17/2022    HCT 42.7 05/17/2022     05/17/2022       Renal:    Lab Results   Component Value Date     05/17/2022    K 4.3 05/17/2022    CL 98 05/17/2022    CO2 26 05/17/2022    BUN 9 05/17/2022    CREATININE 0.9 05/17/2022    CALCIUM 9.0 05/17/2022         Significant Diagnostic Studies    Radiology:   XR TOE LEFT (MIN 2 VIEWS)   Final Result   Soft tissue ulcer at the tip of the 2nd toe. No evidence of underlying   osteomyelitis. Consults:     IP CONSULT TO HOSPITALIST  IP CONSULT TO INFECTIOUS DISEASES  IP CONSULT TO PODIATRY  IP CONSULT TO SOCIAL WORK  IP CONSULT TO DIABETES EDUCATOR    Disposition:  home     Condition at Discharge: Stable    Discharge Instructions/Follow-up:  Follow-up with PCP within 7 days from discharge, not doing so could have life-threatening consequences.    Take medication as instructed;  return to the emergency department if persistent symptoms, experiencing side effects from medication including nausea vomiting or unable to keep medications down. Code Status:  Full Code     Activity: activity as tolerated    Diet: cardiac diet      Discharge Medications:     Current Discharge Medication List           Details   oxyCODONE-acetaminophen (PERCOCET) 5-325 MG per tablet Take 1 tablet by mouth every 8 hours as needed for Pain for up to 3 days. Qty: 6 tablet, Refills: 0    Comments: Reduce doses taken as pain becomes manageable  Associated Diagnoses: Toe infection      metFORMIN (GLUCOPHAGE) 500 MG tablet Take 1 tablet by mouth 2 times daily (with meals)  Qty: 60 tablet, Refills: 5              Details   dilTIAZem (CARDIZEM CD) 180 MG extended release capsule Take 180 mg by mouth daily      sertraline (ZOLOFT) 100 MG tablet Take 100 mg by mouth daily      apixaban (ELIQUIS) 5 MG TABS tablet Take 5 mg by mouth 2 times daily      traZODone (DESYREL) 100 MG tablet Take 100 mg by mouth nightly as needed for Sleep      flecainide (TAMBOCOR) 50 MG tablet Take 50 mg by mouth 2 times daily       lisinopril (PRINIVIL;ZESTRIL) 20 MG tablet Take 20 mg by mouth daily              Time Spent on discharge is more than 30 minutes in the examination, evaluation, counseling and review of medications and discharge plan. Signed:    Sophia Larkin MD   5/18/2022      Thank you Mini Vidal MD for the opportunity to be involved in this patient's care. If you have any questions or concerns, please feel free to contact me at [phone].

## 2022-05-19 ENCOUNTER — TELEPHONE (OUTPATIENT)
Dept: FAMILY MEDICINE CLINIC | Age: 37
End: 2022-05-19

## 2022-05-19 LAB
GRAM STAIN RESULT: ABNORMAL
ORGANISM: ABNORMAL
WOUND/ABSCESS: ABNORMAL

## 2022-05-19 NOTE — TELEPHONE ENCOUNTER
[facility] Trans[facility] Initial Follow Up Call    Outreach made within 2 business days of discharge: Yes    Patient: Leopoldo Goddard Patient : 1985   MRN: [id number]  Reason for Admission: There are no discharge diagnoses documented for the most recent discharge. Discharge Date: 22       Spoke with: 2022 - Left msg for pt to return call. Discharge department/facility: [facility] Interactive Patient Contact:  Was patient able to fill all prescriptions:   Was patient instructed to bring all medications to the follow-up visit:   Is patient taking all medications as directed in the discharge summary? Does patient understand their discharge instructions:   Does patient have questions or concerns that need addressed prior to 7-14 day follow up office visit:     Scheduled appointment with PCP within 7-14 days    Follow Up  No future appointments.     Nicolette Mccullough MA

## 2022-05-20 LAB
BLOOD CULTURE, ROUTINE: NORMAL
CULTURE, BLOOD 2: NORMAL

## 2022-05-27 ENCOUNTER — OFFICE VISIT (OUTPATIENT)
Dept: FAMILY MEDICINE CLINIC | Age: 37
End: 2022-05-27
Payer: COMMERCIAL

## 2022-05-27 VITALS
TEMPERATURE: 98.6 F | SYSTOLIC BLOOD PRESSURE: 138 MMHG | HEIGHT: 74 IN | BODY MASS INDEX: 32.21 KG/M2 | DIASTOLIC BLOOD PRESSURE: 86 MMHG | WEIGHT: 251 LBS

## 2022-05-27 DIAGNOSIS — E11.9 TYPE 2 DIABETES MELLITUS WITHOUT COMPLICATION, WITHOUT LONG-TERM CURRENT USE OF INSULIN (HCC): Primary | ICD-10-CM

## 2022-05-27 DIAGNOSIS — E78.2 MIXED HYPERLIPIDEMIA: ICD-10-CM

## 2022-05-27 DIAGNOSIS — L08.9: ICD-10-CM

## 2022-05-27 DIAGNOSIS — S90.415D: ICD-10-CM

## 2022-05-27 PROCEDURE — 3051F HG A1C>EQUAL 7.0%<8.0%: CPT | Performed by: FAMILY MEDICINE

## 2022-05-27 PROCEDURE — 99214 OFFICE O/P EST MOD 30 MIN: CPT | Performed by: FAMILY MEDICINE

## 2022-05-27 RX ORDER — AMOXICILLIN AND CLAVULANATE POTASSIUM 875; 125 MG/1; MG/1
1 TABLET, FILM COATED ORAL 2 TIMES DAILY
Qty: 20 TABLET | Refills: 0 | Status: SHIPPED | OUTPATIENT
Start: 2022-05-27 | End: 2022-06-06

## 2022-05-27 ASSESSMENT — PATIENT HEALTH QUESTIONNAIRE - PHQ9
5. POOR APPETITE OR OVEREATING: 0
10. IF YOU CHECKED OFF ANY PROBLEMS, HOW DIFFICULT HAVE THESE PROBLEMS MADE IT FOR YOU TO DO YOUR WORK, TAKE CARE OF THINGS AT HOME, OR GET ALONG WITH OTHER PEOPLE: 0
SUM OF ALL RESPONSES TO PHQ QUESTIONS 1-9: 0
1. LITTLE INTEREST OR PLEASURE IN DOING THINGS: 0
3. TROUBLE FALLING OR STAYING ASLEEP: 0
9. THOUGHTS THAT YOU WOULD BE BETTER OFF DEAD, OR OF HURTING YOURSELF: 0
SUM OF ALL RESPONSES TO PHQ9 QUESTIONS 1 & 2: 0
SUM OF ALL RESPONSES TO PHQ QUESTIONS 1-9: 0
7. TROUBLE CONCENTRATING ON THINGS, SUCH AS READING THE NEWSPAPER OR WATCHING TELEVISION: 0
SUM OF ALL RESPONSES TO PHQ QUESTIONS 1-9: 0
8. MOVING OR SPEAKING SO SLOWLY THAT OTHER PEOPLE COULD HAVE NOTICED. OR THE OPPOSITE, BEING SO FIGETY OR RESTLESS THAT YOU HAVE BEEN MOVING AROUND A LOT MORE THAN USUAL: 0
SUM OF ALL RESPONSES TO PHQ QUESTIONS 1-9: 0
2. FEELING DOWN, DEPRESSED OR HOPELESS: 0
6. FEELING BAD ABOUT YOURSELF - OR THAT YOU ARE A FAILURE OR HAVE LET YOURSELF OR YOUR FAMILY DOWN: 0
4. FEELING TIRED OR HAVING LITTLE ENERGY: 0

## 2022-05-27 ASSESSMENT — ENCOUNTER SYMPTOMS: ABDOMINAL PAIN: 0

## 2022-05-27 NOTE — PROGRESS NOTES
Subjective:      Patient ID: Gissel Loaiza is a 43 y.o. male. Chief Complaint   Patient presents with    Follow[facility]Up from [facility]- 'toe infection\"        Patient presents with: Follow-Up from Hospital: [facility]- 'toe infection\"    Here for the above  I have not seen him in over 3 years   It is getting better  No fever no chill  No pain   He took the meds  He tells me he is seeing podiatry but not for few week     Last Saturday had rash start on the thigh and lower abd   Is getting better no itch no pain  No new contacts or exposures    YOB: 1985    Date of Visit:  5/27/2022     -- Tramadol -- Hives   -- Naproxen -- Rash   -- Nickel -- Rash    --  Pt develops rash when nickel buckle comes in             contact with skin.     Current Outpatient Medications:  metFORMIN (GLUCOPHAGE) 500 MG tablet, Take 1 tablet by mouth 2 times daily (with meals), Disp: 60 tablet, Rfl: 5  dilTIAZem (CARDIZEM CD) 180 MG extended release capsule, Take 180 mg by mouth daily, Disp: , Rfl:   sertraline (ZOLOFT) 100 MG tablet, Take 100 mg by mouth daily, Disp: , Rfl:    apixaban (ELIQUIS) 5 MG TABS tablet, Take 5 mg by mouth 2 times daily, Disp: , Rfl:   traZODone (DESYREL) 100 MG tablet, Take 100 mg by mouth nightly as needed for Sleep, Disp: , Rfl:   flecainide (TAMBOCOR) 50 MG tablet, Take 50 mg by mouth 2 times daily , Disp: , Rfl:   lisinopril (PRINIVIL;ZESTRIL) 20 MG tablet, Take 20 mg by mouth daily , Disp: , Rfl:     No current facility-administered medications for this visit.      ---------------------------               05/27/22                      1504         ---------------------------   BP:          138/86         Site:    Left Upper Arm     Position:     Sitting        Cuff Size:   Large Adult      Temp:    98.6 °F (37 °C)    TempSrc:    Temporal        Weight: 251 lb (113.9 kg)   Height:  6' 2\" (1.88 m) ---------------------------  Body mass index is 32.23 kg/m². Wt Readings from Last 3 Encounters:  05/27/22 : 251 lb (113.9 kg)  05/18/22 : 257 lb 15 oz (117 kg)  04/05/22 : 246 lb 12.8 oz (111.9 kg)    BP Readings from Last 3 Encounters:  05/27/22 : 138/86  05/18/22 : (!) 160/82  04/05/22 : 128/83        Review of Systems   Constitutional: Negative for chills and fever. Gastrointestinal: Negative for abdominal pain. Genitourinary: Negative for difficulty urinating. Objective:   Physical Exam  Constitutional:       General: He is not in acute distress. Appearance: Normal appearance. He is not ill-appearing. Pulmonary:      Effort: Pulmonary effort is normal.      Breath sounds: Normal breath sounds. No decreased breath sounds, wheezing, rhonchi or rales. Feet:      Comments: Left second toe he has a callous like area on the tip with a small hole or defect no pus   Mild red on the distal toe   Skin:     General: Skin is warm and dry. Coloration: Skin is not pale. Comments: Few Areas of varying size red small macules on the inner thighs and on the lower abd  No pain no swelling    Neurological:      General: No focal deficit present. Mental Status: He is alert. Assessment:        Diagnosis Orders   1. Type 2 diabetes mellitus without complication, without long-term current use of insulin ([address]     2. Mixed hyperlipidemia     3.  Infected abrasion of fifth toe of left foot, subsequent encounter       Hx of PAF since I last saw patient   I do not see that he is on statin and does have hx of high lipid  We will review at the next visit and likely add treatment   He tells me he is seeing podiatrist dr Rafael Poole but not for some time  While he notes the foot to be better we made arrangements for him to be seen sooner and appointment made for this Tuesday for him to be seen by podiatry   Rash unusual but he notes it to be getting clearly better  Will watch   Discussed him stopping the ETOH   Discussed importance of using additional antibiotic and to follow up with podiatry  We made appt for him    Orders Placed This Encounter   Medications    amoxicillin-clavulanate (AUGMENTIN) 875-125 MG per tablet     Sig: Take 1 tablet by mouth 2 times daily for 10 days     Dispense:  20 tablet     Refill:  0         Lab Results   Component Value Date    LABA1C 7.2 05/17/2022     Lab Results   Component Value Date    .9 05/17/2022         Interval Hx:  1. D/c summary noted on 5/18/22 diabetic ulcer and sepsis and new onset dm and metformin was started in hospital  2. Xray toe ok 5/16/22, cbc ok on 5/17/22 bmp ok except for glucose on same date  3. ENT for nose bleed on 4/5/22  4.  Seeing cardiology for his PAF at [facility] and seen on 3/24/22      Plan:      See dr Paula Murillo  Stay with the antibiotic  Continue to watch the diet and stay with medicines  See us in 3 week         Elena Lafleur MD

## 2022-06-27 NOTE — PROGRESS NOTES
[facility] PRE-SURGICAL TESTING INSTRUCTIONS                                  PRIOR TO PROCEDURE DATE:        1. PLEASE FOLLOW ANY  GUIDELINES/ INSTRUCTIONS PRIOR TO YOUR PROCEDURE AS ADVISED BY YOUR SURGEON. 2. Arrange for someone to drive you home and be with you for the first 24 hours after discharge for your safety after your procedure for which you received sedation. Ensure it is someone we can share information with regarding your discharge. 3. You must contact your surgeon for instructions IF:   You are taking any blood thinners, aspirin, anti-inflammatory or vitamin E.   There is a change in your physical condition such as a cold, fever, rash, cuts, sores or any other infection, especially near your surgical site. 4. Do not drink alcohol the day before or day of your procedure. 5. A Pre-op History and Physical for surgery MUST be completed by your Physician or Urgent Care within 30 days of your procedure date. Please bring a copy with you on the day of your procedure and along with any other testing performed. THE DAY OF YOUR PROCEDURE:  1. Follow instructions for ARRIVAL TIME as DIRECTED BY YOUR SURGEON. 2. Enter the MAIN entrance from [address] and follow the signs to the free [employer] or [employer] parking (offered free of charge 6am-5pm). 3. Enter the Main Entrance of the hospital (do not enter from the lower level of the parking garage). Upon entrance, check in with the  at the main desk on your left. If no one is available at the desk, proceed into the [facility] Waiting Room and go through the door directly into the [facility]. There is a Check-in desk ACROSS from Room 5 (marked with a sign hanging from the ceiling). The phone number for the surgery center is [phone]. 4. Please call [phone] option #2 option #2 if you have not been preregistered yet.   On the day of your procedure bring your insurance card and method of payment, i.e. Check or credit card, if you wish to pay your co-pay the day of surgery. 12. If you are to stay overnight, you may bring a bag with personal items. Please have any large items you may need brought in by your family after your arrival to your hospital room. 15. If you have a Living Will or Durable Power of , please bring a copy on the day of your procedure. 15. With your permission, one family member may accompany you while you are being prepared for surgery. Once you are ready, additional family members may join you. HOW WE KEEP YOU SAFE and WORK TO PREVENT SURGICAL SITE INFECTIONS:  1. Health care workers should always check your ID bracelet to verify your name and birth date. You will be asked many times to state your name, date of birth, and allergies. 2. Health care workers should always clean their hands with soap or alcohol gel before providing care to you. It is okay to ask anyone if they cleaned their hands before they touch you. 3. You will be actively involved in verifying the type of procedure you are having and ensuring the correct surgical site. This will be confirmed multiple times prior to your procedure. Do NOT josé miguel your surgery site UNLESS instructed to by your surgeon. 4. Do not shave or wax for 72 hours prior to procedure near your operative site. Shaving with a razor can irritate your skin and make it easier to develop an infection. On the day of your procedure, any hair that needs to be removed near the surgical site will be clipped by a healthcare worker using a special clippers designed to avoid skin irritation. 5. When you are in the operating room, your surgical site will be cleansed with a special soap, and in most cases, you will be given an antibiotic before the surgery begins. What to expect AFTER YOUR PROCEDURE:  1. Immediately following your procedure, your will be taken to the PACU for the first phase of your recovery. Your nurse will help you recover from any potential side effects of anesthesia, such as extreme drowsiness, changes in your vital signs or breathing patterns. Nausea, headache, muscle aches, or sore throat may also occur after anesthesia. Your nurse will help you manage these potential side effects. 2. For comfort and safety, arrange to have someone at home with you for the first 24 hours after discharge. 3. You and your family will be given written instructions about your diet, activity, dressing care, medications, and return visits. 4. Once at home, should issues with nausea, pain, or bleeding occur, or should you notice any signs of infection, you should call your surgeon. 5. Always clean your hands before and after caring for your wound. Do not let your family touch your surgery site without cleaning their hands. 6. Narcotic pain medications can cause significant constipation. You may want to add a stool softener to your postoperative medication schedule or speak to your surgeon on how best to manage this SIDE EFFECT. SPECIAL INSTRUCTIONS     Thank you for allowing us to care for you. We strive to exceed your expectations in the delivery of care and service provided to you and your family. If you need to contact the [place] [address] staff for any reason, please call us at [phone]    Instructions reviewed with patient during preadmission testing phone interview.   Josi Nash RN.6/27/2022 .9:42 AM      ADDITIONAL EDUCATIONAL INFORMATION REVIEWED PER PHONE WITH YOU AND/OR YOUR FAMILY:  No Hibiclens® Bathing Instructions   Yes Antibacterial Soap

## 2022-06-27 NOTE — PROGRESS NOTES
Place patient label inside box (if no patient label, complete below)  Name:  :  MR#:     Venancio Mckinney / PROCEDURE  1. I (we)Allie authorize  DR Nabil Lima and/or such assistants as may be selected by him/her, to perform the following operation/procedure(s): DISTAL SYMMES AMPUTATION LEFT 2ND TOE        Note: If unable to obtain consent prior to an emergent procedure, document the emergent reason in the medical record. This procedure has been explained to my (our) satisfaction and included in the explanation was:  A) The intended benefit, nature, and extent of the procedure to be performed;  B) The significant risks involved and the probability of success;  C) Alternative procedures and methods of treatment;  D) The dangers and probable consequences of such alternatives (including no procedure or treatment); E) The expected consequences of the procedure on my future health;  F) Whether other qualified individuals would be performing important surgical tasks and/or whether  would be present to advise or support the procedure. I (we) understand that there are other risks of infection and other serious complications in the pre-operative/procedural and postoperative/procedural stages of my (our) care. I (we) have asked all of the questions which I (we) thought were important in deciding whether or not to undergo treatment or diagnosis. These questions have been answered to my (our) satisfaction. I (we) understand that no assurance can be given that the procedure will be a success, and no guarantee or warranty of success has been given to me (us). 2. It has been explained to me (us) that during the course of the operation/procedure, unforeseen conditions may be revealed that necessitate extension of the original procedure(s) or different procedure(s) than those set forth in Paragraph 1.  I (we) authorize and request that the above-named physician, his/her assistants or his/her designees, perform procedures as necessary and desirable if deemed to be in my (our) best interest.     Revised 8/2/2021                                                                          Page 1 of 2           3. I acknowledge that health care personnel may be observing this procedure for the purpose of medical education or other specified purposes as may be necessary as requested and/or approved by my (our) physician. 4. I (we) consent to the disposal by the hospital Pathologist of the removed tissue, parts or organs in accordance with hospital policy. 5. I do ____ do not ____ consent to the use of a local infiltration pain blocking agent that will be used by my provider/surgical provider to help alleviate pain during my procedure. 6. I do ____ do not ____ consent to an emergent blood transfusion in the case of a life-threatening situation that requires blood components to be administered. This consent is valid for 24 hours from the beginning of the procedure. 7. This patient does ____ or does not ____ currently have a DNR status/order. If DNR order is in place, obtain Addendum to the Surgical Consent for ALL Patients with a DNR Order to address ebony-operative status for limited intervention or DNR suspension.      8. I have read and fully understand the above Consent for Operation/Procedure and that all blanks were completed before I signed the consent.   _____________________________       _____________________      ____/____am/pm  Signature of Patient or legal representative      Printed Name / Relationship            Date / Time   ____________________________       _____________________      ____/____am/pm  Witness to Signature                                    Printed Name                    Date / Time     If patient is unable to sign or is a minor, complete the following)  Patient is a minor, ____ years of age, or unable to sign because: ______________________________________________________________________________________________    Chelsi Spurling If a phone consent is obtained, consent will be documented by using two health care professionals, each affirming that the consenting party has no questions and gives consent for the procedure discussed with the physician/provider.   _____________________          ____________________       _____/_____am/pm   2nd witness to phone consent        Printed name           Date / Time    Informed Consent:  I have provided the explanation described above in section 1 to the patient and/or legal representative.  I have provided the patient and/or legal representative with an opportunity to ask any questions about the proposed operation/procedure.   ___________________________          ____________________         ____/____am/pm  Provider / Proceduralist                            Printed name            Date / Time  Revised 8/2/2021                                                                      Page 2 of 2

## 2022-06-28 ENCOUNTER — ANESTHESIA EVENT (OUTPATIENT)
Dept: OPERATING ROOM | Age: 37
End: 2022-06-28
Payer: COMMERCIAL

## 2022-06-28 NOTE — PROGRESS NOTES
Place patient label inside box (if no patient label, complete below)  Name:  :  MR#:   Yazan Rout / PROCEDURE  1. I (we), Annika Gutierrez (Patient Name) authorize Kristine Holley (Provider / Masood Bowman) and/or such assistants as may be selected by him/her, to perform the following operation/procedure(s): DISTAL SYMMES AMPUTATION LEFT 2ND TOE       Note: If unable to obtain consent prior to an emergent procedure, document the emergent reason in the medical record. This procedure has been explained to my (our) satisfaction and included in the explanation was:  A) The intended benefit, nature, and extent of the procedure to be performed;  B) The significant risks involved and the probability of success;  C) Alternative procedures and methods of treatment;  D) The dangers and probable consequences of such alternatives (including no procedure or treatment); E) The expected consequences of the procedure on my future health;  F) Whether other qualified individuals would be performing important surgical tasks and/or whether  would be present to advise or support the procedure. I (we) understand that there are other risks of infection and other serious complications in the pre-operative/procedural and postoperative/procedural stages of my (our) care. I (we) have asked all of the questions which I (we) thought were important in deciding whether or not to undergo treatment or diagnosis. These questions have been answered to my (our) satisfaction. I (we) understand that no assurance can be given that the procedure will be a success, and no guarantee or warranty of success has been given to me (us). 2. It has been explained to me (us) that during the course of the operation/procedure, unforeseen conditions may be revealed that necessitate extension of the original procedure(s) or different procedure(s) than those set forth in Paragraph 1.  I (we) authorize and request that the above-named physician, his/her assistants or his/her designees, perform procedures as necessary and desirable if deemed to be in my (our) best interest.     Revised 8/2/2021                                                                          Page 1 of 2         3. I acknowledge that health care personnel may be observing this procedure for the purpose of medical education or other specified purposes as may be necessary as requested and/or approved by my (our) physician. 4. I (we) consent to the disposal by the hospital Pathologist of the removed tissue, parts or organs in accordance with hospital policy. 5. I do ____ do not ____ consent to the use of a local infiltration pain blocking agent that will be used by my provider/surgical provider to help alleviate pain during my procedure. 6. I do ____ do not ____ consent to an emergent blood transfusion in the case of a life-threatening situation that requires blood components to be administered. This consent is valid for 24 hours from the beginning of the procedure. 7. This patient does ____ or does not ____ currently have a DNR status/order. If DNR order is in place, obtain Addendum to the Surgical Consent for ALL Patients with a DNR Order to address ebony-operative status for limited intervention or DNR suspension.      8. I have read and fully understand the above Consent for Operation/Procedure and that all blanks were completed before I signed the consent.   _____________________________       _____________________      ____/____am/pm  Signature of Patient or legal representative      Printed Name / Relationship            Date / Time   ____________________________       _____________________      ____/____am/pm  Witness to Signature                                    Printed Name                    Date / Time     If patient is unable to sign or is a minor, complete the following)  Patient is a minor, ____ years of age, or unable to sign because:   ______________________________________________________________________________________________    [facility] If a phone consent is obtained, consent will be documented by using two health care professionals, each affirming that the consenting party has no questions and gives consent for the procedure discussed with the physician/provider.   _____________________          ____________________       _____/_____am/pm   2nd witness to phone consent        Printed name           Date / Time    Informed Consent:  I have provided the explanation described above in section 1 to the patient and/or legal representative.  I have provided the patient and/or legal representative with an opportunity to ask any questions about the proposed operation/procedure.   ___________________________          ____________________         ____/____am/pm  Provider / Proceduralist                            Printed name            Date / Time  Revised 8/2/2021                                                                      Page 2 of 2

## 2022-06-29 ENCOUNTER — HOSPITAL ENCOUNTER (OUTPATIENT)
Age: 37
Setting detail: OUTPATIENT SURGERY
Discharge: HOME | End: 2022-06-29
Attending: PODIATRIST | Admitting: PODIATRIST
Payer: COMMERCIAL

## 2022-06-29 ENCOUNTER — APPOINTMENT (OUTPATIENT)
Dept: GENERAL RADIOLOGY | Age: 37
End: 2022-06-29
Attending: PODIATRIST
Payer: COMMERCIAL

## 2022-06-29 ENCOUNTER — ANESTHESIA (OUTPATIENT)
Dept: OPERATING ROOM | Age: 37
End: 2022-06-29
Payer: COMMERCIAL

## 2022-06-29 VITALS
SYSTOLIC BLOOD PRESSURE: 105 MMHG | OXYGEN SATURATION: 96 % | DIASTOLIC BLOOD PRESSURE: 69 MMHG | HEIGHT: 74 IN | BODY MASS INDEX: 30.8 KG/M2 | WEIGHT: 240 LBS | HEART RATE: 55 BPM | TEMPERATURE: 97.1 F | RESPIRATION RATE: 16 BRPM

## 2022-06-29 DIAGNOSIS — M86.9 OSTEOMYELITIS OF SECOND TOE OF LEFT FOOT (HCC): ICD-10-CM

## 2022-06-29 LAB
GLUCOSE BLD-MCNC: 180 MG/DL (ref 70–99)
GLUCOSE BLD-MCNC: 183 MG/DL (ref 70–99)
PERFORMED ON: ABNORMAL
PERFORMED ON: ABNORMAL

## 2022-06-29 PROCEDURE — 2500000003 HC RX 250 WO HCPCS: Performed by: PODIATRIST

## 2022-06-29 PROCEDURE — 7100000011 HC PHASE II RECOVERY - ADDTL 15 MIN: Performed by: PODIATRIST

## 2022-06-29 PROCEDURE — 3700000000 HC ANESTHESIA ATTENDED CARE: Performed by: PODIATRIST

## 2022-06-29 PROCEDURE — 3700000001 HC ADD 15 MINUTES (ANESTHESIA): Performed by: PODIATRIST

## 2022-06-29 PROCEDURE — 7100000000 HC PACU RECOVERY - FIRST 15 MIN: Performed by: PODIATRIST

## 2022-06-29 PROCEDURE — 2580000003 HC RX 258: Performed by: PODIATRIST

## 2022-06-29 PROCEDURE — 7100000010 HC PHASE II RECOVERY - FIRST 15 MIN: Performed by: PODIATRIST

## 2022-06-29 PROCEDURE — 3600000002 HC SURGERY LEVEL 2 BASE: Performed by: PODIATRIST

## 2022-06-29 PROCEDURE — A4217 STERILE WATER/SALINE, 500 ML: HCPCS | Performed by: PODIATRIST

## 2022-06-29 PROCEDURE — 6360000002 HC RX W HCPCS: Performed by: PODIATRIST

## 2022-06-29 PROCEDURE — 3600000012 HC SURGERY LEVEL 2 ADDTL 15MIN: Performed by: PODIATRIST

## 2022-06-29 PROCEDURE — 88305 TISSUE EXAM BY PATHOLOGIST: CPT

## 2022-06-29 PROCEDURE — 7100000001 HC PACU RECOVERY - ADDTL 15 MIN: Performed by: PODIATRIST

## 2022-06-29 PROCEDURE — 2709999900 HC NON-CHARGEABLE SUPPLY: Performed by: PODIATRIST

## 2022-06-29 PROCEDURE — 2580000003 HC RX 258: Performed by: ANESTHESIOLOGY

## 2022-06-29 PROCEDURE — 2580000003 HC RX 258

## 2022-06-29 PROCEDURE — 88311 DECALCIFY TISSUE: CPT

## 2022-06-29 PROCEDURE — 2500000003 HC RX 250 WO HCPCS

## 2022-06-29 PROCEDURE — 6360000002 HC RX W HCPCS

## 2022-06-29 PROCEDURE — 73630 X-RAY EXAM OF FOOT: CPT

## 2022-06-29 RX ORDER — SODIUM CHLORIDE 0.9 % (FLUSH) 0.9 %
5-40 SYRINGE (ML) INJECTION PRN
Status: DISCONTINUED | OUTPATIENT
Start: 2022-06-29 | End: 2022-06-29 | Stop reason: HOSPADM

## 2022-06-29 RX ORDER — KETAMINE HCL IN NACL, ISO-OSM 20 MG/2 ML
SYRINGE (ML) INJECTION PRN
Status: DISCONTINUED | OUTPATIENT
Start: 2022-06-29 | End: 2022-06-29 | Stop reason: SDUPTHER

## 2022-06-29 RX ORDER — PROCHLORPERAZINE EDISYLATE 5 MG/ML
5 INJECTION INTRAMUSCULAR; INTRAVENOUS
Status: DISCONTINUED | OUTPATIENT
Start: 2022-06-29 | End: 2022-06-29 | Stop reason: HOSPADM

## 2022-06-29 RX ORDER — HYDRALAZINE HYDROCHLORIDE 20 MG/ML
10 INJECTION INTRAMUSCULAR; INTRAVENOUS
Status: DISCONTINUED | OUTPATIENT
Start: 2022-06-29 | End: 2022-06-29 | Stop reason: HOSPADM

## 2022-06-29 RX ORDER — MAGNESIUM HYDROXIDE 1200 MG/15ML
LIQUID ORAL CONTINUOUS PRN
Status: DISCONTINUED | OUTPATIENT
Start: 2022-06-29 | End: 2022-06-29 | Stop reason: HOSPADM

## 2022-06-29 RX ORDER — LISINOPRIL 20 MG/1
20 TABLET ORAL DAILY
COMMUNITY

## 2022-06-29 RX ORDER — SODIUM CHLORIDE 9 MG/ML
25 INJECTION, SOLUTION INTRAVENOUS PRN
Status: DISCONTINUED | OUTPATIENT
Start: 2022-06-29 | End: 2022-06-29 | Stop reason: HOSPADM

## 2022-06-29 RX ORDER — FENTANYL CITRATE 50 UG/ML
INJECTION, SOLUTION INTRAMUSCULAR; INTRAVENOUS PRN
Status: DISCONTINUED | OUTPATIENT
Start: 2022-06-29 | End: 2022-06-29 | Stop reason: SDUPTHER

## 2022-06-29 RX ORDER — DOXYCYCLINE HYCLATE 100 MG
100 TABLET ORAL 2 TIMES DAILY
Qty: 20 TABLET | Refills: 0 | Status: SHIPPED | OUTPATIENT
Start: 2022-06-29 | End: 2022-07-09

## 2022-06-29 RX ORDER — BUPIVACAINE HYDROCHLORIDE 5 MG/ML
INJECTION, SOLUTION EPIDURAL; INTRACAUDAL PRN
Status: DISCONTINUED | OUTPATIENT
Start: 2022-06-29 | End: 2022-06-29 | Stop reason: HOSPADM

## 2022-06-29 RX ORDER — SODIUM CHLORIDE, SODIUM LACTATE, POTASSIUM CHLORIDE, CALCIUM CHLORIDE 600; 310; 30; 20 MG/100ML; MG/100ML; MG/100ML; MG/100ML
INJECTION, SOLUTION INTRAVENOUS CONTINUOUS
Status: DISCONTINUED | OUTPATIENT
Start: 2022-06-29 | End: 2022-06-29 | Stop reason: HOSPADM

## 2022-06-29 RX ORDER — SODIUM CHLORIDE 0.9 % (FLUSH) 0.9 %
5-40 SYRINGE (ML) INJECTION EVERY 12 HOURS SCHEDULED
Status: DISCONTINUED | OUTPATIENT
Start: 2022-06-29 | End: 2022-06-29 | Stop reason: HOSPADM

## 2022-06-29 RX ORDER — PROPOFOL 10 MG/ML
INJECTION, EMULSION INTRAVENOUS PRN
Status: DISCONTINUED | OUTPATIENT
Start: 2022-06-29 | End: 2022-06-29 | Stop reason: SDUPTHER

## 2022-06-29 RX ORDER — MIDAZOLAM HYDROCHLORIDE 1 MG/ML
INJECTION INTRAMUSCULAR; INTRAVENOUS PRN
Status: DISCONTINUED | OUTPATIENT
Start: 2022-06-29 | End: 2022-06-29 | Stop reason: SDUPTHER

## 2022-06-29 RX ORDER — ONDANSETRON 2 MG/ML
INJECTION INTRAMUSCULAR; INTRAVENOUS PRN
Status: DISCONTINUED | OUTPATIENT
Start: 2022-06-29 | End: 2022-06-29 | Stop reason: SDUPTHER

## 2022-06-29 RX ORDER — PROPOFOL 10 MG/ML
INJECTION, EMULSION INTRAVENOUS CONTINUOUS PRN
Status: DISCONTINUED | OUTPATIENT
Start: 2022-06-29 | End: 2022-06-29 | Stop reason: SDUPTHER

## 2022-06-29 RX ORDER — MEPERIDINE HYDROCHLORIDE 25 MG/ML
12.5 INJECTION INTRAMUSCULAR; INTRAVENOUS; SUBCUTANEOUS EVERY 5 MIN PRN
Status: DISCONTINUED | OUTPATIENT
Start: 2022-06-29 | End: 2022-06-29 | Stop reason: HOSPADM

## 2022-06-29 RX ADMIN — PHENYLEPHRINE HYDROCHLORIDE 50 MCG: 10 INJECTION, SOLUTION INTRAMUSCULAR; INTRAVENOUS; SUBCUTANEOUS at 09:20

## 2022-06-29 RX ADMIN — PROPOFOL 150 MCG/KG/MIN: 10 INJECTION, EMULSION INTRAVENOUS at 08:18

## 2022-06-29 RX ADMIN — PHENYLEPHRINE HYDROCHLORIDE 50 MCG: 10 INJECTION, SOLUTION INTRAMUSCULAR; INTRAVENOUS; SUBCUTANEOUS at 09:15

## 2022-06-29 RX ADMIN — DEXMEDETOMIDINE HYDROCHLORIDE 8 MCG: 100 INJECTION, SOLUTION INTRAVENOUS at 08:30

## 2022-06-29 RX ADMIN — PROPOFOL 20 MG: 10 INJECTION, EMULSION INTRAVENOUS at 08:22

## 2022-06-29 RX ADMIN — Medication 100 MG: at 08:18

## 2022-06-29 RX ADMIN — MIDAZOLAM HYDROCHLORIDE 2 MG: 2 INJECTION, SOLUTION INTRAMUSCULAR; INTRAVENOUS at 08:14

## 2022-06-29 RX ADMIN — PROPOFOL 30 MG: 10 INJECTION, EMULSION INTRAVENOUS at 08:26

## 2022-06-29 RX ADMIN — CEFAZOLIN 2000 MG: 2 INJECTION, POWDER, FOR SOLUTION INTRAMUSCULAR; INTRAVENOUS at 08:18

## 2022-06-29 RX ADMIN — ONDANSETRON 4 MG: 2 INJECTION INTRAMUSCULAR; INTRAVENOUS at 08:18

## 2022-06-29 RX ADMIN — FENTANYL CITRATE 25 MCG: 50 INJECTION, SOLUTION INTRAMUSCULAR; INTRAVENOUS at 09:10

## 2022-06-29 RX ADMIN — PHENYLEPHRINE HYDROCHLORIDE 100 MCG: 10 INJECTION, SOLUTION INTRAMUSCULAR; INTRAVENOUS; SUBCUTANEOUS at 08:57

## 2022-06-29 RX ADMIN — Medication 20 MG: at 08:32

## 2022-06-29 RX ADMIN — FENTANYL CITRATE 50 MCG: 50 INJECTION, SOLUTION INTRAMUSCULAR; INTRAVENOUS at 08:18

## 2022-06-29 RX ADMIN — FENTANYL CITRATE 25 MCG: 50 INJECTION, SOLUTION INTRAMUSCULAR; INTRAVENOUS at 08:21

## 2022-06-29 RX ADMIN — PROPOFOL 120 MG: 10 INJECTION, EMULSION INTRAVENOUS at 09:00

## 2022-06-29 RX ADMIN — SODIUM CHLORIDE, POTASSIUM CHLORIDE, SODIUM LACTATE AND CALCIUM CHLORIDE: 600; 310; 30; 20 INJECTION, SOLUTION INTRAVENOUS at 07:54

## 2022-06-29 RX ADMIN — PROPOFOL 150 MG: 10 INJECTION, EMULSION INTRAVENOUS at 08:18

## 2022-06-29 RX ADMIN — DEXMEDETOMIDINE HYDROCHLORIDE 8 MCG: 100 INJECTION, SOLUTION INTRAVENOUS at 08:25

## 2022-06-29 RX ADMIN — DEXMEDETOMIDINE HYDROCHLORIDE 4 MCG: 100 INJECTION, SOLUTION INTRAVENOUS at 08:23

## 2022-06-29 RX ADMIN — FENTANYL CITRATE 25 MCG: 50 INJECTION, SOLUTION INTRAMUSCULAR; INTRAVENOUS at 08:24

## 2022-06-29 ASSESSMENT — PAIN - FUNCTIONAL ASSESSMENT
PAIN_FUNCTIONAL_ASSESSMENT: PREVENTS OR INTERFERES SOME ACTIVE ACTIVITIES AND ADLS
PAIN_FUNCTIONAL_ASSESSMENT: 0-10

## 2022-06-29 ASSESSMENT — PAIN SCALES - GENERAL
PAINLEVEL_OUTOF10: 0
PAINLEVEL_OUTOF10: 0

## 2022-06-29 ASSESSMENT — PAIN DESCRIPTION - PAIN TYPE: TYPE: ACUTE PAIN;SURGICAL PAIN

## 2022-06-29 NOTE — OP NOTE
Operative Note      Patient: Bailey Trinh  YOB: 1985  MRN: [id number]    Date of Procedure: 6/29/2022    Pre-Op Diagnosis: [facility]omyelitis of second toe of left foot [facility] [M86.9]    Post-Op Diagnosis: Same       Procedure(s):  DISTAL SYMMES AMPUTATION LEFT 2ND TOE    Procedure # 2:  Flexor tenotomy left 2nd toe    Surgeon(s):  Shreya Schwartz DPM    Assistant:  Resident: Dorian Huffman DPM   Medical Student: Annette Bradshaw MS4     Hemostasis: Pneumatic Ankle Tourniquet (Not inflated) and Surgical Technique      Injectables: Pre-Op 4 cc of 0.5 % Marcaine plain     Materials: 4-0 Nylon     Anesthesia: Monitor Anesthesia Care    Estimated Blood Loss (mL): Minimal    Complications: None    Specimens:   ID Type Source Tests Collected by Time Destination   A : DISTAL PHALANX LEFT 2ND DIGIT Tissue Tissue SURGICAL PATHOLOGY Shreya Schwartz DPM 6/29/2022 0845      Implants:  * No implants in log *      Drains: * No LDAs found *    Findings: Distal phalanx of left second digit was noted to be soft. No purulence noted throughout procedure. Proximal phalanx of left second edges noted to be firm, hard, and white. Adequate bleeding noted throughout procedure. Detailed Description of Procedure:     INDICATIONS FOR PROCEDURE: This patient has signs and symptoms clinically and radiographically consistent with the above mentioned preoperative diagnosis. Having failed conservative treatment, it was determined that the patient would benefit from surgical intervention. All potential risks, benefits, and complications were discussed with the patient prior to the scheduling of surgery. All the patient's questions were answered and no guarantees were given. The patient wished to proceed with surgery, and informed written consent was obtained. DETAILS OF PROCEDURE: The patient was brought from the pre-operative area and placed on the operating table in the supine position.  A pneumatic ankle tourniquet was placed around the patient's well-padded left lower extremity but not inflated. Following IV sedation, the left lower extremity was then scrubbed, prepped, and draped in the usual sterile fashion. A time-out was performed. The patient, procedure, and operative site were confirmed and the following procedure was performed. PROCEDURE #1: DISTAL SYMES AMPUTATION 2nd DIGIT, LEFT    Attention was then directed to the left second digit. Prior to incision a small digital block was done intraoperatively consisting of 4 cc of 0.5% Marcaine plain. Attention was then directed to the second digit on the left foot, and a tennis racket incision was made using an indelible skin marker. The second digit was then stabilized at the distal aspect of the digit using a towel clamp. A #15 surgical blade was used to make a full thickness incision through the skin. The incision was then deepened through the subcutaneous tissue to the level of bone. Next, the distal interphalangeal joint (DIPJ) was identified and attention was then directed to this location. A #15 blade was then used to release the extensor tendon as well as the medial and lateral collateral ligaments present at the DIPJ. The flexor tendon was then severed planarly at the level of the DIPJ and the distal phalanx was removed distally and passed from the operative field and placed on the back table. The distal phalanx was then sent in a formalin cup for pathological analysis. Attention was then directed to the head of the proximal phalanx. The cartilage was noted to white, shiny, pearly and hard, all which are consistent with healthy cartilage. It was determined that no further resection was necessary. At this time, the incision site was flushed using copious amounts of normal saline. Redundant dogears were excised sharply using a #15 blade. Once redundant skin was removed the skin was re-approximated using 4-0 nylon.     PROCEDURE #2: FLEXOR TENOTOMY 2nd DIGIT, LEFT    Attention was directed to the 2nd digit on the left foot. Then using #15 blade a small vertical stab incision was made distal to the sulcus of the 2nd toe. The blade was then advanced and then once on bone the blade was flipped to a more horizontal orientation and was swayed side to side to release the flexor tendon. Close attention was made to stay close to bone as to not cut an interdigital arteries. The tenotomy was confirmed and any remaining fibers were severed by stretching out the 2nd toe manually. Upon completion of tenotomy, the stab incision site then had skin re-approximated using 4-0 nylon in a simple interrupted suture pattern. At this time, a soft sterile dressing was applied consisting of Adaptic, dry gauze, Zohra, and Ace. .     END OF PROCEDURE: The patient tolerated the procedure and anesthesia well and was transported from the operating room to the PACU with vital signs stable and vascular status intact to all aspects of the patient's left lower extremity and digital capillary refill time immediate to the digits of the left foot. Following a period of post-operative monitoring, the patient will be discharged home with written and oral wound care and follow-up instructions. The patient was provided with prescriptions for doxycycline. The patient is to follow-up with Dr. Zeb Cisneros in his private office within 5-7 days. The patient is to keep dressing clean, dry and intact at all times. The patient is to call if any complications occur.     This operative report was dictated on behalf of Dr. Venice Estrada DPM.    Sergio Jackman DPM  Podiatric Resident, PGY-1  Pager #: [phone] or Perfect Serve      Electronically signed by Sergio Jackman DPM on 6/29/2022 at 9:31 AM

## 2022-06-29 NOTE — ANESTHESIA POSTPROCEDURE EVALUATION
Department of Anesthesiology  Postprocedure Note    Patient: Kortney Waite  MRN: [id number]  YOB: 1985  Date of evaluation: 6/29/2022      Procedure Summary     Date: 06/29/22 Room / Location: [address] [place] [address] [facility] / [facility]    Anesthesia Start: 9191 Anesthesia Stop: 1376    Procedure: DISTAL SYMMES AMPUTATION LEFT 2ND TOE (Left ) Diagnosis:       Osteomyelitis of second toe of left foot ([facility])      (Osteomyelitis of second toe of left foot ([facility] [M86.9])    Surgeons: Omaira Justin DPM Responsible Provider: Jenny Dimas DO    Anesthesia Type: MAC ASA Status: 3          Anesthesia Type: MAC    Anthony Phase I: Anthony Score: 10    Anthony Phase II: Anthony Score: 10      Anesthesia Post Evaluation    Patient location during evaluation: PACU  Patient participation: complete - patient participated  Level of consciousness: awake  Pain score: 0  Airway patency: patent  Nausea & Vomiting: no nausea and no vomiting  Complications: no  Cardiovascular status: blood pressure returned to baseline  Respiratory status: acceptable  Hydration status: euvolemic  Multimodal analgesia pain management approach

## 2022-06-29 NOTE — FLOWSHEET NOTE
Notified Dr. Ashley Akers that when patient stood up to get dressed left toe dressing saturated with bright red blood. Dr. Ashley Akers to evaluate patient.

## 2022-06-29 NOTE — H&P
Adelso Strong    [address] Same Day Surgery Update H & P  Department of General Surgery   Surgical Service   Pre-operative History and Physical  Last H & P within the last 30 days. DIAGNOSIS:   Osteomyelitis of second toe of left foot (HCC) [M86.9]    Procedure(s):  DISTAL SYMMES AMPUTATION LEFT 2ND TOE    History obtained from: Patient interview and EHR      HISTORY OF PRESENT ILLNESS:   The patient is a 43 y.o. male with osteomyelitis of the left 2nd toe presents today for the above procedure. Illness Screening: Patient denies fever, chills, worsening cough, or close contact with sick individuals. Past Medical History:        Diagnosis Date    Anxiety     Arthritis     Hyperlipidemia     Hypertension     Paroxysmal [facility] fibrillation ([facility] 12/2019    T[facility]DM [facility]type 2 diabetes mellitus) [facility] 05/18/2022     Past Surgical History:        Procedure Laterality Date    ENDOSCOPY, COLON, DIAGNOSTIC      KNEE ARTHROSCOPY Left     MOUTH SURGERY      tooth removed    NASAL FRACTURE SURGERY N/A 7/23/2021    CLOSED REDUCTION OF NASAL BONE FRACTURE performed by Francisco Guzman MD at [place] [address]       Medications Prior to Admission:      Prior to Admission medications    Medication Sig Start Date End Date Taking?  Authorizing Provider   lisinopril (PRINIVIL;ZESTRIL) 20 MG tablet Take 20 mg by mouth daily   Yes Historical Provider, MD   metFORMIN (GLUCOPHAGE) 500 MG tablet Take 1 tablet by mouth 2 times daily (with meals) 5/18/22   Mandy Miles MD   dilTIAZem (CARDIZEM CD) 180 MG extended release capsule Take 180 mg by mouth daily    Historical Provider, MD   sertraline (ZOLOFT) 100 MG tablet Take 100 mg by mouth daily    Historical Provider, MD   apixaban (ELIQUIS) 5 MG TABS tablet Take 5 mg by mouth 2 times daily 3/25/22   Historical Provider, MD   traZODone (DESYREL) 100 MG tablet Take 100 mg by mouth nightly as needed for Sleep    Historical Provider, MD   flecainide (TAMBOCOR) 50 MG tablet Take 50 mg by mouth 2 times daily  10/31/19   Historical Provider, MD   lisinopril (PRINIVIL;ZESTRIL) 20 MG tablet Take 20 mg by mouth daily  6/30/19 7/23/21  Historical Provider, MD         Allergies:  Naproxen, Nickel, and Tramadol    PHYSICAL EXAM:      BP (!) 147/86   Pulse 72   Temp 99 °F (37.2 °C) (Temporal)   Resp 16   Ht 6' 2\" (1.88 m)   Wt 240 lb (108.9 kg)   SpO2 96%   BMI 30.81 kg/m²      Airway:  Airway patent with no audible stridor    Heart:  Regular rate and rhythm, No murmur noted    Lungs:  No increased work of breathing, good air exchange, clear to auscultation bilaterally, no crackles or wheezing    Abdomen:  Soft, non-distended, non-tender, no rebound tenderness or guarding, and no masses palpated    ASSESSMENT AND PLAN     Patient is a 43 y.o. male with above specified procedure planned. 1.  The patients history and physical was obtained and signed off by the pre-admission testing department. Patient seen and focused exam done today- no new changes since last physical exam on 5/27/22    2. Access to ancillary services are available per request of the provider.     TIGRE Schmitt - CNP     6/29/2022

## 2022-06-29 NOTE — PROGRESS NOTES
PACU Transfer to Hospitals in [place]  #5    Procedure(s):  DISTAL SYMMES AMPUTATION LEFT 2ND TOE    Pt's Current Allergies: Naproxen, Nickel, and Tramadol    Pt meets criteria to transfer to next phase of care per ALEJANDRO SCORE and ASPAN standards    Recent Labs     06/29/22  0752 06/29/22  0940   POCGLU 180* 183*       Vitals:    06/29/22 1015   BP: 99/68   Pulse: 57   Resp: 15   Temp: 96.9 °F (36.1 °C)   SpO2: 98%         Intake/Output Summary (Last 24 hours) at 6/29/2022 1022  Last data filed at 6/29/2022 1015  Gross per 24 hour   Intake 952 ml   Output --   Net 952 ml     Drank diet soda  Ate jello    Pain assessment:  none  Pain Level: 0    Patient was assessed for unknown alterations to skin integrity. There were not unknown alterations observed. Patient transferred to care of Colt Espinal RN.  Via handoff sheet  Family updated and directed to Colt Espinal  Via waiting room staff    6/29/2022 10:22 AM

## 2022-06-29 NOTE — ANESTHESIA PRE PROCEDURE
Department of Anesthesiology  Preprocedure Note       Name:  Dottie Doyle   Age:  43 y.o.  :  1985                                          MRN:  [id number]         Date:  2022      Surgeon: Delfino Lubin):  Rossy Goodman DPM    Procedure: Procedure(s):  DISTAL SYMMES AMPUTATION LEFT 2ND TOE    Medications prior to admission:   Prior to Admission medications    Medication Sig Start Date End Date Taking? Authorizing Provider   metFORMIN (GLUCOPHAGE) 500 MG tablet Take 1 tablet by mouth 2 times daily (with meals) 22   Jenaro Ledbetter MD   dilTIAZem (CARDIZEM CD) 180 MG extended release capsule Take 180 mg by mouth daily    Historical Provider, MD   sertraline (ZOLOFT) 100 MG tablet Take 100 mg by mouth daily    Historical Provider, MD   apixaban (ELIQUIS) 5 MG TABS tablet Take 5 mg by mouth 2 times daily 3/25/22   Historical Provider, MD   traZODone (DESYREL) 100 MG tablet Take 100 mg by mouth nightly as needed for Sleep    Historical Provider, MD   flecainide (TAMBOCOR) 50 MG tablet Take 50 mg by mouth 2 times daily  10/31/19   Historical Provider, MD   lisinopril (PRINIVIL;ZESTRIL) 20 MG tablet Take 20 mg by mouth daily  19  Historical Provider, MD       Current medications:    Current Facility-Administered Medications   Medication Dose Route Frequency Provider Last Rate Last Admin    ceFAZolin (ANCEF) 2,000 mg in sodium chloride 0.9 % 50 mL IVPB (mini-bag)  2,000 mg IntraVENous Once Rossy Goodman DPM        lactated ringers infusion   IntraVENous Continuous Piero Sahu, DO           Allergies: Allergies   Allergen Reactions    Tramadol Hives    Naproxen Rash    Nickel Rash     Pt develops rash when nickel buckle comes in contact with skin.          Problem List:    Patient Active Problem List   Diagnosis Code    Mixed hyperlipidemia E78.2    Right upper quadrant abdominal pain R10.11    Fatty liver K76.0    Septic prepatellar bursitis of left knee M71.162    Prepatellar bursitis of left knee M70.42    [facility] overdose[facility] T50.902A    Major depressive disorder, single episode, severe (HCC) F32.2    Alcohol use disorder, mild, abuse F10.10    Intentional ibuprofen overdose ([facility] T39.312A    MDD (major depressive disorder), single episode F32.9    Sepsis ([facility] A41.9    T2DM (type 2 diabetes mellitus) ([facility] E11.9    Diabetic ulcer of toe of left foot ([facility] E11.621, L97.529       Past Medical History:        Diagnosis Date    Anxiety     Arthritis     Hyperlipidemia     Hypertension     [facility] fibrillation ([facility]     T2DM (type 2 diabetes mellitus) ([facility] 5/18/2022       Past Surgical History:        Procedure Laterality Date    ENDOSCOPY, COLON, DIAGNOSTIC      KNEE ARTHROSCOPY Left     MOUTH SURGERY      tooth removed    NASAL FRACTURE SURGERY N/A 7/23/2021    CLOSED REDUCTION OF NASAL BONE FRACTURE performed by Dedrick Powers MD at [address] [place] [address] History:    Social History     Tobacco Use    Smoking status: Never Smoker    Smokeless tobacco: Current User     Types: Snuff    Tobacco comment: dip   Substance Use Topics    Alcohol use: Not Currently     Alcohol/week: 8.0 - 12.0 standard drinks     Types: 8 - 12 Cans of beer per week     Comment: 8-12 beers 5 days/week                                Ready to quit: Not Answered  Counseling given: Not Answered  Comment: dip      Vital Signs (Current):   Vitals:    06/27/22 0939 06/29/22 0719   BP:  (!) 147/86   Pulse:  72   Resp:  16   Temp:  99 °F (37.2 °C)   TempSrc:  Temporal   SpO2:  96%   Weight: 238 lb (108 kg) 240 lb (108.9 kg)   Height: 6' 2\" (1.88 m) 6' 2\" (1.88 m)                                              BP Readings from Last 3 Encounters:   06/29/22 (!) 147/86   05/27/22 138/86   05/18/22 (!) 160/82       NPO Status:                                                                                 BMI:   Wt Readings from Last 3 Encounters: 06/29/22 240 lb (108.9 kg)   05/27/22 251 lb (113.9 kg)   05/18/22 257 lb 15 oz (117 kg)     Body mass index is 30.81 kg/m². CBC:   Lab Results   Component Value Date    WBC 6.5 05/17/2022    RBC 4.56 05/17/2022    HGB 14.8 05/17/2022    HCT 42.7 05/17/2022    MCV 93.6 05/17/2022    RDW 12.5 05/17/2022     05/17/2022       CMP:   Lab Results   Component Value Date     05/17/2022    K 4.3 05/17/2022    CL 98 05/17/2022    CO2 26 05/17/2022    BUN 9 05/17/2022    CREATININE 0.9 05/17/2022    GFRAA >60 05/17/2022    AGRATIO 1.3 05/16/2022    LABGLOM >60 05/17/2022    GLUCOSE 238 05/17/2022    GLUCOSE 202 03/24/2022    PROT 7.9 05/16/2022    CALCIUM 9.0 05/17/2022    BILITOT 0.7 05/16/2022    ALKPHOS 156 05/16/2022    AST 71 05/16/2022    ALT 60 05/16/2022       POC Tests: No results for input(s): POCGLU, POCNA, POCK, POCCL, POCBUN, POCHEMO, POCHCT in the last 72 hours.     Coags: No results found for: PROTIME, INR, APTT    HCG (If Applicable): No results found for: PREGTESTUR, PREGSERUM, HCG, HCGQUANT     ABGs: No results found for: PHART, PO2ART, MBI3YXZ, ASA7KNE, BEART, V9VXSXYU     Type & Screen (If Applicable):  No results found for: LABABO, LABRH    Drug/Infectious Status (If Applicable):  No results found for: HIV, HEPCAB    COVID-19 Screening (If Applicable):   Lab Results   Component Value Date    COVID19 Not Detected 07/23/2021           Anesthesia Evaluation  Patient summary reviewed and Nursing notes reviewed no history of anesthetic complications:   Airway: Mallampati: III  TM distance: >3 FB   Neck ROM: full  Mouth opening: > = 3 FB   Dental: normal exam         Pulmonary:normal exam                               Cardiovascular:  Exercise tolerance: good (>4 METS),   (+) hypertension:, dysrhythmias: atrial fibrillation,     (-)  angina, orthopnea and PND    ECG reviewed  Rhythm: irregular  Rate: abnormal           Beta Blocker:  Not on Beta Blocker         Neuro/Psych: GI/Hepatic/Renal:        (-) liver disease       Endo/Other:    (+) DiabetesType II DM, poorly controlled, , .                 Abdominal:   (+) obese,     Abdomen: soft. Vascular: Other Findings:           Anesthesia Plan      MAC     ASA 3       Induction: intravenous. MIPS: Postoperative opioids intended and Prophylactic antiemetics administered. Anesthetic plan and risks discussed with patient. Plan discussed with CRNA and attending.                     Mary Martínez DO   6/29/2022

## 2022-06-29 NOTE — PROGRESS NOTES
Nena Moody    Current Allergies: Naproxen, Nickel, and Tramadol    Recent Labs     06/29/22  0752 06/29/22  0940   POCGLU 180* 183*       Admitted to PACU bed 10 from OR. Arrived on a stretcher . Attached to PACU monitoring system. Alarms and parameters set. Report received from anesthesia personnel [address]. OR staff did not report skin issues that were observed while in OR  No problems reported intraoperatively. Pt arrived with oxygen per high flow nasal cannula  with oxygen at 3 liters. Athrombic wraps in place. Removed for discharge    Doctors aware of all labs before coming to recovery.

## 2022-06-29 NOTE — BRIEF OP NOTE
Brief Postoperative Note      Patient: Cindy Black  YOB: 1985  MRN: [id number]    Date of Procedure: 6/29/2022    Pre-Op Diagnosis: Osteomyelitis of second toe of left foot ([address] [M86.9]    Post-Op Diagnosis: Same       Procedure(s):  DISTAL SYMMES AMPUTATION LEFT 2ND TOE    Surgeon(s):  Cora Arreaga DPM    Assistant:  Resident: Rao Hernandez DPM   Medical Student: Maria Dolores Mercado MS4    Hemostasis: Pneumatic Ankle Tourniquet (Not inflated) and Surgical Technique     Injectables: Pre-Op 4 cc of 0.5 % Marcaine plain    Materials: 4-0 Nylon    Anesthesia: Monitor Anesthesia Care    Estimated Blood Loss (mL): Minimal    Complications: None    Specimens:   ID Type Source Tests Collected by Time Destination   A : DISTAL PHALANX LEFT 2ND DIGIT Tissue Tissue SURGICAL PATHOLOGY Cora Arreaga DPM 6/29/2022 0845        Implants:  * No implants in log *      Drains: * No LDAs found *    Findings: See Op Note    Electronically signed by Rao Hernandez DPM on 6/29/2022 at 9:30 AM

## 2023-02-21 ENCOUNTER — OFFICE VISIT (OUTPATIENT)
Dept: FAMILY MEDICINE CLINIC | Age: 38
End: 2023-02-21
Payer: COMMERCIAL

## 2023-02-21 VITALS
DIASTOLIC BLOOD PRESSURE: 82 MMHG | SYSTOLIC BLOOD PRESSURE: 128 MMHG | BODY MASS INDEX: 31.44 KG/M2 | TEMPERATURE: 98.2 F | WEIGHT: 245 LBS | HEART RATE: 72 BPM | HEIGHT: 74 IN

## 2023-02-21 DIAGNOSIS — E78.2 MIXED HYPERLIPIDEMIA: ICD-10-CM

## 2023-02-21 DIAGNOSIS — R20.2 TINGLING OF BOTH FEET: ICD-10-CM

## 2023-02-21 DIAGNOSIS — E11.9 TYPE 2 DIABETES MELLITUS WITHOUT COMPLICATION, WITHOUT LONG-TERM CURRENT USE OF INSULIN (HCC): Primary | ICD-10-CM

## 2023-02-21 PROCEDURE — 99214 OFFICE O/P EST MOD 30 MIN: CPT | Performed by: FAMILY MEDICINE

## 2023-02-21 ASSESSMENT — PATIENT HEALTH QUESTIONNAIRE - PHQ9
3. TROUBLE FALLING OR STAYING ASLEEP: 0
5. POOR APPETITE OR OVEREATING: 0
SUM OF ALL RESPONSES TO PHQ QUESTIONS 1-9: 0
SUM OF ALL RESPONSES TO PHQ QUESTIONS 1-9: 0
2. FEELING DOWN, DEPRESSED OR HOPELESS: 0
9. THOUGHTS THAT YOU WOULD BE BETTER OFF DEAD, OR OF HURTING YOURSELF: 0
7. TROUBLE CONCENTRATING ON THINGS, SUCH AS READING THE NEWSPAPER OR WATCHING TELEVISION: 0
SUM OF ALL RESPONSES TO PHQ QUESTIONS 1-9: 0
4. FEELING TIRED OR HAVING LITTLE ENERGY: 0
6. FEELING BAD ABOUT YOURSELF - OR THAT YOU ARE A FAILURE OR HAVE LET YOURSELF OR YOUR FAMILY DOWN: 0
1. LITTLE INTEREST OR PLEASURE IN DOING THINGS: 0
10. IF YOU CHECKED OFF ANY PROBLEMS, HOW DIFFICULT HAVE THESE PROBLEMS MADE IT FOR YOU TO DO YOUR WORK, TAKE CARE OF THINGS AT HOME, OR GET ALONG WITH OTHER PEOPLE: 0
SUM OF ALL RESPONSES TO PHQ QUESTIONS 1-9: 0
SUM OF ALL RESPONSES TO PHQ9 QUESTIONS 1 & 2: 0
8. MOVING OR SPEAKING SO SLOWLY THAT OTHER PEOPLE COULD HAVE NOTICED. OR THE OPPOSITE, BEING SO FIGETY OR RESTLESS THAT YOU HAVE BEEN MOVING AROUND A LOT MORE THAN USUAL: 0

## 2023-02-21 ASSESSMENT — ENCOUNTER SYMPTOMS
CONSTIPATION: 0
SHORTNESS OF BREATH: 0
DIARRHEA: 0
BLOOD IN STOOL: 0
ABDOMINAL PAIN: 0

## 2023-02-21 NOTE — PROGRESS NOTES
Subjective:      Patient ID: Luis Anderson is a 42 y.o. male. Chief Complaint   Patient presents with    Check-Up     Diabetes, lipids        Patient presents with:  Check-Up: Diabetes, lipids    He is well and no c/o  He decided to stop the metformin  He is staying with the diet     He notes for a year or so some tingling in the left leg lower  Occ right foot tingling at times    YOB: 1985    Date of Visit:  2/21/2023     -- Naproxen -- Rash   -- Nickel -- Rash    --  Pt develops rash when nickel buckle comes in             contact with skin. -- Tramadol -- Hives    Current Outpatient Medications:  lisinopril (PRINIVIL;ZESTRIL) 20 MG tablet, Take 20 mg by mouth daily, Disp: , Rfl:   dilTIAZem (CARDIZEM CD) 180 MG extended release capsule, Take 180 mg by mouth daily, Disp: , Rfl:   sertraline (ZOLOFT) 100 MG tablet, Take 100 mg by mouth daily, Disp: , Rfl:    apixaban (ELIQUIS) 5 MG TABS tablet, Take 5 mg by mouth 2 times daily, Disp: , Rfl:   traZODone (DESYREL) 100 MG tablet, Take 100 mg by mouth nightly as needed for Sleep, Disp: , Rfl:   flecainide (TAMBOCOR) 50 MG tablet, Take 50 mg by mouth 2 times daily , Disp: , Rfl:     No current facility-administered medications for this visit.      ---------------------------               02/21/23                      1512         ---------------------------   BP:          128/82         Site:    Left Upper Arm     Position:     Sitting        Cuff Size:   Large Adult      Pulse:         72           Temp:   98.2 °F (36.8 °C)   TempSrc:    Temporal        Weight: 245 lb (111.1 kg)   Height:  6' 2\" (1.88 m)    ---------------------------  Body mass index is 31.46 kg/m².      Wt Readings from Last 3 Encounters:  02/21/23 : 245 lb (111.1 kg)  06/29/22 : 240 lb (108.9 kg)  05/27/22 : 251 lb (113.9 kg)    BP Readings from Last 3 Encounters:  02/21/23 : 128/82  06/29/22 : 105/69  05/27/22 : 138/86              Review of Systems   Constitutional:  Negative for chills and fever. Respiratory:  Negative for shortness of breath. Cardiovascular:  Negative for chest pain and palpitations. Gastrointestinal:  Negative for abdominal pain, blood in stool, constipation and diarrhea. Genitourinary:  Negative for difficulty urinating, dysuria and hematuria. Musculoskeletal:         Feet ok   Seeing dr phipps    Neurological:  Negative for headaches. Objective:   Physical Exam  Constitutional:       General: He is not in acute distress. Appearance: Normal appearance. He is well-developed. He is not ill-appearing or diaphoretic. Cardiovascular:      Rate and Rhythm: Normal rate and regular rhythm. Heart sounds: Normal heart sounds. No murmur heard. No friction rub. No gallop. Pulmonary:      Effort: Pulmonary effort is normal. No tachypnea, accessory muscle usage or respiratory distress. Breath sounds: Normal breath sounds. No decreased breath sounds, wheezing, rhonchi or rales. Musculoskeletal:      Comments: Left foot warm pink and dry and no lesion. Lymphadenopathy:      Cervical: No cervical adenopathy. Upper Body:      Right upper body: No supraclavicular adenopathy. Left upper body: No supraclavicular adenopathy. Skin:     General: Skin is warm and dry. Coloration: Skin is not pale. Neurological:      Mental Status: He is alert. Assessment:       Diagnosis Orders   1. Type 2 diabetes mellitus without complication, without long-term current use of insulin ([facility])  Comprehensive Metabolic Panel    Hemoglobin A1C    Lipid Panel    Vitamin B12 & Folate    TSH      2. Mixed hyperlipidemia  Comprehensive Metabolic Panel    Lipid Panel    TSH      3.  Tingling of both feet  Vitamin B12 & Folate    TSH    Nerve conduction test        Hemoglobin A1C   Date Value Ref Range Status   05/17/2022 7.2 See comment % Final     Comment:     Comment:  Diagnosis of Diabetes: > or = 6.5%  Increased risk of diabetes (Prediabetes): 5.7-6.4%  Glycemic Control: Nonpregnant Adults: <7.0%                    Pregnant: <6.0%            He stopped his meds for the dm !   Plan:      Continue the diet  Stay with medicine  Do the fasting blood   See in about 4 months         Nellie Lieberman MD

## 2023-05-11 ENCOUNTER — APPOINTMENT (OUTPATIENT)
Dept: CT IMAGING | Age: 38
End: 2023-05-11
Payer: COMMERCIAL

## 2023-05-11 ENCOUNTER — HOSPITAL ENCOUNTER (EMERGENCY)
Age: 38
Discharge: HOME | End: 2023-05-11
Attending: EMERGENCY MEDICINE
Payer: COMMERCIAL

## 2023-05-11 ENCOUNTER — APPOINTMENT (OUTPATIENT)
Dept: GENERAL RADIOLOGY | Age: 38
End: 2023-05-11
Payer: COMMERCIAL

## 2023-05-11 VITALS
HEART RATE: 69 BPM | RESPIRATION RATE: 21 BRPM | DIASTOLIC BLOOD PRESSURE: 85 MMHG | SYSTOLIC BLOOD PRESSURE: 137 MMHG | OXYGEN SATURATION: 96 % | TEMPERATURE: 98.7 F

## 2023-05-11 DIAGNOSIS — R07.9 CHEST PAIN, UNSPECIFIED TYPE: Primary | ICD-10-CM

## 2023-05-11 LAB
ALBUMIN SERPL-MCNC: 4.4 G/DL (ref 3.4–5)
ALBUMIN/GLOB SERPL: 1.2 {RATIO} (ref 1.1–2.2)
ALP SERPL-CCNC: 110 U/L (ref 40–129)
ALT SERPL-CCNC: 43 U/L (ref 10–40)
ANION GAP SERPL CALCULATED.3IONS-SCNC: 9 MMOL/L (ref 3–16)
AST SERPL-CCNC: 46 U/L (ref 15–37)
BASOPHILS # BLD: 0.1 K/UL (ref 0–0.2)
BASOPHILS NFR BLD: 0.6 %
BILIRUB SERPL-MCNC: 0.8 MG/DL (ref 0–1)
BUN SERPL-MCNC: 12 MG/DL (ref 7–20)
CALCIUM SERPL-MCNC: 9.9 MG/DL (ref 8.3–10.6)
CHLORIDE SERPL-SCNC: 95 MMOL/L (ref 99–110)
CO2 SERPL-SCNC: 28 MMOL/L (ref 21–32)
CREAT SERPL-MCNC: 0.8 MG/DL (ref 0.9–1.3)
DEPRECATED RDW RBC AUTO: 13.3 % (ref 12.4–15.4)
EKG ATRIAL RATE: 79 BPM
EKG DIAGNOSIS: NORMAL
EKG P AXIS: 29 DEGREES
EKG P-R INTERVAL: 148 MS
EKG Q-T INTERVAL: 408 MS
EKG QRS DURATION: 96 MS
EKG QTC CALCULATION (BAZETT): 467 MS
EKG R AXIS: 62 DEGREES
EKG T AXIS: 27 DEGREES
EKG VENTRICULAR RATE: 79 BPM
EOSINOPHIL # BLD: 0.2 K/UL (ref 0–0.6)
EOSINOPHIL NFR BLD: 1.1 %
GFR SERPLBLD CREATININE-BSD FMLA CKD-EPI: >60 ML/MIN/{1.73_M2}
GLUCOSE SERPL-MCNC: 133 MG/DL (ref 70–99)
HCT VFR BLD AUTO: 40.8 % (ref 40.5–52.5)
HGB BLD-MCNC: 14.3 G/DL (ref 13.5–17.5)
LYMPHOCYTES # BLD: 1.9 K/UL (ref 1–5.1)
LYMPHOCYTES NFR BLD: 14.5 %
MCH RBC QN AUTO: 32.5 PG (ref 26–34)
MCHC RBC AUTO-ENTMCNC: 35.1 G/DL (ref 31–36)
MCV RBC AUTO: 92.7 FL (ref 80–100)
MONOCYTES # BLD: 1.1 K/UL (ref 0–1.3)
MONOCYTES NFR BLD: 8.6 %
NEUTROPHILS # BLD: 10 K/UL (ref 1.7–7.7)
NEUTROPHILS NFR BLD: 75.2 %
PLATELET # BLD AUTO: 190 K/UL (ref 135–450)
PMV BLD AUTO: 8.4 FL (ref 5–10.5)
POTASSIUM SERPL-SCNC: 4.2 MMOL/L (ref 3.5–5.1)
PROT SERPL-MCNC: 8 G/DL (ref 6.4–8.2)
RBC # BLD AUTO: 4.4 M/UL (ref 4.2–5.9)
SODIUM SERPL-SCNC: 132 MMOL/L (ref 136–145)
TROPONIN, HIGH SENSITIVITY: <6 NG/L (ref 0–22)
TROPONIN, HIGH SENSITIVITY: <6 NG/L (ref 0–22)
WBC # BLD AUTO: 13.3 K/UL (ref 4–11)

## 2023-05-11 PROCEDURE — 71260 CT THORAX DX C+: CPT

## 2023-05-11 PROCEDURE — 85025 COMPLETE CBC W/AUTO DIFF WBC: CPT

## 2023-05-11 PROCEDURE — 93005 ELECTROCARDIOGRAM TRACING: CPT | Performed by: EMERGENCY MEDICINE

## 2023-05-11 PROCEDURE — 80053 COMPREHEN METABOLIC PANEL: CPT

## 2023-05-11 PROCEDURE — 36415 COLL VENOUS BLD VENIPUNCTURE: CPT

## 2023-05-11 PROCEDURE — 93010 ELECTROCARDIOGRAM REPORT: CPT | Performed by: INTERNAL MEDICINE

## 2023-05-11 PROCEDURE — 6360000004 HC RX CONTRAST MEDICATION: Performed by: EMERGENCY MEDICINE

## 2023-05-11 PROCEDURE — 99285 EMERGENCY DEPT VISIT HI MDM: CPT

## 2023-05-11 PROCEDURE — 71045 X-RAY EXAM CHEST 1 VIEW: CPT

## 2023-05-11 PROCEDURE — 84484 ASSAY OF TROPONIN QUANT: CPT

## 2023-05-11 RX ADMIN — IOPAMIDOL 75 ML: 755 INJECTION, SOLUTION INTRAVENOUS at 13:13

## 2023-05-11 NOTE — ED NOTES
IV catheter discontinued intact. Site without signs and symptoms of complications. Dressing and pressure applied. Patient discharge completed. Discharge information included information on diagnosis including signs and symptoms, complications and when to seek medical attention. Information on new medications also provided included use for the medication, side effects and when to call the doctor. Patient verbalized understanding of all discharge information. Patient escorted out by staff with all documented belongings. Back to [facility] via family.       Reuben Mcmillan RN  05/11/23 6218

## 2023-05-11 NOTE — DISCHARGE INSTRUCTIONS
Your work up appears reassuring. You have a nodule in your lung on CT that looks like old scarring. Follow-up with your primary doctor within the next week. If you feel you are worsening or have any recurrent symptoms he should return to the ER immediately.

## 2023-05-11 NOTE — ED NOTES
1004-12 Lead ECG completed and given to Dr. Otoniel Rowell for review.       Melanie Damico  05/11/23 1008

## 2023-05-11 NOTE — ED PROVIDER NOTES
History    Marital status:      Spouse name: Not on file    Number of children: Not on file    Years of education: Not on file    Highest education level: Not on file   Occupational History    Not on file   Tobacco Use    Smoking status: Never    Smokeless tobacco: Current     Types: Snuff    Tobacco comments:     dip   Vaping Use    Vaping Use: Never used   Substance and Sexual Activity    Alcohol use: Yes     Alcohol/week: 24.0 standard drinks     Types: 24 Cans of beer per week     Comment: per week    Drug use: No    Sexual activity: Yes     Partners: Male   Other Topics Concern    Not on file   Social History Narrative    Not on file     Social Determinants of Health     Financial Resource Strain: Low Risk     Difficulty of Paying Living Expenses: Not hard at all   Food Insecurity: No Food Insecurity    Worried About [employer] [email or web address] in the Last Year: Never true    [employer] in the Last Year: Never true   Transportation Needs: No Transportation Needs    Lack of Transportation (Medical): No    Lack of Transportation (Non-Medical): No   Physical Activity: Sufficiently Active    Days of Exercise per Week: 7 days    Minutes of Exercise per Session: 150+ min   Stress: Stress Concern Present    Feeling of Stress : To some extent   Social Connections:  Moderately Integrated    Frequency of Communication with Friends and Family: More than three times a week    Frequency of Social Gatherings with Friends and Family: Once a week    Attends Mu-[employer] Services: 1 to 4 times per year    Active Member of [employer] or Organizations: No    Attends Club or Organization Meetings: Never    Marital Status:    Intimate Partner Violence: Not At Risk    Fear of Current or Ex-Partner: No    Emotionally Abused: No    Physically Abused: No    Sexually Abused: No   Housing Stability: Low Risk     Unable to Pay for Housing in the Last Year: No    Number of [place] in the Last Year: 1    Unstable Housing in the Last

## 2023-07-25 ENCOUNTER — TELEPHONE (OUTPATIENT)
Dept: FAMILY MEDICINE CLINIC | Age: 38
End: 2023-07-25

## 2023-07-25 NOTE — TELEPHONE ENCOUNTER
Patient calling to see what his last A1C was? He said he had in drawn at [address] [place] [address].       Please call, [phone]

## 2024-07-16 ENCOUNTER — OFFICE VISIT (OUTPATIENT)
Dept: URGENT CARE | Age: 39
End: 2024-07-16

## 2024-07-16 VITALS
BODY MASS INDEX: 31.44 KG/M2 | SYSTOLIC BLOOD PRESSURE: 132 MMHG | HEART RATE: 84 BPM | DIASTOLIC BLOOD PRESSURE: 92 MMHG | OXYGEN SATURATION: 96 % | WEIGHT: 245 LBS | HEIGHT: 74 IN | TEMPERATURE: 98.3 F

## 2024-07-16 DIAGNOSIS — M17.11 ARTHRITIS OF KNEE, RIGHT: ICD-10-CM

## 2024-07-16 DIAGNOSIS — I10 UNCONTROLLED HYPERTENSION: ICD-10-CM

## 2024-07-16 DIAGNOSIS — M25.461 EFFUSION OF RIGHT KNEE: Primary | ICD-10-CM

## 2024-07-16 RX ORDER — NALTREXONE 380 MG
380 KIT INTRAMUSCULAR
COMMUNITY
Start: 2024-07-02

## 2024-07-16 RX ORDER — LANOLIN ALCOHOL/MO/W.PET/CERES
100 CREAM (GRAM) TOPICAL DAILY
COMMUNITY
Start: 2024-06-28

## 2024-07-16 RX ORDER — FOLIC ACID 1 MG/1
1 TABLET ORAL DAILY
COMMUNITY
Start: 2024-06-28

## 2024-07-16 RX ORDER — NALTREXONE HYDROCHLORIDE 50 MG/1
50 TABLET, FILM COATED ORAL DAILY
COMMUNITY
Start: 2024-07-09

## 2024-07-16 NOTE — PROGRESS NOTES
Jeremy Dorman (: 1985) is a 39 y.o. male, New patient, here for evaluation of the following chief complaint(s):  fluid on rt knee (Pt has had fluid drained from knee in past)      ASSESSMENT/PLAN:    ICD-10-CM    1. Effusion of right knee  M25.461 diclofenac sodium (VOLTAREN) 1 % GEL      2. Arthritis of knee, right  M17.11 diclofenac sodium (VOLTAREN) 1 % GEL      3. Uncontrolled hypertension  I10           - Right Knee Effusion:  Exam and history concerning for effusion of the right knee, likely secondary to arthritis of the knee.  Low concern for gout, septic arthritis, cellulitis, patellar fracture, patellar dislocation, burns, and abrasions.  Despite concerns for effusion, not able to perform draining in clinic given lack of appropriate equipment.  Topical Voltaren gel is prescribed for relief of the pain and swelling secondary to presumed arthritis of the right knee.  Recommended RICE therapy, including compression with ace wrap or knee sleeve to help with swelling and discomfort.  Recommended f/u with orthopedics for further evaluation of the knee, and to evaluate if tapping the knee is appropriate at this time.  Provided information regarding [facility] clinic to potentially obtain treatment today.    - Uncontrolled HTN:  Pt's BP was noted to be elevated during initial vital signs assessment - repeated BP assessment, >5 minutes after the initial measurement, shows persistent BP elevation.   Low concerns for hypertensive crisis or end organ damage at this time given pt's current symptoms and exam findings.  Discussed continued at-home monitoring of BP.  PCP follow up discussed with referral back to PCP provided for further evaluation regarding the pt's elevated BP readings noted in clinic.  Strict ED follow up instructions provided for any worsening HTN symptoms.    The patient tolerated their visit well. The patient and/or the family were informed of the results of any tests, a

## 2024-07-16 NOTE — PATIENT INSTRUCTIONS
Voltaren is prescribed for topical relief of the right knee pain.  Recommend Ace wrap or knee sleeve to help with compression.  Further recommend elevation and ice when swelling develops.     Recommend following up with the [facility] orthopedic clinic in [place] for further evaluation and possible aspiration/draining of any knee effusion.  [address] [place] [address]  Call [phone]    M-F: 5pm-9pm  Follow up with orthopedics for further evaluation and treatment if symptoms do not show evidence of relief after 1 week.    Continue at-home monitoring of BP - recommend keeping a log of your blood pressures to discuss with your PCP.   Follow up with PCP to further evaluate your elevated blood pressures noted in clinic - referral provided.  If headaches, vision changes, chest pain, shortness of breath, back pain, abdominal pain, weakness, numbness, dizziness, lightheadedness, or any other concerns develop, follow up with the ER for further evaluation.    New Prescriptions    DICLOFENAC SODIUM (VOLTAREN) 1 % GEL    Apply 4 g topically 4 times daily

## 2024-12-16 ENCOUNTER — APPOINTMENT (OUTPATIENT)
Dept: CT IMAGING | Age: 39
End: 2024-12-16
Payer: COMMERCIAL

## 2024-12-16 ENCOUNTER — HOSPITAL ENCOUNTER (EMERGENCY)
Age: 39
Discharge: HOME | End: 2024-12-16
Attending: EMERGENCY MEDICINE
Payer: COMMERCIAL

## 2024-12-16 VITALS
SYSTOLIC BLOOD PRESSURE: 102 MMHG | TEMPERATURE: 98.7 F | BODY MASS INDEX: 33.17 KG/M2 | DIASTOLIC BLOOD PRESSURE: 79 MMHG | RESPIRATION RATE: 20 BRPM | HEART RATE: 116 BPM | OXYGEN SATURATION: 96 % | WEIGHT: 258.38 LBS

## 2024-12-16 DIAGNOSIS — S09.90XA CLOSED HEAD INJURY, INITIAL ENCOUNTER: Primary | ICD-10-CM

## 2024-12-16 DIAGNOSIS — F10.920 ACUTE ALCOHOLIC INTOXICATION WITHOUT COMPLICATION (HCC): ICD-10-CM

## 2024-12-16 DIAGNOSIS — I48.0 PAROXYSMAL ATRIAL FIBRILLATION (HCC): ICD-10-CM
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
## 2024-12-16 DIAGNOSIS — S01.81XA LACERATION OF FOREHEAD, INITIAL ENCOUNTER: ICD-10-CM

## 2024-12-16 LAB
ALBUMIN SERPL-MCNC: 4.3 G/DL (ref 3.4–5)
ALBUMIN/GLOB SERPL: 1.2 {RATIO} (ref 1.1–2.2)
ALP SERPL-CCNC: 115 U/L (ref 40–129)
ALT SERPL-CCNC: 30 U/L (ref 10–40)
AMPHETAMINES UR QL SCN>1000 NG/ML: NORMAL
ANION GAP SERPL CALCULATED.3IONS-SCNC: 16 MMOL/L (ref 3–16)
AST SERPL-CCNC: 44 U/L (ref 15–37)
BARBITURATES UR QL SCN>200 NG/ML: NORMAL
BASOPHILS # BLD: 0 K/UL (ref 0–0.2)
BASOPHILS NFR BLD: 0.2 %
BENZODIAZ UR QL SCN>200 NG/ML: NORMAL
BILIRUB SERPL-MCNC: 0.5 MG/DL (ref 0–1)
BILIRUB UR QL STRIP.AUTO: NEGATIVE
BUN SERPL-MCNC: 10 MG/DL (ref 7–20)
CALCIUM SERPL-MCNC: 8.7 MG/DL (ref 8.3–10.6)
CANNABINOIDS UR QL SCN>50 NG/ML: NORMAL
CHLORIDE SERPL-SCNC: 96 MMOL/L (ref 99–110)
CLARITY UR: CLEAR
CO2 SERPL-SCNC: 20 MMOL/L (ref 21–32)
COCAINE UR QL SCN: NORMAL
COLOR UR: YELLOW
CREAT SERPL-MCNC: 0.7 MG/DL (ref 0.9–1.3)
DEPRECATED RDW RBC AUTO: 11.8 % (ref 12.4–15.4)
DRUG SCREEN COMMENT UR-IMP: NORMAL
EKG ATRIAL RATE: 375 BPM
EKG DIAGNOSIS: NORMAL
EKG Q-T INTERVAL: 362 MS
EKG QRS DURATION: 102 MS
EKG QTC CALCULATION (BAZETT): 471 MS
EKG R AXIS: 61 DEGREES
EKG T AXIS: -9 DEGREES
EKG VENTRICULAR RATE: 102 BPM
EOSINOPHIL # BLD: 0.2 K/UL (ref 0–0.6)
EOSINOPHIL NFR BLD: 1.3 %
ETHANOLAMINE SERPL-MCNC: 332 MG/DL (ref 0–0.08)
FENTANYL SCREEN, URINE: NORMAL
GFR SERPLBLD CREATININE-BSD FMLA CKD-EPI: >90 ML/MIN/{1.73_M2}
GLUCOSE BLD-MCNC: 142 MG/DL (ref 70–99)
GLUCOSE SERPL-MCNC: 154 MG/DL (ref 70–99)
GLUCOSE UR STRIP.AUTO-MCNC: NEGATIVE MG/DL
HCT VFR BLD AUTO: 44.4 % (ref 40.5–52.5)
HGB BLD-MCNC: 16 G/DL (ref 13.5–17.5)
HGB UR QL STRIP.AUTO: NEGATIVE
IMM GRANULOCYTES # BLD: 0.1 K/UL (ref 0–0.2)
IMM GRANULOCYTES NFR BLD: 0.5 %
INR PPP: 0.97 (ref 0.85–1.15)
KETONES UR STRIP.AUTO-MCNC: NEGATIVE MG/DL
LEUKOCYTE ESTERASE UR QL STRIP.AUTO: NEGATIVE
LYMPHOCYTES # BLD: 3 K/UL (ref 1–5.1)
LYMPHOCYTES NFR BLD: 26.3 %
MCH RBC QN AUTO: 31.3 PG (ref 26–34)
MCHC RBC AUTO-ENTMCNC: 36 G/DL (ref 32–36.4)
MCV RBC AUTO: 86.7 FL (ref 80–100)
METHADONE UR QL SCN>300 NG/ML: NORMAL
MONOCYTES # BLD: 0.7 K/UL (ref 0–1.3)
MONOCYTES NFR BLD: 6.1 %
NEUTROPHILS # BLD: 7.4 K/UL (ref 1.7–7.7)
NEUTROPHILS NFR BLD: 65.6 %
NITRITE UR QL STRIP.AUTO: NEGATIVE
OPIATES UR QL SCN>300 NG/ML: NORMAL
OXYCODONE UR QL SCN: NORMAL
PCP UR QL SCN>25 NG/ML: NORMAL
PERFORMED ON: ABNORMAL
PH UR STRIP.AUTO: 5.5 [PH] (ref 5–8)
PH UR STRIP: 5.5 [PH]
PLATELET # BLD AUTO: 229 K/UL (ref 135–450)
PMV BLD AUTO: 9.4 FL (ref 5–10.5)
POTASSIUM SERPL-SCNC: 3.8 MMOL/L (ref 3.5–5.1)
PROT SERPL-MCNC: 7.9 G/DL (ref 6.4–8.2)
PROT UR STRIP.AUTO-MCNC: NEGATIVE MG/DL
PROTHROMBIN TIME: 13.1 SEC (ref 11.9–14.9)
RBC # BLD AUTO: 5.12 M/UL (ref 4.2–5.9)
SODIUM SERPL-SCNC: 132 MMOL/L (ref 136–145)
SP GR UR STRIP.AUTO: <=1.005 (ref 1–1.03)
UA COMPLETE W REFLEX CULTURE PNL UR: NORMAL
UA DIPSTICK W REFLEX MICRO PNL UR: NORMAL
URN SPEC COLLECT METH UR: NORMAL
UROBILINOGEN UR STRIP-ACNC: 0.2 E.U./DL
WBC # BLD AUTO: 11.3 K/UL (ref 4–11)

## 2024-12-16 PROCEDURE — 90471 IMMUNIZATION ADMIN: CPT | Performed by: EMERGENCY MEDICINE

## 2024-12-16 PROCEDURE — 82947 ASSAY GLUCOSE BLOOD QUANT: CPT

## 2024-12-16 PROCEDURE — 82077 ASSAY SPEC XCP UR&BREATH IA: CPT

## 2024-12-16 PROCEDURE — 36415 COLL VENOUS BLD VENIPUNCTURE: CPT

## 2024-12-16 PROCEDURE — 90715 TDAP VACCINE 7 YRS/> IM: CPT | Performed by: EMERGENCY MEDICINE

## 2024-12-16 PROCEDURE — 93005 ELECTROCARDIOGRAM TRACING: CPT | Performed by: EMERGENCY MEDICINE

## 2024-12-16 PROCEDURE — 12013 RPR F/E/E/N/L/M 2.6-5.0 CM: CPT

## 2024-12-16 PROCEDURE — 70450 CT HEAD/BRAIN W/O DYE: CPT

## 2024-12-16 PROCEDURE — 72125 CT NECK SPINE W/O DYE: CPT

## 2024-12-16 PROCEDURE — 6360000004 HC RX CONTRAST MEDICATION: Performed by: EMERGENCY MEDICINE

## 2024-12-16 PROCEDURE — 81003 URINALYSIS AUTO W/O SCOPE: CPT

## 2024-12-16 PROCEDURE — 71260 CT THORAX DX C+: CPT

## 2024-12-16 PROCEDURE — 85610 PROTHROMBIN TIME: CPT

## 2024-12-16 PROCEDURE — 93010 ELECTROCARDIOGRAM REPORT: CPT | Performed by: INTERNAL MEDICINE

## 2024-12-16 PROCEDURE — 80307 DRUG TEST PRSMV CHEM ANLYZR: CPT

## 2024-12-16 PROCEDURE — 6360000002 HC RX W HCPCS: Performed by: EMERGENCY MEDICINE

## 2024-12-16 PROCEDURE — 85025 COMPLETE CBC W/AUTO DIFF WBC: CPT

## 2024-12-16 PROCEDURE — 80053 COMPREHEN METABOLIC PANEL: CPT

## 2024-12-16 PROCEDURE — 12052 INTMD RPR FACE/MM 2.6-5.0 CM: CPT

## 2024-12-16 PROCEDURE — 99285 EMERGENCY DEPT VISIT HI MDM: CPT

## 2024-12-16 RX ORDER — IOPAMIDOL 755 MG/ML
100 INJECTION, SOLUTION INTRAVASCULAR
Status: COMPLETED | OUTPATIENT
Start: 2024-12-16 | End: 2024-12-16

## 2024-12-16 RX ADMIN — IOPAMIDOL 100 ML: 755 INJECTION, SOLUTION INTRAVENOUS at 15:53

## 2024-12-16 RX ADMIN — TETANUS TOXOID, REDUCED DIPHTHERIA TOXOID AND ACELLULAR PERTUSSIS VACCINE, ADSORBED 0.5 ML: 5; 2.5; 8; 8; 2.5 SUSPENSION INTRAMUSCULAR at 17:21

## 2024-12-16 ASSESSMENT — PAIN SCALES - GENERAL: PAINLEVEL_OUTOF10: 3

## 2024-12-16 ASSESSMENT — PAIN DESCRIPTION - LOCATION: LOCATION: HEAD

## 2024-12-16 NOTE — ED PROVIDER NOTES
appropriate for him to be evaluated and treated by his cardiologist at [facility].  He again reiterated to me that he would not be staying in the hospital.  He states he is going to go home.  He has a friend who is going to take him home.  He states his wife gets off work at 7 PM and she will be there.  He assures me he will call his cardiologist in the morning.  I ask him to return if he reconsidered further evaluation and admission.  I asked him to return if he developed any new symptoms of concern including but not limited to vomiting, abdominal pain.  He was discharged with head injury instructions.  Tylenol only for pain.  Test results, diagnosis, and treatment plan were discussed in detail with the patient.      I am the Primary Clinician of Record.      PROCEDURES:  Lac Repair    Date/Time: 12/16/2024 4:43 PM    Performed by: Chris De Paz MD  Authorized by: Chris De Paz MD    Consent:     Consent obtained:  Verbal    Consent given by:  Patient    Risks, benefits, and alternatives were discussed: yes      Risks discussed:  Infection, need for additional repair, poor wound healing, poor cosmetic result, pain and retained foreign body  Universal protocol:     Procedure explained and questions answered to patient or proxy's satisfaction: yes      Patient identity confirmed:  Verbally with patient and arm band  Anesthesia:     Anesthesia method:  Local infiltration    Local anesthetic:  Lidocaine 1% w/o epi  Laceration details:     Location:  Face    Face location:  Forehead    Length (cm):  3  Pre-procedure details:     Preparation:  Patient was prepped and draped in usual sterile fashion and imaging obtained to evaluate for foreign bodies  Exploration:     Hemostasis achieved with:  Direct pressure    Imaging obtained: x-ray      Imaging outcome: foreign body not noted      Wound exploration: entire depth of wound visualized      Wound extent: muscle damage      Wound extent: no

## 2024-12-16 NOTE — ED NOTES
Provider order placed for patient's discharge. Provider reviewed decision to discharge with the patient. Discharge paperwork and any prescriptions were reviewed with the patient. Patient verbalized understanding of discharge education and any prescriptions and has no further questions or further needs at this time. Patient left with all personal belongings and was stable upon departure. Patient thanked for choosing [facility] and informed to return should any need arise.

## 2024-12-16 NOTE — ED TRIAGE NOTES
Patient presents to ED for c/o losing balance and fell down his basement steps, approx 12 steps. Patient states he was on the ground for approx 1.5 hours before prior to waking up and calling his wife. Patient noted with bandage to forehead, states pain to his head and tailbone, denies pain elsewhere. Family with patient states patient seems off. Patient noted with hx of AFIB, denies being on eliquis despite being on his med list. Patient A&O x4. Patient states he drank about 8 beers today, denies daily drinking.

## 2024-12-16 NOTE — ED NOTES
Bandage removed from patient's forehead, patient noted with large open laceration above left eye, able to see patient's skull bone.

## 2024-12-16 NOTE — DISCHARGE INSTRUCTIONS
As we discussed you are in atrial fibrillation.  This requires immediate follow-up with your cardiologist.  As we discussed you should call them first thing tomorrow morning.    You will need to follow-up in 5 to 7 days with your primary care physician for suture removal.    Because of your head injury you should avoid alcohol.  Someone should be with you for the next 24 to 48 hours to monitor you closely.    Return at anytime for any new symptoms of concern including but not limited to confusion, abdominal pain, vomiting.    You can take Tylenol every 6 hours as needed for pain.

## 2025-04-07 NOTE — PROGRESS NOTES
Jeremy Dorman (:  1985) is a 39 y.o. male,Established patient, here for evaluation of the following chief complaint(s):  Established New Doctor (Here to establish care )      Assessment & Plan  Type 2 diabetes mellitus without complication, without long-term current use of insulin  Last A1c in chart was 7.3 back in . Per pt, this was in the 6s recently but I do not see in chart. Will recheck with upcoming blood work         Major depressive disorder, single episode, severe (HCC)  Stable. Cont zoloft and trazodone. Monitor for problems         Alcohol use disorder, mild, abuse  Chronic. Still drinking but cut back as noted in HPI. Encourage cessation. Will check LFTs.         Open wound of left foot, sequela   Managed per wound care.          Neuropathy  Likely due to ETOH use. Will check blood work including Vit b12/folate levels when get results of most recent blood work done by podiatry.Will start gabapentin. Oarrs report reviewed. F/u in two weeks for f/u. Controlled Substance Monitoring:    Acute and Chronic Pain Monitoring:   RX Monitoring Periodic Controlled Substance Monitoring   2025   4:03 PM No signs of potential drug abuse or diversion identified.                  Return in about 2 weeks (around 2025) for F/u on gabapentin/review blood work.    Subjective       HPI    Pt is a 38 yo male here for f/u appt.    Diabetes mellitus-??? Diagnosis.  Patient does not check glucose levels in the AM.      Atrial Fhkzijicjfwn-JIQUM-JCIh score 1.  Current symptoms-Yes; occ palpitations. Patient does follow with cardiology; at [facility] but is overdue for appt.   Pt is not taking ASA.    Depression/insomnia-zoloft and trazodone are controlled symptoms.     Hx of ETOH use d/o.  12-18 beers per day for 17 yrs. Now drinking 6 beers per night 2 days per week for 2 yrs. No hx of Dts/seizures from ETOH withdrawal.     Left foot--open wound. Following with Dr Cardona. Pt has neuropathy. Told he may

## 2025-04-08 ENCOUNTER — OFFICE VISIT (OUTPATIENT)
Dept: FAMILY MEDICINE CLINIC | Age: 40
End: 2025-04-08
Payer: COMMERCIAL

## 2025-04-08 VITALS
SYSTOLIC BLOOD PRESSURE: 128 MMHG | DIASTOLIC BLOOD PRESSURE: 74 MMHG | BODY MASS INDEX: 33.32 KG/M2 | WEIGHT: 259.6 LBS | HEIGHT: 74 IN | TEMPERATURE: 98.6 F

## 2025-04-08 DIAGNOSIS — S91.302S OPEN WOUND OF LEFT FOOT, SEQUELA: ICD-10-CM

## 2025-04-08 DIAGNOSIS — E11.9 TYPE 2 DIABETES MELLITUS WITHOUT COMPLICATION, WITHOUT LONG-TERM CURRENT USE OF INSULIN: Primary | ICD-10-CM

## 2025-04-08 DIAGNOSIS — F32.2 MAJOR DEPRESSIVE DISORDER, SINGLE EPISODE, SEVERE (HCC): ICD-10-CM

## 2025-04-08 DIAGNOSIS — F10.10 ALCOHOL USE DISORDER, MILD, ABUSE: ICD-10-CM

## 2025-04-08 DIAGNOSIS — G62.9 NEUROPATHY: ICD-10-CM

## 2025-04-08 PROBLEM — L97.529 DIABETIC ULCER OF TOE OF LEFT FOOT (HCC): Status: RESOLVED | Noted: 2022-05-18 | Resolved: 2025-04-08

## 2025-04-08 PROBLEM — E11.621 DIABETIC ULCER OF TOE OF LEFT FOOT (HCC): Status: RESOLVED | Noted: 2022-05-18 | Resolved: 2025-04-08

## 2025-04-08 PROBLEM — R10.11 RIGHT UPPER QUADRANT ABDOMINAL PAIN: Status: RESOLVED | Noted: 2017-10-29 | Resolved: 2025-04-08

## 2025-04-08 PROBLEM — T50.902A SUICIDAL OVERDOSE, INITIAL ENCOUNTER (HCC): Status: RESOLVED | Noted: 2020-03-07 | Resolved: 2025-04-08

## 2025-04-08 PROBLEM — S91.302A OPEN WOUND OF LEFT FOOT: Status: ACTIVE | Noted: 2025-04-08

## 2025-04-08 PROBLEM — A41.9 SEPSIS (HCC): Status: RESOLVED | Noted: 2022-05-16 | Resolved: 2025-04-08

## 2025-04-08 PROBLEM — M71.162 SEPTIC PREPATELLAR BURSITIS OF LEFT KNEE: Status: RESOLVED | Noted: 2018-07-11 | Resolved: 2025-04-08

## 2025-04-08 PROCEDURE — 99214 OFFICE O/P EST MOD 30 MIN: CPT | Performed by: INTERNAL MEDICINE

## 2025-04-08 RX ORDER — LISINOPRIL 20 MG/1
20 TABLET ORAL DAILY
COMMUNITY

## 2025-04-08 RX ORDER — GABAPENTIN 300 MG/1
CAPSULE ORAL
Qty: 60 CAPSULE | Refills: 0 | Status: SHIPPED | OUTPATIENT
Start: 2025-04-08 | End: 2025-05-08

## 2025-04-08 SDOH — ECONOMIC STABILITY: FOOD INSECURITY: WITHIN THE PAST 12 MONTHS, YOU WORRIED THAT YOUR FOOD WOULD RUN OUT BEFORE YOU GOT MONEY TO BUY MORE.: NEVER TRUE

## 2025-04-08 SDOH — ECONOMIC STABILITY: FOOD INSECURITY: WITHIN THE PAST 12 MONTHS, THE FOOD YOU BOUGHT JUST DIDN'T LAST AND YOU DIDN'T HAVE MONEY TO GET MORE.: NEVER TRUE

## 2025-04-08 ASSESSMENT — PATIENT HEALTH QUESTIONNAIRE - PHQ9
2. FEELING DOWN, DEPRESSED OR HOPELESS: NOT AT ALL
5. POOR APPETITE OR OVEREATING: NOT AT ALL
10. IF YOU CHECKED OFF ANY PROBLEMS, HOW DIFFICULT HAVE THESE PROBLEMS MADE IT FOR YOU TO DO YOUR WORK, TAKE CARE OF THINGS AT HOME, OR GET ALONG WITH OTHER PEOPLE: NOT DIFFICULT AT ALL
SUM OF ALL RESPONSES TO PHQ QUESTIONS 1-9: 0
1. LITTLE INTEREST OR PLEASURE IN DOING THINGS: NOT AT ALL
SUM OF ALL RESPONSES TO PHQ QUESTIONS 1-9: 0
4. FEELING TIRED OR HAVING LITTLE ENERGY: NOT AT ALL
SUM OF ALL RESPONSES TO PHQ QUESTIONS 1-9: 0
8. MOVING OR SPEAKING SO SLOWLY THAT OTHER PEOPLE COULD HAVE NOTICED. OR THE OPPOSITE, BEING SO FIGETY OR RESTLESS THAT YOU HAVE BEEN MOVING AROUND A LOT MORE THAN USUAL: NOT AT ALL
9. THOUGHTS THAT YOU WOULD BE BETTER OFF DEAD, OR OF HURTING YOURSELF: NOT AT ALL
6. FEELING BAD ABOUT YOURSELF - OR THAT YOU ARE A FAILURE OR HAVE LET YOURSELF OR YOUR FAMILY DOWN: NOT AT ALL
7. TROUBLE CONCENTRATING ON THINGS, SUCH AS READING THE NEWSPAPER OR WATCHING TELEVISION: NOT AT ALL
3. TROUBLE FALLING OR STAYING ASLEEP: NOT AT ALL
SUM OF ALL RESPONSES TO PHQ QUESTIONS 1-9: 0

## 2025-04-08 ASSESSMENT — ENCOUNTER SYMPTOMS
BACK PAIN: 0
COUGH: 0
SHORTNESS OF BREATH: 0
VOMITING: 0
ABDOMINAL PAIN: 0
NAUSEA: 0
COLOR CHANGE: 0

## 2025-04-08 NOTE — PATIENT INSTRUCTIONS
Need to make appt with cards asap.   Would recommend taking asa 325 mg daily.  Bring in blood work results so we can order additional blood work.   Will start gabapentin. F/u in 2 weeks. Call sooner with problems

## 2025-04-08 NOTE — ASSESSMENT & PLAN NOTE
Chronic. Still drinking but cut back as noted in HPI. Encourage cessation. Will check LFTs.

## 2025-04-08 NOTE — ASSESSMENT & PLAN NOTE
Likely due to ETOH use. Will check blood work including Vit b12/folate levels when get results of most recent blood work done by podiatry.Will start gabapentin. Oarrs report reviewed. F/u in two weeks for f/u. Controlled Substance Monitoring:    Acute and Chronic Pain Monitoring:   RX Monitoring Periodic Controlled Substance Monitoring   4/8/2025   4:03 PM No signs of potential drug abuse or diversion identified.

## 2025-04-08 NOTE — ASSESSMENT & PLAN NOTE
Last A1c in chart was 7.3 back in 2022. Per pt, this was in the 6s recently but I do not see in chart. Will recheck with upcoming blood work

## 2025-04-30 ENCOUNTER — TELEPHONE (OUTPATIENT)
Dept: FAMILY MEDICINE CLINIC | Age: 40
End: 2025-04-30

## 2025-04-30 NOTE — TELEPHONE ENCOUNTER
SW patient regarding this information. He states he thinks the last A1C was with [facility] and has not had this done for quite sometime. I did try and see if [facility] had any A1C labs, but was unable to find any outside [facility]. I did inform him that if he is concerned he can ask to have an updated A1C by Dr. Khan either at the office, or at the lab.   He states he has DM neuropathy in both his feet and does not understand why. He, to his knowledge ,was never given a definitive DX of DM. He was DX with [place] that he has in his bilateral feet by a Podiatrist. To his knowledge is what diabetic patients can get if their BS have been out of control for sometime. Patient is not on any DM medications and does not check his BS. He will discuss this with Dr. Khan further at his next OV on 5-5-2025.

## 2025-04-30 NOTE — TELEPHONE ENCOUNTER
Pt was seen on 04/08/2025, he states that someone would call him for an appointment and let him know when labs were ordered ? He made a follow up appointment per return instructions for 05/05, he states that a full panel needs to be order for labs. I didn't see any orders in computer , please advise pt.

## 2025-05-08 RX ORDER — GABAPENTIN 300 MG/1
CAPSULE ORAL
Qty: 60 CAPSULE | Refills: 0 | Status: SHIPPED | OUTPATIENT
Start: 2025-05-08 | End: 2025-06-08

## 2025-05-08 NOTE — TELEPHONE ENCOUNTER
Missed last appt. Need to make f/u appt.  I will send 30 day supply to pharmacy.  Controlled Substance Monitoring:    Acute and Chronic Pain Monitoring:   RX Monitoring Periodic Controlled Substance Monitoring   4/8/2025   4:03 PM No signs of potential drug abuse or diversion identified.

## 2025-06-04 ENCOUNTER — TELEPHONE (OUTPATIENT)
Dept: FAMILY MEDICINE CLINIC | Age: 40
End: 2025-06-04

## 2025-06-04 DIAGNOSIS — M05.9 RHEUMATOID ARTHRITIS WITH RHEUMATOID FACTOR, UNSPECIFIED (HCC): Primary | ICD-10-CM

## 2025-06-04 NOTE — TELEPHONE ENCOUNTER
----- Message from [employer] MAYTE sent at 6/4/2025 12:48 PM EDT -----  Regarding: [facility] Referral Request  [facility] Referral Request    Reason for referral request: Specialty Provider    Specialist/Lab/Test patient is requesting (if known):Rheumatologist     Specialist Phone Number (if applicable):    Additional Information Patient is requesting to get order for rheumatologist   --------------------------------------------------------------------------------------------------------------------------    Relationship to Patient: Self     Call Back Information: OK to leave message on voicemail  Preferred Call Back Number: Phone

## 2025-06-27 NOTE — PLAN OF CARE
Problem: Discharge Planning  Goal: Discharge to home or other facility with appropriate resources  Outcome: Progressing     Problem: Safety - Adult  Goal: Free from fall injury  Outcome: Progressing No

## 2025-07-10 ENCOUNTER — TELEPHONE (OUTPATIENT)
Dept: FAMILY MEDICINE CLINIC | Age: 40
End: 2025-07-10

## 2025-07-10 DIAGNOSIS — E78.2 MIXED HYPERLIPIDEMIA: Primary | ICD-10-CM

## 2025-07-10 DIAGNOSIS — G62.9 NEUROPATHY: ICD-10-CM

## 2025-07-10 DIAGNOSIS — E11.9 TYPE 2 DIABETES MELLITUS WITHOUT COMPLICATION, WITHOUT LONG-TERM CURRENT USE OF INSULIN (HCC): ICD-10-CM

## 2025-07-10 DIAGNOSIS — F32.2 MAJOR DEPRESSIVE DISORDER, SINGLE EPISODE, SEVERE (HCC): ICD-10-CM

## 2025-07-10 NOTE — TELEPHONE ENCOUNTER
Patient is asking for orders to have labs done, he wants a full work up.    Please call [phone]    LOV 4/8/25

## 2025-08-19 ENCOUNTER — OFFICE VISIT (OUTPATIENT)
Dept: FAMILY MEDICINE CLINIC | Age: 40
End: 2025-08-19
Payer: COMMERCIAL

## 2025-08-19 ENCOUNTER — TELEPHONE (OUTPATIENT)
Dept: CARDIOLOGY CLINIC | Age: 40
End: 2025-08-19

## 2025-08-19 VITALS
DIASTOLIC BLOOD PRESSURE: 98 MMHG | HEART RATE: 82 BPM | TEMPERATURE: 98.6 F | OXYGEN SATURATION: 98 % | SYSTOLIC BLOOD PRESSURE: 160 MMHG | BODY MASS INDEX: 31.9 KG/M2 | WEIGHT: 248.6 LBS | HEIGHT: 74 IN

## 2025-08-19 DIAGNOSIS — I48.20 CHRONIC ATRIAL FIBRILLATION (HCC): ICD-10-CM

## 2025-08-19 DIAGNOSIS — G62.9 NEUROPATHY: ICD-10-CM

## 2025-08-19 DIAGNOSIS — I10 PRIMARY HYPERTENSION: Primary | ICD-10-CM

## 2025-08-19 DIAGNOSIS — L89.890 PRESSURE INJURY OF LEFT FOOT, UNSTAGEABLE (HCC): ICD-10-CM

## 2025-08-19 DIAGNOSIS — E78.2 MIXED HYPERLIPIDEMIA: ICD-10-CM

## 2025-08-19 DIAGNOSIS — E11.9 TYPE 2 DIABETES MELLITUS WITHOUT COMPLICATION, WITHOUT LONG-TERM CURRENT USE OF INSULIN (HCC): ICD-10-CM

## 2025-08-19 DIAGNOSIS — M14.679 CHARCOT JOINT OF FOOT, UNSPECIFIED LATERALITY: ICD-10-CM

## 2025-08-19 PROCEDURE — 3077F SYST BP >= 140 MM HG: CPT | Performed by: NURSE PRACTITIONER

## 2025-08-19 PROCEDURE — 99214 OFFICE O/P EST MOD 30 MIN: CPT | Performed by: NURSE PRACTITIONER

## 2025-08-19 PROCEDURE — 3080F DIAST BP >= 90 MM HG: CPT | Performed by: NURSE PRACTITIONER

## 2025-08-19 RX ORDER — LISINOPRIL 20 MG/1
20 TABLET ORAL DAILY
Qty: 90 TABLET | Refills: 1 | Status: SHIPPED | OUTPATIENT
Start: 2025-08-19

## 2025-08-19 RX ORDER — GABAPENTIN 300 MG/1
CAPSULE ORAL
Qty: 60 CAPSULE | Refills: 0 | Status: SHIPPED | OUTPATIENT
Start: 2025-08-19 | End: 2025-11-30

## 2025-08-19 ASSESSMENT — ENCOUNTER SYMPTOMS
GASTROINTESTINAL NEGATIVE: 1
COUGH: 0
SHORTNESS OF BREATH: 0

## 2025-08-20 LAB
ALBUMIN SERPL-MCNC: 4.3 G/DL (ref 3.4–5)
ALBUMIN/GLOB SERPL: 1.3 {RATIO} (ref 1.1–2.2)
ALP SERPL-CCNC: 125 U/L (ref 40–129)
ALT SERPL-CCNC: 23 U/L (ref 10–40)
ANION GAP SERPL CALCULATED.3IONS-SCNC: 11 MMOL/L (ref 3–16)
AST SERPL-CCNC: 16 U/L (ref 15–37)
BASOPHILS # BLD: 0.1 K/UL (ref 0–0.2)
BASOPHILS NFR BLD: 0.9 %
BILIRUB SERPL-MCNC: 0.6 MG/DL (ref 0–1)
BUN SERPL-MCNC: 9 MG/DL (ref 7–20)
CALCIUM SERPL-MCNC: 9.6 MG/DL (ref 8.3–10.6)
CHLORIDE SERPL-SCNC: 100 MMOL/L (ref 99–110)
CHOLEST SERPL-MCNC: 206 MG/DL (ref 0–199)
CO2 SERPL-SCNC: 27 MMOL/L (ref 21–32)
CREAT SERPL-MCNC: 0.9 MG/DL (ref 0.9–1.3)
CREAT UR-MCNC: 99.8 MG/DL (ref 39–259)
DEPRECATED RDW RBC AUTO: 13.5 % (ref 12.4–15.4)
EOSINOPHIL # BLD: 0.3 K/UL (ref 0–0.6)
EOSINOPHIL NFR BLD: 2.5 %
EST. AVERAGE GLUCOSE BLD GHB EST-MCNC: 134.1 MG/DL
FOLATE SERPL-MCNC: 5.99 NG/ML (ref 4.78–24.2)
GFR SERPLBLD CREATININE-BSD FMLA CKD-EPI: >90 ML/MIN/{1.73_M2}
GLUCOSE SERPL-MCNC: 129 MG/DL (ref 70–99)
HBA1C MFR BLD: 6.3 %
HCT VFR BLD AUTO: 44.8 % (ref 40.5–52.5)
HDLC SERPL-MCNC: 46 MG/DL (ref 40–60)
HGB BLD-MCNC: 15.4 G/DL (ref 13.5–17.5)
LDLC SERPL CALC-MCNC: 141 MG/DL
LYMPHOCYTES # BLD: 2.7 K/UL (ref 1–5.1)
LYMPHOCYTES NFR BLD: 21.3 %
MCH RBC QN AUTO: 28.9 PG (ref 26–34)
MCHC RBC AUTO-ENTMCNC: 34.4 G/DL (ref 31–36)
MCV RBC AUTO: 84 FL (ref 80–100)
MICROALBUMIN UR DL<=1MG/L-MCNC: <1.2 MG/DL
MICROALBUMIN/CREAT UR: NORMAL MG/G (ref 0–30)
MONOCYTES # BLD: 0.7 K/UL (ref 0–1.3)
MONOCYTES NFR BLD: 5.5 %
NEUTROPHILS # BLD: 8.8 K/UL (ref 1.7–7.7)
NEUTROPHILS NFR BLD: 69.8 %
PLATELET # BLD AUTO: 336 K/UL (ref 135–450)
PMV BLD AUTO: 9.1 FL (ref 5–10.5)
POTASSIUM SERPL-SCNC: 4.5 MMOL/L (ref 3.5–5.1)
PROT SERPL-MCNC: 7.5 G/DL (ref 6.4–8.2)
RBC # BLD AUTO: 5.33 M/UL (ref 4.2–5.9)
SODIUM SERPL-SCNC: 138 MMOL/L (ref 136–145)
TRIGL SERPL-MCNC: 97 MG/DL (ref 0–150)
VIT B12 SERPL-MCNC: 785 PG/ML (ref 211–911)
VLDLC SERPL CALC-MCNC: 19 MG/DL
WBC # BLD AUTO: 12.6 K/UL (ref 4–11)

## (undated) DEVICE — SUTURE VCRL SZ 3-0 L27IN ABSRB UD L19MM PS-2 3/8 CIR PRIM J427H

## (undated) DEVICE — SOLUTION IV 1000ML 0.9% SOD CHL

## (undated) DEVICE — SPLINT 1524075 DOYLE BIVALVE SET C-FLEX: Brand: C-FLEX®

## (undated) DEVICE — SPLINT NSL TRAPEZOIDAL IVRY

## (undated) DEVICE — SKIN AFFIX SURG ADHESIVE 72/CS 0.55ML: Brand: MEDLINE

## (undated) DEVICE — CUFF REPROCESS BP TOURN SING BLDR 2 PORT 4X18IN

## (undated) DEVICE — SUTURE NONABSORBABLE MONOFILAMENT 5-0 PS-2 18 IN BLK ETHILON 1666H

## (undated) DEVICE — SUTURE MCRYL SZ 5-0 L18IN ABSRB UD L13MM P-3 3/8 CIR PRIM Y493G

## (undated) DEVICE — GLOVE ORANGE PI 7 1/2   MSG9075

## (undated) DEVICE — TOWEL,STOP FLAG GOLD N-W: Brand: MEDLINE

## (undated) DEVICE — BANDAGE ELASTIC 4 IN

## (undated) DEVICE — SYRINGE IRRIG 60ML SFT PLIABLE BLB EZ TO GRP 1 HND USE W/

## (undated) DEVICE — SUTURE NONABSORBABLE 18IN SZ 5-0 FS-2 19MM BLU REV CUT 3/8 8661G

## (undated) DEVICE — BANDAGE,GAUZE,CONFORMING,4"X75",STRL,LF: Brand: MEDLINE

## (undated) DEVICE — GLOVE ORANGE PI 8   MSG9080

## (undated) DEVICE — SYRINGE MED 3ML CLR PLAS STD N CTRL LUERLOCK TIP DISP

## (undated) DEVICE — ELECTRODE PT RET AD L9FT HI MOIST COND ADH HYDRGEL CORDED

## (undated) DEVICE — CODMAN® SURGICAL PATTIES 1/2" X 3" (1.27CM X 7.62CM): Brand: CODMAN®

## (undated) DEVICE — ENT I-LF: Brand: MEDLINE INDUSTRIES, INC.

## (undated) DEVICE — TUBING, SUCTION, 1/4" X 12', STRAIGHT: Brand: MEDLINE

## (undated) DEVICE — Z DISCONTINUED USE 2218611 SUTURE PROL PS-1 PRECIS PT RVS CUT 3/8 CIR 24MM 18 IN SZ 3-0

## (undated) DEVICE — GOWN SIRUS NONREIN XL W/TWL: Brand: MEDLINE INDUSTRIES, INC.

## (undated) DEVICE — STRIP,CLOSURE,WOUND,MEDI-STRIP,1/2X4: Brand: MEDLINE

## (undated) DEVICE — SUTURE NONABSORBABLE MONOFILAMENT 4-0 PS-2 18 IN BLK ETHILON 1667G

## (undated) DEVICE — MERCY HEALTH WEST TURNOVER: Brand: MEDLINE INDUSTRIES, INC.

## (undated) DEVICE — BNDG,ELSTC,MATRIX,STRL,4"X5YD,LF,HOOK&LP: Brand: MEDLINE

## (undated) DEVICE — TOWEL,OR,DSP,ST,BLUE,STD,4/PK,20PK/CS: Brand: MEDLINE

## (undated) DEVICE — GLOVE ORTHO 7 1/2   MSG9475

## (undated) DEVICE — TOWEL,OR,DSP,ST,BLUE,DLX,8/PK,10PK/CS: Brand: MEDLINE

## (undated) DEVICE — COVER,TABLE,44X90,STERILE: Brand: MEDLINE

## (undated) DEVICE — PODIATRY: Brand: MEDLINE INDUSTRIES, INC.

## (undated) DEVICE — COVER LT HNDL BLU PLAS

## (undated) DEVICE — SUTURE COAT VCRL SZ 4-0 L18IN ABSRB UD L19MM PS-2 1/2 CIR J496G

## (undated) DEVICE — SYRINGE MED 10ML LUERLOCK TIP W/O SFTY DISP